# Patient Record
Sex: FEMALE | Race: WHITE | NOT HISPANIC OR LATINO | Employment: FULL TIME | URBAN - METROPOLITAN AREA
[De-identification: names, ages, dates, MRNs, and addresses within clinical notes are randomized per-mention and may not be internally consistent; named-entity substitution may affect disease eponyms.]

---

## 2023-06-30 LAB — URINE CULTURE: NO GROWTH

## 2023-08-25 PROBLEM — C44.320 SQUAMOUS CELL CARCINOMA OF SKIN OF UNSPECIFIED PARTS OF FACE: Status: ACTIVE | Noted: 2020-10-14

## 2023-08-25 PROBLEM — C68.9 UROTHELIAL CARCINOMA (MULTI): Status: ACTIVE | Noted: 2023-08-25

## 2023-08-25 PROBLEM — E78.2 MIXED HYPERLIPIDEMIA: Status: ACTIVE | Noted: 2023-08-25

## 2023-08-25 PROBLEM — E11.9 TYPE 2 DIABETES MELLITUS WITHOUT COMPLICATION, WITHOUT LONG-TERM CURRENT USE OF INSULIN (MULTI): Status: ACTIVE | Noted: 2023-08-25

## 2023-08-25 PROBLEM — I10 ESSENTIAL (PRIMARY) HYPERTENSION: Status: ACTIVE | Noted: 2023-08-25

## 2023-08-25 PROBLEM — R09.89 BRUIT OF LEFT CAROTID ARTERY: Status: ACTIVE | Noted: 2023-08-25

## 2023-08-25 PROBLEM — R01.1 HEART MURMUR: Status: ACTIVE | Noted: 2023-08-25

## 2023-08-25 PROBLEM — N95.0 POSTMENOPAUSAL BLEEDING: Status: ACTIVE | Noted: 2023-08-25

## 2023-08-25 PROBLEM — R63.1 POLYDIPSIA: Status: ACTIVE | Noted: 2023-08-25

## 2023-08-25 PROBLEM — E66.9 OBESITY WITH BODY MASS INDEX 30 OR GREATER: Status: ACTIVE | Noted: 2023-08-25

## 2023-08-25 PROBLEM — B35.1 ONYCHOMYCOSIS: Status: ACTIVE | Noted: 2023-08-25

## 2023-08-25 PROBLEM — E55.9 VITAMIN D DEFICIENCY: Status: ACTIVE | Noted: 2023-08-25

## 2023-08-25 PROBLEM — J44.9 CHRONIC OBSTRUCTIVE LUNG DISEASE (MULTI): Status: ACTIVE | Noted: 2023-08-25

## 2023-08-25 PROBLEM — F17.200 NICOTINE DEPENDENCE: Status: ACTIVE | Noted: 2023-08-25

## 2023-08-25 PROBLEM — N95.2 ATROPHY OF VAGINA: Status: ACTIVE | Noted: 2023-08-25

## 2023-08-25 PROBLEM — C67.9 MALIGNANT NEOPLASM OF URINARY BLADDER (MULTI): Status: ACTIVE | Noted: 2023-08-25

## 2023-08-25 RX ORDER — LEVALBUTEROL TARTRATE 45 UG/1
AEROSOL, METERED ORAL
COMMUNITY
Start: 2021-12-04 | End: 2024-05-02 | Stop reason: ALTCHOICE

## 2023-08-25 RX ORDER — IPRATROPIUM BROMIDE AND ALBUTEROL SULFATE 2.5; .5 MG/3ML; MG/3ML
3 SOLUTION RESPIRATORY (INHALATION)
COMMUNITY
Start: 2016-01-30 | End: 2024-05-02 | Stop reason: ALTCHOICE

## 2023-08-25 RX ORDER — DULAGLUTIDE 1.5 MG/.5ML
1.5 INJECTION, SOLUTION SUBCUTANEOUS
COMMUNITY
Start: 2023-05-17 | End: 2023-10-25 | Stop reason: DRUGHIGH

## 2023-08-25 RX ORDER — LISINOPRIL 5 MG/1
1 TABLET ORAL DAILY
COMMUNITY
End: 2024-05-02 | Stop reason: SDUPTHER

## 2023-08-25 RX ORDER — METFORMIN HYDROCHLORIDE 500 MG/1
1 TABLET, EXTENDED RELEASE ORAL 2 TIMES DAILY
COMMUNITY
End: 2023-10-25 | Stop reason: SDUPTHER

## 2023-08-25 RX ORDER — ALBUTEROL SULFATE 0.83 MG/ML
2.5 SOLUTION RESPIRATORY (INHALATION) 2 TIMES DAILY
COMMUNITY
End: 2024-01-30 | Stop reason: WASHOUT

## 2023-08-25 RX ORDER — FLUTICASONE PROPIONATE 50 MCG
1 SPRAY, SUSPENSION (ML) NASAL DAILY
COMMUNITY
Start: 2017-05-20 | End: 2023-10-25

## 2023-08-25 RX ORDER — ALBUTEROL SULFATE 90 UG/1
1 AEROSOL, METERED RESPIRATORY (INHALATION) EVERY 4 HOURS PRN
COMMUNITY
End: 2024-01-30 | Stop reason: WASHOUT

## 2023-08-25 RX ORDER — FLUTICASONE PROPIONATE AND SALMETEROL 500; 50 UG/1; UG/1
1 POWDER RESPIRATORY (INHALATION)
COMMUNITY
End: 2023-10-25

## 2023-08-25 RX ORDER — ROSUVASTATIN CALCIUM 5 MG/1
5 TABLET, COATED ORAL DAILY
COMMUNITY
End: 2024-05-02 | Stop reason: SDUPTHER

## 2023-08-29 PROBLEM — R31.0 GROSS HEMATURIA: Status: ACTIVE | Noted: 2023-08-29

## 2023-08-29 PROBLEM — N95.8 GENITOURINARY SYNDROME OF MENOPAUSE: Status: ACTIVE | Noted: 2023-08-29

## 2023-08-29 PROBLEM — M67.921 TENDINOPATHY OF RIGHT BICEPS TENDON: Status: ACTIVE | Noted: 2023-08-29

## 2023-08-29 PROBLEM — S46.011A TRAUMATIC COMPLETE TEAR OF RIGHT ROTATOR CUFF: Status: ACTIVE | Noted: 2023-08-29

## 2023-08-29 RX ORDER — SEMAGLUTIDE 1.34 MG/ML
INJECTION, SOLUTION SUBCUTANEOUS
COMMUNITY
End: 2023-10-25 | Stop reason: ALTCHOICE

## 2023-08-29 RX ORDER — CEPHALEXIN 250 MG/1
1 CAPSULE ORAL NIGHTLY
COMMUNITY
End: 2024-05-02 | Stop reason: ALTCHOICE

## 2023-08-29 RX ORDER — CLINDAMYCIN PHOSPHATE 10 UG/ML
LOTION TOPICAL
COMMUNITY

## 2023-08-29 RX ORDER — GUAIFENESIN 100 MG/5ML
5 SOLUTION ORAL 4 TIMES DAILY PRN
COMMUNITY
End: 2023-10-25

## 2023-08-29 RX ORDER — ESTRADIOL 0.1 MG/G
CREAM VAGINAL NIGHTLY
COMMUNITY
End: 2023-10-25 | Stop reason: ALTCHOICE

## 2023-09-22 LAB
ANION GAP IN SER/PLAS: 16 MMOL/L (ref 10–20)
CALCIUM (MG/DL) IN SER/PLAS: 10.1 MG/DL (ref 8.6–10.6)
CARBON DIOXIDE, TOTAL (MMOL/L) IN SER/PLAS: 27 MMOL/L (ref 21–32)
CHLORIDE (MMOL/L) IN SER/PLAS: 103 MMOL/L (ref 98–107)
CREATININE (MG/DL) IN SER/PLAS: 0.63 MG/DL (ref 0.5–1.05)
GFR FEMALE: >90 ML/MIN/1.73M2
GLUCOSE (MG/DL) IN SER/PLAS: 133 MG/DL (ref 74–99)
POTASSIUM (MMOL/L) IN SER/PLAS: 4.5 MMOL/L (ref 3.5–5.3)
SODIUM (MMOL/L) IN SER/PLAS: 141 MMOL/L (ref 136–145)
UREA NITROGEN (MG/DL) IN SER/PLAS: 10 MG/DL (ref 6–23)

## 2023-10-02 ENCOUNTER — DOCUMENTATION (OUTPATIENT)
Dept: UROLOGY | Facility: CLINIC | Age: 63
End: 2023-10-02
Payer: COMMERCIAL

## 2023-10-05 ENCOUNTER — PROCEDURE VISIT (OUTPATIENT)
Dept: UROLOGY | Facility: CLINIC | Age: 63
End: 2023-10-05
Payer: COMMERCIAL

## 2023-10-05 VITALS — SYSTOLIC BLOOD PRESSURE: 119 MMHG | HEART RATE: 94 BPM | TEMPERATURE: 97.8 F | DIASTOLIC BLOOD PRESSURE: 74 MMHG

## 2023-10-05 DIAGNOSIS — C67.9 MALIGNANT NEOPLASM OF URINARY BLADDER, UNSPECIFIED SITE (MULTI): Primary | ICD-10-CM

## 2023-10-05 LAB
POC APPEARANCE, URINE: CLEAR
POC BILIRUBIN, URINE: NEGATIVE
POC BLOOD, URINE: NEGATIVE
POC COLOR, URINE: YELLOW
POC GLUCOSE, URINE: NEGATIVE MG/DL
POC KETONES, URINE: NEGATIVE MG/DL
POC LEUKOCYTES, URINE: NEGATIVE
POC NITRITE,URINE: NEGATIVE
POC PH, URINE: 5 PH
POC PROTEIN, URINE: NEGATIVE MG/DL
POC SPECIFIC GRAVITY, URINE: 1.02
POC UROBILINOGEN, URINE: 0.2 EU/DL

## 2023-10-05 PROCEDURE — 99213 OFFICE O/P EST LOW 20 MIN: CPT | Performed by: STUDENT IN AN ORGANIZED HEALTH CARE EDUCATION/TRAINING PROGRAM

## 2023-10-05 PROCEDURE — 52000 CYSTOURETHROSCOPY: CPT | Performed by: STUDENT IN AN ORGANIZED HEALTH CARE EDUCATION/TRAINING PROGRAM

## 2023-10-05 PROCEDURE — 87086 URINE CULTURE/COLONY COUNT: CPT | Performed by: STUDENT IN AN ORGANIZED HEALTH CARE EDUCATION/TRAINING PROGRAM

## 2023-10-05 PROCEDURE — 81002 URINALYSIS NONAUTO W/O SCOPE: CPT | Performed by: STUDENT IN AN ORGANIZED HEALTH CARE EDUCATION/TRAINING PROGRAM

## 2023-10-05 NOTE — PROGRESS NOTES
HPI:  Proc (3/8/22): TURBT large, R URS, stent placement, retrograde pyelogram  Path: Papillary urothelial carcinoma, low grade with focal high grade cytologic features, focal area suspicious for lamina propria invasion (TURBT), non-invasive papillary urothelial carcinoma, low grade (left bladder wall biopsy), partially-denuded urothelial mucosa with inflammation and focal atypia that does not meet the threshold for carcinoma in situ (deep bladder biopsy)     Proc (4/5/22): TURBT medium, stent exchange  Path: Partially denuded urothelial mucosa with chronic inflammation and proliferative changes, favor reactive, changes consistent with previous procedure, muscularis propria negative for carcinoma (TURBT), smooth muscle and adipose tissue negative for carcinoma (deep margin tumor biopsy)     Proc (9/6/22): TURBT small  Path: focal high-grade and predominantly low-grade papillary urothelial carcinoma, no definitive lamina propria invasion, muscularis propria uninvolved by carcinoma     62 year old female with hx of DM on metformin referred by Dr. Mcdonald with gross hematuria since September 2021. Went to OB/GYN who performed an US and endometrial biopsy which was XANDER. Had a UTI treated. Continued to have intermittent hematuria. Had a cystoscopy with Dr. Mcdonald (2/23/22) which showed a large tumor on the R lateral wall, and a small tumor on the L trigone. CT Urogram (2/28/22) with two synchronous urinary bladder masses, larger mass involves/surrounds right UVJ without obstructing it, smaller mass near/involving portion of left UVJ without obstructing it, a few nonspecific <6 mm solid indeterminate bibasilar lung nodules. S/p TURBT large (3/8/22) with pathology showing papillary urothelial carcinoma, low grade with focal high grade cytologic features, focal area suspicious for lamina propria invasion. Also s/p TURBT medium (4/5/22) with pathology showing partially denuded urothelial mucosa with chronic inflammation and  proliferative changes consistent with previous procedure, muscularis propria, smooth muscle and adipose tissue negative for carcinoma. Completed BCG. S/p TURBT small (9/6/22) with pathology showing focal high-grade and predominantly low-grade papillary urothelial carcinoma, no definitive lamina propria invasion, muscularis propria uninvolved by carcinoma. CT Urogram (9/29/23) showed no enhancing mass or filling defect in the bilateral ureters or bladder to suggest residual/recurrent disease and no evidence of distant disease. CT chest (9/29/23) showed few indeterminate less than 6 mm pulmonary nodules, otherwise, no suspicious pulmonary nodule or thoracic adenopathy. Presents for cystoscopy. Doing well.      Smoker x 43 years. Has quit smoking tobacco in February 22'. Continues to smoke marijuana   SHx: Mohs, rotator cuff, 2 C-sections.   FHx: Grandmother with bladder cancer (age 90). Did not die of disease.     Cre: 0.83 (2/23/22)     CT Urogram (2/28/22): Two synchronous urinary bladder masses. The larger mass involves/surrounds the right UVJ without obstructing it. The smaller mass is near/involving a portion of the left UVJ without obstructing it. A few nonspecific <6 mm solid indeterminate bibasilar lung nodules.     CT Urogram (9/29/23): no enhancing mass or filling defect in the bilateral ureters or bladder to suggest residual/recurrent disease and no evidence of distant disease.    CT chest (9/29/23): few indeterminate less than 6 mm pulmonary nodules, otherwise, no suspicious pulmonary nodule or thoracic adenopathy.    Review of Systems:  All systems reviewed. Anything negative noted in the HPI.    Physical Exam:  Vitals signs reviewed.  Constitutional:      Appearance: Well-developed.  HENT:     Head: Normocephalic and atraumatic.  Neck:     Musculoskeletal: Normal range of motion.  Pulmonary:     Effort: Pulmonary effort is normal.  Musculoskeletal: Normal range of motion.  Skin:     General: Skin is warm  and dry.  Neurological:     Mental Status: Alert and oriented to person, place, and time.  Psychiatric:        Behavior: Behavior normal.        Thought Content: Thought content normal.        Judgment: Judgment normal.    Cystoscopy    Date/Time: 10/5/2023 8:15 AM    Performed by: Fazal Mendoza MD  Authorized by: Fazal Mendoza MD      Post-procedure:     Patient tolerance: Patient tolerated the procedure well with no immediate complications      Comments:      Cystoscopy showed erythema adjacent to old scar lesion, no mucosal lesions         Assessment/Plan   62 year old female with hx of DM on metformin referred by Dr. Mcdonald with gross hematuria since September 2021. Went to OB/GYN who performed an US and endometrial biopsy which was XANDER. Had a UTI treated. Continued to have intermittent hematuria. Had a cystoscopy with Dr. Mcdonald (2/23/22) which showed a large tumor on the R lateral wall, and a small tumor on the L trigone. CT Urogram (2/28/22) with two synchronous urinary bladder masses, larger mass involves/surrounds right UVJ without obstructing it, smaller mass near/involving portion of left UVJ without obstructing it, a few nonspecific <6 mm solid indeterminate bibasilar lung nodules. S/p TURBT large (3/8/22) with pathology showing papillary urothelial carcinoma, low grade with focal high grade cytologic features, focal area suspicious for lamina propria invasion. Also s/p TURBT medium (4/5/22) with pathology showing partially denuded urothelial mucosa with chronic inflammation and proliferative changes consistent with previous procedure, muscularis propria, smooth muscle and adipose tissue negative for carcinoma. Completed BCG. S/p TURBT small (9/6/22) with pathology showing focal high-grade and predominantly low-grade papillary urothelial carcinoma, no definitive lamina propria invasion, muscularis propria uninvolved by carcinoma. CT Urogram (9/29/23) showed no enhancing mass or filling defect in  the bilateral ureters or bladder to suggest residual/recurrent disease and no evidence of distant disease. CT chest (9/29/23) showed few indeterminate less than 6 mm pulmonary nodules, otherwise, no suspicious pulmonary nodule or thoracic adenopathy. Doing well. Cystoscopy showed erythema adjacent to old scar lesion, no mucosal lesions. Management options including risks, benefits and alternatives discussed at length and all questions answered. Patient prefers to proceed with follow-up in 6mos for cystoscopy.             By signing my name below, I, Chandler Díaz, attest that this documentation has been prepared under the direction and in the presence of Dr. Fazal Mendoza.  All medical record entries made by the Chandler were at my direction and personally dictated by me. I have reviewed the chart and agree that the record accurately reflects my personal performance of the history, physical exam, discussion and plan.

## 2023-10-07 LAB — BACTERIA UR CULT: NORMAL

## 2023-10-20 ENCOUNTER — LAB (OUTPATIENT)
Dept: LAB | Facility: LAB | Age: 63
End: 2023-10-20
Payer: COMMERCIAL

## 2023-10-20 DIAGNOSIS — E11.9 TYPE 2 DIABETES MELLITUS WITHOUT COMPLICATIONS (MULTI): ICD-10-CM

## 2023-10-20 DIAGNOSIS — E78.2 MIXED HYPERLIPIDEMIA: ICD-10-CM

## 2023-10-20 DIAGNOSIS — Z00.00 ENCOUNTER FOR GENERAL ADULT MEDICAL EXAMINATION WITHOUT ABNORMAL FINDINGS: Primary | ICD-10-CM

## 2023-10-20 LAB
ALBUMIN SERPL-MCNC: 4.7 G/DL (ref 3.5–5)
ALP BLD-CCNC: 80 U/L (ref 35–125)
ALT SERPL-CCNC: 43 U/L (ref 5–40)
ANION GAP SERPL CALC-SCNC: 15 MMOL/L
AST SERPL-CCNC: 52 U/L (ref 5–40)
BILIRUB SERPL-MCNC: 1 MG/DL (ref 0.1–1.2)
BUN SERPL-MCNC: 14 MG/DL (ref 8–25)
CALCIUM SERPL-MCNC: 10.1 MG/DL (ref 8.5–10.4)
CHLORIDE SERPL-SCNC: 100 MMOL/L (ref 97–107)
CHOLEST SERPL-MCNC: 141 MG/DL (ref 133–200)
CHOLEST/HDLC SERPL: 4.4 {RATIO}
CO2 SERPL-SCNC: 25 MMOL/L (ref 24–31)
CREAT SERPL-MCNC: 0.7 MG/DL (ref 0.4–1.6)
CREAT UR-MCNC: 196 MG/DL
ERYTHROCYTE [DISTWIDTH] IN BLOOD BY AUTOMATED COUNT: 12.4 % (ref 11.5–14.5)
GFR SERPL CREATININE-BSD FRML MDRD: >90 ML/MIN/1.73M*2
GLUCOSE SERPL-MCNC: 138 MG/DL (ref 65–99)
HCT VFR BLD AUTO: 47.6 % (ref 36–46)
HDLC SERPL-MCNC: 32 MG/DL
HGB BLD-MCNC: 15.6 G/DL (ref 12–16)
LDLC SERPL CALC-MCNC: 66 MG/DL (ref 65–130)
MCH RBC QN AUTO: 32.2 PG (ref 26–34)
MCHC RBC AUTO-ENTMCNC: 32.8 G/DL (ref 32–36)
MCV RBC AUTO: 98 FL (ref 80–100)
MICROALBUMIN UR-MCNC: 41 MG/L (ref 0–23)
MICROALBUMIN/CREAT UR: 20.9 UG/MG CREAT
NRBC BLD-RTO: 0 /100 WBCS (ref 0–0)
PLATELET # BLD AUTO: 320 X10*3/UL (ref 150–450)
PMV BLD AUTO: 9.8 FL (ref 7.5–11.5)
POTASSIUM SERPL-SCNC: 4.9 MMOL/L (ref 3.4–5.1)
PROT SERPL-MCNC: 7.6 G/DL (ref 5.9–7.9)
RBC # BLD AUTO: 4.85 X10*6/UL (ref 4–5.2)
SODIUM SERPL-SCNC: 140 MMOL/L (ref 133–145)
TRIGL SERPL-MCNC: 213 MG/DL (ref 40–150)
TSH SERPL DL<=0.05 MIU/L-ACNC: 1.81 MIU/L (ref 0.27–4.2)
WBC # BLD AUTO: 9.7 X10*3/UL (ref 4.4–11.3)

## 2023-10-20 PROCEDURE — 82570 ASSAY OF URINE CREATININE: CPT

## 2023-10-20 PROCEDURE — 36415 COLL VENOUS BLD VENIPUNCTURE: CPT

## 2023-10-20 PROCEDURE — 84443 ASSAY THYROID STIM HORMONE: CPT

## 2023-10-20 PROCEDURE — 82043 UR ALBUMIN QUANTITATIVE: CPT

## 2023-10-20 PROCEDURE — 85027 COMPLETE CBC AUTOMATED: CPT

## 2023-10-20 PROCEDURE — 80061 LIPID PANEL: CPT

## 2023-10-20 PROCEDURE — 80053 COMPREHEN METABOLIC PANEL: CPT

## 2023-10-24 ASSESSMENT — ENCOUNTER SYMPTOMS
SWEATS: 0
DIZZINESS: 0
TREMORS: 0
HEADACHES: 1
HUNGER: 0
WEIGHT LOSS: 0
NERVOUS/ANXIOUS: 1
POLYDIPSIA: 0
CONFUSION: 0
WEAKNESS: 0
FATIGUE: 0
BLACKOUTS: 0
BLURRED VISION: 0
VISUAL CHANGE: 0
SEIZURES: 0
POLYPHAGIA: 0
SPEECH DIFFICULTY: 0

## 2023-10-25 ENCOUNTER — OFFICE VISIT (OUTPATIENT)
Dept: PRIMARY CARE | Facility: CLINIC | Age: 63
End: 2023-10-25
Payer: COMMERCIAL

## 2023-10-25 VITALS
SYSTOLIC BLOOD PRESSURE: 114 MMHG | DIASTOLIC BLOOD PRESSURE: 72 MMHG | RESPIRATION RATE: 16 BRPM | OXYGEN SATURATION: 96 % | HEIGHT: 63 IN | HEART RATE: 74 BPM | WEIGHT: 179.8 LBS | BODY MASS INDEX: 31.86 KG/M2

## 2023-10-25 DIAGNOSIS — Z76.89 ESTABLISHING CARE WITH NEW DOCTOR, ENCOUNTER FOR: ICD-10-CM

## 2023-10-25 DIAGNOSIS — C67.9 MALIGNANT NEOPLASM OF URINARY BLADDER, UNSPECIFIED SITE (MULTI): ICD-10-CM

## 2023-10-25 DIAGNOSIS — E78.2 MIXED HYPERLIPIDEMIA: ICD-10-CM

## 2023-10-25 DIAGNOSIS — E55.9 VITAMIN D DEFICIENCY: ICD-10-CM

## 2023-10-25 DIAGNOSIS — I10 ESSENTIAL (PRIMARY) HYPERTENSION: ICD-10-CM

## 2023-10-25 DIAGNOSIS — Z23 ENCOUNTER FOR IMMUNIZATION: ICD-10-CM

## 2023-10-25 DIAGNOSIS — F17.218 CIGARETTE NICOTINE DEPENDENCE WITH OTHER NICOTINE-INDUCED DISORDER: ICD-10-CM

## 2023-10-25 DIAGNOSIS — E11.9 TYPE 2 DIABETES MELLITUS WITHOUT COMPLICATION, WITHOUT LONG-TERM CURRENT USE OF INSULIN (MULTI): Primary | ICD-10-CM

## 2023-10-25 LAB — POC HEMOGLOBIN A1C: 7 % (ref 4.2–6.5)

## 2023-10-25 PROCEDURE — 3048F LDL-C <100 MG/DL: CPT

## 2023-10-25 PROCEDURE — 1036F TOBACCO NON-USER: CPT

## 2023-10-25 PROCEDURE — 90471 IMMUNIZATION ADMIN: CPT

## 2023-10-25 PROCEDURE — 3060F POS MICROALBUMINURIA REV: CPT

## 2023-10-25 PROCEDURE — 3078F DIAST BP <80 MM HG: CPT

## 2023-10-25 PROCEDURE — 90686 IIV4 VACC NO PRSV 0.5 ML IM: CPT

## 2023-10-25 PROCEDURE — 4010F ACE/ARB THERAPY RXD/TAKEN: CPT

## 2023-10-25 PROCEDURE — 83036 HEMOGLOBIN GLYCOSYLATED A1C: CPT

## 2023-10-25 PROCEDURE — 99214 OFFICE O/P EST MOD 30 MIN: CPT

## 2023-10-25 PROCEDURE — 3074F SYST BP LT 130 MM HG: CPT

## 2023-10-25 RX ORDER — METFORMIN HYDROCHLORIDE 500 MG/1
500 TABLET, EXTENDED RELEASE ORAL 2 TIMES DAILY
Qty: 90 TABLET | Refills: 1 | Status: SHIPPED | OUTPATIENT
Start: 2023-10-25 | End: 2024-05-02 | Stop reason: SDUPTHER

## 2023-10-25 ASSESSMENT — ENCOUNTER SYMPTOMS
VISUAL CHANGE: 0
FATIGUE: 0
BLACKOUTS: 0
NERVOUS/ANXIOUS: 1
HUNGER: 0
TREMORS: 0
BLURRED VISION: 0
LOSS OF SENSATION IN FEET: 0
OCCASIONAL FEELINGS OF UNSTEADINESS: 0
SWEATS: 0
SPEECH DIFFICULTY: 0
SEIZURES: 0
POLYPHAGIA: 0
DEPRESSION: 0
WEIGHT LOSS: 0
CONFUSION: 0
POLYDIPSIA: 0
DIZZINESS: 0
HEADACHES: 1
WEAKNESS: 0

## 2023-10-25 ASSESSMENT — PATIENT HEALTH QUESTIONNAIRE - PHQ9
1. LITTLE INTEREST OR PLEASURE IN DOING THINGS: NOT AT ALL
SUM OF ALL RESPONSES TO PHQ9 QUESTIONS 1 AND 2: 0
2. FEELING DOWN, DEPRESSED OR HOPELESS: NOT AT ALL

## 2023-10-25 ASSESSMENT — PAIN SCALES - GENERAL: PAINLEVEL: 0-NO PAIN

## 2023-10-25 NOTE — PATIENT INSTRUCTIONS
Decrease intake of saturated fats, fast food, sweets.  Increase intake of fresh fruit fresh vegetables and lean meats.  Increase healthy fats seeds, nuts, olive oil instead of butter.  walk 150 minutes/week for heart health.  Decrease intake of processed foods, fast foods, lunch meat, canned soups, canned veggies.  Increase intake of fresh fruits, veggies, and lean meats. Monitor blood pressure at home, keep a log and bring this with you to your next appointment. Call the office if your blood pressure runs 150/90 alanna higher consistently.  Continue to monitor blood sugars.  Try to decrease snacking in the evening.  Follow-up in 3 months call if you should have any concerns or needs between now and then

## 2023-10-25 NOTE — PROGRESS NOTES
Subjective   Patient ID: Clara Rashid is a 62 y.o. female who presents for Follow-up.  Former [facility] patient.  She denies any issue with chest pain, chest pressure, shortness of breath, constipation, diarrhea, blood in stool, urinary urgency, frequency, blood in urine, muscle weakness in arms or legs, numbness or tingling in fingers or toes.  She denies any urgent care, ER, hospitalization, new diagnoses, surgery since she was here last.  She did fall about a month ago while on vacation in [place] scraping both knees and elbows she denies hitting her head or losing consciousness she just tripped on a hike.  She notes that her fasting morning blood sugars are always elevated due to eating late at night when she finally sits down after 9:30 PM    Diabetes  She has type 2 diabetes mellitus. No MedicAlert identification noted. The initial diagnosis of diabetes was made 5 years ago. Hypoglycemia symptoms include headaches, nervousness/anxiousness and sleepiness. Pertinent negatives for hypoglycemia include no confusion, dizziness, hunger, mood changes, pallor, seizures, speech difficulty, sweats or tremors. Pertinent negatives for diabetes include no blurred vision, no chest pain, no fatigue, no foot paresthesias, no foot ulcerations, no polydipsia, no polyphagia, no polyuria, no visual change, no weakness and no weight loss. Pertinent negatives for hypoglycemia complications include no blackouts, no hospitalization, no nocturnal hypoglycemia, no required assistance and no required glucagon injection. Symptoms are stable. Pertinent negatives for diabetic complications include no CVA, heart disease, impotence, nephropathy, peripheral neuropathy, PVD or retinopathy. Risk factors for coronary artery disease include dyslipidemia, family history, hypertension, obesity and stress. Current diabetic treatment includes oral agent (dual therapy). She is compliant with treatment most of the time. Rotation sites for injection include  the abdominal wall. Her weight is fluctuating minimally. She is following a low salt diet. When asked about meal planning, she reported none. She has not had a previous visit with a dietitian. She participates in exercise intermittently. She monitors blood glucose at home 1-2 x per week. She monitors urine at home <1 x per month. Blood glucose monitoring compliance is fair. Her home blood glucose trend is fluctuating minimally. Her breakfast blood glucose is taken after 10 am. Her breakfast blood glucose range is generally 130-140 mg/dl. Her overall blood glucose range is 130-140 mg/dl. She does not see a podiatrist.Eye exam is current.        Review of Systems   Constitutional:  Negative for fatigue and weight loss.   Eyes:  Negative for blurred vision.   Cardiovascular:  Negative for chest pain.   Endocrine: Negative for polydipsia, polyphagia and polyuria.   Genitourinary:  Negative for impotence.   Skin:  Negative for pallor.   Neurological:  Positive for headaches. Negative for dizziness, tremors, seizures, speech difficulty and weakness.   Psychiatric/Behavioral:  Negative for confusion. The patient is nervous/anxious.      Review of Systems negative except as noted in HPI and Chief complaint.     Current Outpatient Medications:     albuterol (Proventil HFA) 90 mcg/actuation inhaler, Inhale 1 puff every 4 hours if needed., Disp: , Rfl:     albuterol 2.5 mg /3 mL (0.083 %) nebulizer solution, Take 3 mL (2.5 mg) by nebulization 2 times a day., Disp: , Rfl:     cephalexin (Keflex) 250 mg capsule, Take 1 capsule (250 mg) by mouth once daily at bedtime., Disp: , Rfl:     clindamycin (Cleocin T) 1 % lotion, Apply topically. Apply to affected areas 1 to 2 times daily as needed, Disp: , Rfl:     dulaglutide (Trulicity) 1.5 mg/0.5 mL pen injector injection, Inject 1.5 mg under the skin 1 (one) time per week., Disp: , Rfl:     estradiol (Estrace) 0.01 % (0.1 mg/gram) vaginal cream, Insert into the vagina once daily at  bedtime. Apply a pea sized amount into vagina 3 times per week, Disp: , Rfl:     fluticasone (Flonase) 50 mcg/actuation nasal spray, Administer 1 spray into each nostril once daily., Disp: , Rfl:     fluticasone propion-salmeteroL (Advair Diskus) 500-50 mcg/dose diskus inhaler, Inhale 1 puff 2 times a day., Disp: , Rfl:     guaiFENesin (Robitussin) 100 mg/5 mL syrup, Take 5 mL by mouth 4 times a day as needed for cough., Disp: , Rfl:     ipratropium-albuteroL (Duo-Neb) 0.5-2.5 mg/3 mL nebulizer solution, Take 3 mL by nebulization 4 times a day., Disp: , Rfl:     levalbuterol (Xopenex) 45 mcg/actuation inhaler, Inhale., Disp: , Rfl:     lisinopril 5 mg tablet, Take 1 tablet (5 mg) by mouth once daily., Disp: , Rfl:     metFORMIN XR (Glucophage-XR) 500 mg 24 hr tablet, Take 1 tablet (500 mg) by mouth 2 times a day., Disp: , Rfl:     rosuvastatin (Crestor) 5 mg tablet, Take 1 tablet (5 mg) by mouth once daily., Disp: , Rfl:     semaglutide (Ozempic) 0.25 mg or 0.5 mg(2 mg/1.5 mL) pen injector, Inject under the skin., Disp: , Rfl:      Objective   There were no vitals taken for this visit.    Physical Exam  Constitutional:       Appearance: Normal appearance.   Cardiovascular:      Rate and Rhythm: Normal rate and regular rhythm.      Heart sounds: Normal heart sounds.   Pulmonary:      Effort: Pulmonary effort is normal.      Breath sounds: Normal breath sounds.   Musculoskeletal:      Cervical back: Normal range of motion.   Neurological:      General: No focal deficit present.      Mental Status: She is alert and oriented to person, place, and time. Mental status is at baseline.   Psychiatric:         Mood and Affect: Mood normal.         Behavior: Behavior normal.         Thought Content: Thought content normal.         Judgment: Judgment normal.         Assessment/Plan   Diagnoses and all orders for this visit:  Type 2 diabetes mellitus without complication, without long-term current use of insulin (CMS/[facility])  -      POCT glycosylated hemoglobin (Hb A1C) manually resulted  -     metFORMIN  mg 24 hr tablet; Take 1 tablet (500 mg) by mouth 2 times a day.  Mixed hyperlipidemia  Essential (primary) hypertension  Vitamin D deficiency  Malignant neoplasm of urinary bladder, unspecified site (CMS/HCC)  Cigarette nicotine dependence with other nicotine-induced disorder  Establishing care with new doctor, encounter for  Encounter for immunization  -     Flu vaccine (IIV4) age 6 months and greater, preservative free    Gladys Fierro, CNP

## 2023-12-04 ENCOUNTER — HOSPITAL ENCOUNTER (OUTPATIENT)
Dept: RADIOLOGY | Facility: HOSPITAL | Age: 63
Discharge: HOME | End: 2023-12-04
Payer: COMMERCIAL

## 2023-12-04 VITALS — WEIGHT: 178 LBS | BODY MASS INDEX: 31.54 KG/M2 | HEIGHT: 63 IN

## 2023-12-04 DIAGNOSIS — Z12.31 ENCOUNTER FOR SCREENING MAMMOGRAM FOR MALIGNANT NEOPLASM OF BREAST: ICD-10-CM

## 2023-12-04 PROCEDURE — 77067 SCR MAMMO BI INCL CAD: CPT

## 2023-12-04 NOTE — LETTER
December 6, 2023     Clara Rashid  [address] [facility] [address]      Dear Ms. Rashid:    Below are the results from your recent mammogram:    Resulted Orders   BI mammo bilateral screening tomosynthesis    Narrative    Interpreted By:  Serena Moore,   STUDY:  BI MAMMO BILATERAL SCREENING TOMOSYNTHESIS;  12/4/2023 8:05 am      ACCESSION NUMBER(S):  [id number]      ORDERING CLINICIAN:  HALEIGH FIERRO      INDICATION:  Screening.      Based on the Tyrer-Cuzick model for breast cancer risk assessment,  the patient's lifetime risk of breast cancer is 9.6%.      COMPARISON:  12/02/2022 10/26/2021, 11/26/2016      TECHNIQUE:  Digital routine screening MLO and CC projections were obtained  bilaterally. Tomosynthesis was also utilized. The images were  processed utilizing computer aided detection system.      FINDINGS:  Density:  The breast tissue is almost entirely fatty.      No suspicious masses or tumoral calcifications are identified. There  are benign calcifications seen bilaterally. No secondary signs of  malignancy are observed.      ACR breast density category A        Impression    No mammographic evidence of malignancy.      BI-RADS CATEGORY:      BI-RADS Category:  2 Benign.  Recommendation:  Routine Screening Mammogram in 1 Year.  Recommended Date:  1 Year.  Laterality:  Bilateral.      For any future breast imaging appointments, please call [phone]).          MACRO:  None      Signed by: Serena Moore 12/4/2023 8:26 AM  Dictation workstation:   [id number]     The test results show that your mammogram is normal - no radiographic signs of malignancy. Repeat in 1 year.    If you have any questions or concerns, please don't hesitate to call.         Sincerely,        Haleigh Fierro, CNP

## 2023-12-07 ENCOUNTER — APPOINTMENT (OUTPATIENT)
Dept: UROLOGY | Facility: CLINIC | Age: 63
End: 2023-12-07
Payer: COMMERCIAL

## 2024-01-28 ASSESSMENT — ENCOUNTER SYMPTOMS
POLYDIPSIA: 0
BLURRED VISION: 0
FATIGUE: 1
BLACKOUTS: 0
VISUAL CHANGE: 0
CONFUSION: 0
NERVOUS/ANXIOUS: 0
WEIGHT LOSS: 0
DIZZINESS: 0
WEAKNESS: 0
POLYPHAGIA: 0
HUNGER: 0
SPEECH DIFFICULTY: 0
TREMORS: 0
SWEATS: 0
HEADACHES: 0
SEIZURES: 0

## 2024-01-30 ENCOUNTER — OFFICE VISIT (OUTPATIENT)
Dept: PRIMARY CARE | Facility: CLINIC | Age: 64
End: 2024-01-30
Payer: COMMERCIAL

## 2024-01-30 VITALS
BODY MASS INDEX: 30.55 KG/M2 | DIASTOLIC BLOOD PRESSURE: 67 MMHG | RESPIRATION RATE: 16 BRPM | SYSTOLIC BLOOD PRESSURE: 110 MMHG | WEIGHT: 172.4 LBS | HEIGHT: 63 IN | OXYGEN SATURATION: 97 % | HEART RATE: 93 BPM

## 2024-01-30 DIAGNOSIS — E11.9 TYPE 2 DIABETES MELLITUS WITHOUT COMPLICATION, WITHOUT LONG-TERM CURRENT USE OF INSULIN (MULTI): ICD-10-CM

## 2024-01-30 DIAGNOSIS — R06.2 WHEEZING: ICD-10-CM

## 2024-01-30 DIAGNOSIS — R05.1 ACUTE COUGH: ICD-10-CM

## 2024-01-30 DIAGNOSIS — I10 ESSENTIAL (PRIMARY) HYPERTENSION: ICD-10-CM

## 2024-01-30 DIAGNOSIS — C67.9 MALIGNANT NEOPLASM OF URINARY BLADDER, UNSPECIFIED SITE (MULTI): ICD-10-CM

## 2024-01-30 DIAGNOSIS — E78.2 MIXED HYPERLIPIDEMIA: Primary | ICD-10-CM

## 2024-01-30 LAB — POC HEMOGLOBIN A1C: 6 % (ref 4.2–6.5)

## 2024-01-30 PROCEDURE — 1036F TOBACCO NON-USER: CPT

## 2024-01-30 PROCEDURE — 99214 OFFICE O/P EST MOD 30 MIN: CPT

## 2024-01-30 PROCEDURE — 4010F ACE/ARB THERAPY RXD/TAKEN: CPT

## 2024-01-30 PROCEDURE — 3074F SYST BP LT 130 MM HG: CPT

## 2024-01-30 PROCEDURE — 3078F DIAST BP <80 MM HG: CPT

## 2024-01-30 RX ORDER — DOXYCYCLINE 100 MG/1
100 CAPSULE ORAL 2 TIMES DAILY
Qty: 10 CAPSULE | Refills: 0 | Status: SHIPPED | OUTPATIENT
Start: 2024-01-30 | End: 2024-02-04

## 2024-01-30 ASSESSMENT — ENCOUNTER SYMPTOMS
SEIZURES: 0
DIZZINESS: 0
VISUAL CHANGE: 0
HEADACHES: 0
SPEECH DIFFICULTY: 0
LOSS OF SENSATION IN FEET: 0
CONFUSION: 0
BLACKOUTS: 0
WEIGHT LOSS: 0
NERVOUS/ANXIOUS: 0
WEAKNESS: 0
POLYPHAGIA: 0
BLURRED VISION: 0
TREMORS: 0
FATIGUE: 1
DEPRESSION: 0
POLYDIPSIA: 0
HUNGER: 0
OCCASIONAL FEELINGS OF UNSTEADINESS: 0
SWEATS: 0

## 2024-01-30 ASSESSMENT — COLUMBIA-SUICIDE SEVERITY RATING SCALE - C-SSRS
6. HAVE YOU EVER DONE ANYTHING, STARTED TO DO ANYTHING, OR PREPARED TO DO ANYTHING TO END YOUR LIFE?: NO
1. IN THE PAST MONTH, HAVE YOU WISHED YOU WERE DEAD OR WISHED YOU COULD GO TO SLEEP AND NOT WAKE UP?: NO
2. HAVE YOU ACTUALLY HAD ANY THOUGHTS OF KILLING YOURSELF?: NO

## 2024-01-30 ASSESSMENT — PAIN SCALES - GENERAL: PAINLEVEL: 0-NO PAIN

## 2024-01-30 ASSESSMENT — PATIENT HEALTH QUESTIONNAIRE - PHQ9
1. LITTLE INTEREST OR PLEASURE IN DOING THINGS: NOT AT ALL
2. FEELING DOWN, DEPRESSED OR HOPELESS: NOT AT ALL
SUM OF ALL RESPONSES TO PHQ9 QUESTIONS 1 AND 2: 0

## 2024-01-30 NOTE — PATIENT INSTRUCTIONS
Continue current medication.  We discussed decreasing metformin daily intake at your next visit if we have continuous improvement of your A1c as discussed in the office.  Continue work on diet - recommend lots of fruits and vegetables, lean protein like chicken, turkey, fish, beans and Greek yogurt. Try to choose healthier carbohydrate options like oatmeal, wheat bread and pasta, sweet potatoes. Limit sugary treats.  Check a fasting sugar first thing in the AM twice daily and keep a log of the results to bring to your next office visit.  Please contact office if your sugars are consistently >140.  Reevaluate in 3 months.   Decrease intake of saturated fats, fast food, sweets.  Increase intake of fresh fruit fresh vegetables and lean meats.  Increase healthy fats seeds, nuts, olive oil instead of butter.  walk 150 minutes/week for heart health.  Decrease intake of processed foods, fast foods, lunch meat, canned soups, canned veggies.  Increase intake of fresh fruits, veggies, and lean meats. Monitor blood pressure at home, keep a log and bring this with you to your next appointment. Call the office if your blood pressure runs 150/90 or higher consistently.

## 2024-01-30 NOTE — PROGRESS NOTES
Subjective   Patient ID: Clara Rashid is a 63 y.o. female who presents for Follow-up.  Denies any falls, urgent care, ER, hospitalization, new diagnoses or surgeries since she was here last.  Denies any issues with chest pain, chest pressure, constipation, diarrhea, blood in stool, urinary urgency, frequency, blood in urine, muscle weakness in arms and legs numbness and tingling in fingers or toes.  She does admit to having a sinus infection around the end of November since then she has intermittent runny/nasal congestion.  She has tried Mucinex intermittently but does not like to take medication so has not been consistent with it.  She does continue to follow-up with urology every 6 months at this time.  Diabetes  She has type 2 diabetes mellitus. No MedicAlert identification noted. Pertinent negatives for hypoglycemia include no confusion, dizziness, headaches, hunger, mood changes, nervousness/anxiousness, pallor, seizures, sleepiness, speech difficulty, sweats or tremors. Associated symptoms include fatigue. Pertinent negatives for diabetes include no blurred vision, no chest pain, no foot paresthesias, no foot ulcerations, no polydipsia, no polyphagia, no polyuria, no visual change, no weakness and no weight loss. Pertinent negatives for hypoglycemia complications include no blackouts, no hospitalization, no nocturnal hypoglycemia, no required assistance and no required glucagon injection. Symptoms are improving. Pertinent negatives for diabetic complications include no CVA, heart disease, impotence, nephropathy, peripheral neuropathy, PVD or retinopathy. Risk factors for coronary artery disease include dyslipidemia, family history, hypertension, obesity and stress. Current diabetic treatment includes oral agent (dual therapy). She is compliant with treatment all of the time. Her weight is decreasing steadily. She is following a generally healthy diet. Meal planning includes carbohydrate counting. She has  "not had a previous visit with a dietitian. She participates in exercise intermittently. She monitors blood glucose at home 1-2 x per week. Blood glucose monitoring compliance is adequate. Her home blood glucose trend is decreasing steadily. Her breakfast blood glucose is taken between 5-6 am. Her breakfast blood glucose range is generally 110-130 mg/dl. She does not see a podiatrist.Eye exam is current.        Review of Systems   Constitutional:  Positive for fatigue. Negative for weight loss.   Eyes:  Negative for blurred vision.   Cardiovascular:  Negative for chest pain.   Endocrine: Negative for polydipsia, polyphagia and polyuria.   Genitourinary:  Negative for impotence.   Skin:  Negative for pallor.   Neurological:  Negative for dizziness, tremors, seizures, speech difficulty, weakness and headaches.   Psychiatric/Behavioral:  Negative for confusion. The patient is not nervous/anxious.      Review of Systems negative except as noted in HPI and Chief complaint.    Current Outpatient Medications:     cephalexin (Keflex) 250 mg capsule, Take 1 capsule (250 mg) by mouth once daily at bedtime., Disp: , Rfl:     clindamycin (Cleocin T) 1 % lotion, Apply topically. Apply to affected areas 1 to 2 times daily as needed, Disp: , Rfl:     ipratropium-albuteroL (Duo-Neb) 0.5-2.5 mg/3 mL nebulizer solution, Take 3 mL by nebulization 4 times a day., Disp: , Rfl:     levalbuterol (Xopenex) 45 mcg/actuation inhaler, Inhale., Disp: , Rfl:     lisinopril 5 mg tablet, Take 1 tablet (5 mg) by mouth once daily., Disp: , Rfl:     metFORMIN  mg 24 hr tablet, Take 1 tablet (500 mg) by mouth 2 times a day., Disp: 90 tablet, Rfl: 1    rosuvastatin (Crestor) 5 mg tablet, Take 1 tablet (5 mg) by mouth once daily., Disp: , Rfl:     Objective   /67 (BP Location: Left arm, Patient Position: Sitting, BP Cuff Size: Adult)   Pulse 93   Resp 16   Ht 1.6 m (5' 3\")   Wt 78.2 kg (172 lb 6.4 oz)   SpO2 97%   BMI 30.54 kg/m² "     Physical Exam  Vitals reviewed.   Constitutional:       Appearance: Normal appearance.   Cardiovascular:      Rate and Rhythm: Normal rate and regular rhythm.   Pulmonary:      Effort: Pulmonary effort is normal.   Musculoskeletal:         General: Normal range of motion.   Skin:     General: Skin is warm.   Neurological:      General: No focal deficit present.      Mental Status: She is alert and oriented to person, place, and time. Mental status is at baseline.   Psychiatric:         Mood and Affect: Mood normal.         Behavior: Behavior normal.         Thought Content: Thought content normal.         Judgment: Judgment normal.     Assessment/Plan   Diagnoses and all orders for this visit:  Mixed hyperlipidemia  Type 2 diabetes mellitus without complication, without long-term current use of insulin (CMS/[facility])  -     POCT glycosylated hemoglobin (Hb A1C) manually resulted  Essential (primary) hypertension  Malignant neoplasm of urinary bladder, unspecified site (CMS/[facility])  Wheezing  -     doxycycline (Vibramycin) 100 mg capsule; Take 1 capsule (100 mg) by mouth 2 times a day for 5 days. Take with at least 8 ounces (large glass) of water, do not lie down for 30 minutes after  Acute cough    Continue current medication.  We discussed decreasing metformin daily intake at your next visit if we have continuous improvement of your A1c as discussed in the office.  Continue work on diet - recommend lots of fruits and vegetables, lean protein like chicken, turkey, fish, beans and Greek yogurt. Try to choose healthier carbohydrate options like oatmeal, wheat bread and pasta, sweet potatoes. Limit sugary treats.  Check a fasting sugar first thing in the AM twice daily and keep a log of the results to bring to your next office visit.  Please contact office if your sugars are consistently >140.  Reevaluate in 3 months.   Decrease intake of saturated fats, fast food, sweets.  Increase intake of fresh fruit fresh vegetables  and lean meats.  Increase healthy fats seeds, nuts, olive oil instead of butter.  walk 150 minutes/week for heart health.  Decrease intake of processed foods, fast foods, lunch meat, canned soups, canned veggies.  Increase intake of fresh fruits, veggies, and lean meats. Monitor blood pressure at home, keep a log and bring this with you to your next appointment. Call the office if your blood pressure runs 150/90 or higher consistently.  *This note was dictated using DRAGON speech recognition software and was corrected for spelling or grammatical errors, but despite proofreading several typographical errors might be present that might affect the meaning of the content.*    Gladys Fierro, CNP

## 2024-02-20 ENCOUNTER — OFFICE VISIT (OUTPATIENT)
Dept: DERMATOLOGY | Facility: CLINIC | Age: 64
End: 2024-02-20
Payer: COMMERCIAL

## 2024-02-20 VITALS — HEART RATE: 84 BPM | SYSTOLIC BLOOD PRESSURE: 130 MMHG | DIASTOLIC BLOOD PRESSURE: 74 MMHG

## 2024-02-20 DIAGNOSIS — C44.722 SQUAMOUS CELL CARCINOMA OF SKIN OF RIGHT LOWER LIMB, INCLUDING HIP: ICD-10-CM

## 2024-02-20 PROCEDURE — 3075F SYST BP GE 130 - 139MM HG: CPT | Performed by: DERMATOLOGY

## 2024-02-20 PROCEDURE — 3078F DIAST BP <80 MM HG: CPT | Performed by: DERMATOLOGY

## 2024-02-20 PROCEDURE — 4010F ACE/ARB THERAPY RXD/TAKEN: CPT | Performed by: DERMATOLOGY

## 2024-02-20 PROCEDURE — 99204 OFFICE O/P NEW MOD 45 MIN: CPT | Performed by: DERMATOLOGY

## 2024-02-20 PROCEDURE — 17313 MOHS 1 STAGE T/A/L: CPT | Performed by: DERMATOLOGY

## 2024-02-20 PROCEDURE — 1036F TOBACCO NON-USER: CPT | Performed by: DERMATOLOGY

## 2024-02-20 NOTE — PROGRESS NOTES
Office Visit Note  Date: 2/20/2024  Surgeon:  Abbi Wiggins MD  Office Location:  [address] [facility]  [address] [facility] [address]  [facility] [address]  Dept: [phone]  Dept Fax: [phone]  Referring Provider: KINDRA Trevino   Clara Rashid is a 63 y.o. female who presents for the following: MOHS Surgery (RIGHT DISTAL PRETIBIAL REGION- SCC)    According to the patient, the lesion has been present for approximately 6 months at the time of diagnosis.  The lesion was not healing after her fall.  The lesion has not been treated previously.    The patient does not have a pacemaker / defibrillator.  The patient does not have a heart valve / joint replacement.    The patient is not on blood thinners.  The patient does not have a history of hepatitis B or C.  The patient does not have a history of HIV.  The patient does not have a history of immunosuppression (e.g. organ transplantation, malignancy, medications)    Review of Systems:  No other skin or systemic complaints other than what is documented elsewhere in the note.    MEDICAL HISTORY: clinically relevant history including significant past medical history, medications and allergies was reviewed and documented in Epic.    Objective   Well appearing patient in no apparent distress; mood and affect are within normal limits.  Vital signs: See record.  Noted on the RIGHT DISTAL PRETIBIAL REGION  Is a 1.3 x 1.6 cm scar      The patient confirmed the identified site.    Discussion:  The nature of the diagnosis was explained. The lesion is a skin cancer.  It has a risk of local growth and distant spread. The condition is associated with sun exposure.  Warning signs of non-melanoma skin cancer discussed. Patient was instructed to perform monthly self skin examination.  We recommended that the patient have regular full skin exams given an increased risk of subsequent skin cancers. The patient was instructed to use sun  protective behaviors including use of broad spectrum sunscreens and sun protective clothing to reduce risk of skin cancers.      Risks, benefits, side effects of Mohs surgery were discussed with patient and the patient voiced understanding.  It was explained that even though the cure rate of Mohs is very high it is not 100%. Risks of surgery including but not limited to bleeding, infection, numbness, nerve damage, and scar were reviewed.  Discussion included wound care requirements, activity restrictions, likely scar outcome and time to heal.     After Mohs surgery, the defect may need to be repaired surgically and the scar may be longer than the original lesion.  Reconstruction options, risks, and benefits were reviewed including second intention healing, linear repair (4-1 ratio was explained), local flaps, skin grafts, cartilage grafts and interpolation flaps (the need for multiple surgeries was explained). Possible outcomes were reviewed including likely scar appearance, failure of flap survival, infection, bleeding and the need for revision surgery.     The pathology was reviewed, the photograph was reviewed, and the referring physician's note was reviewed.    Patient elected for Mohs surgery.

## 2024-02-20 NOTE — PROGRESS NOTES
Mohs Surgery Operative Note    Date of Surgery:  2/20/2024  Surgeon:  Abbi Wiggins MD  Office Location:  [address] [facility]  [address] [facility] [address]  [facility] [address]  Dept: [phone]  Dept Fax: [phone]  Referring Provider: KINDRA Trevino    Assessment/Plan   Pre-procedure:   Obtained informed consent: written from patient  The surgical site was identified and confirmed with the patient.     Intra-operative:   Audible time out called at 8:41 AM 02/20/24  by: Gabriella Oneal RN   Verified patient name, birthdate, site, specimen bottle label & requisition.    The planned procedure(s) was again reviewed with the patient. The risks of bleeding, infection, nerve damage and scarring were reviewed. Written authorization was obtained. The patient identity, surgical site, and planned procedure(s) were verified. The provider acted as both surgeon and pathologist.     Squamous cell carcinoma of skin of right lower limb, including hip  RIGHT DISTAL PRETIBIAL REGION  Mohs surgery  Consent obtained: written    Universal Protocol:  Procedure explained and questions answered to patient or proxy's satisfaction: Yes    Test results available and properly labeled: Yes    Pathology report reviewed: Yes    External notes reviewed: Yes    Photo or diagram used for site identification: Yes    Site/side marked: Yes    Slide independently reviewed by Mohs surgeon: Yes    Immediately prior to procedure a time out was called: Yes    Patient identity confirmed: verbally with patient  Preparation: Patient was prepped and draped in usual sterile fashion      Anticoagulation:  Is the patient taking prescription anticoagulant and/or aspirin prescribed/recommended by a physician? No    Was the anticoagulation regimen changed prior to Mohs? No      Anesthesia:  Anesthesia method: local infiltration  Local anesthetic: lidocaine 0.5% WITH epi    Procedure Details:  Biopsy accession number:  [id number]  Date of biopsy: 1/11/2024  Pre-Op diagnosis: squamous cell carcinoma  SCC subtype: KA type  Surgery side: right  Surgical site (from skin exam): RIGHT DISTAL PRETIBIAL REGION  Pre-operative length (cm): 1.3  Pre-operative width (cm): 1.6  Indications for Mohs surgery: anatomic location where tissue conservation is critical  Previously treated? No      Micrographic Surgery Details:  Post-operative length (cm): 1.4  Post-operative width (cm): 1.7  Number of Mohs stages: 1    Stage 1  Comments: The patient was brought into the operating room and placed in the procedure chair in the appropriate position.  The area positive by previous biopsy was identified and confirmed with the patient. The area of clinically obvious tumor was debulked using a curette and/or scalpel as needed. An incision was made following the Mohs approach through the skin. The specimen was taken to the lab, divided into 1 piece(s) and appropriately chromacoded and processed.  Tumor features identified on Mohs section: no tumor identified  Depth of defect: subcutaneous fat    Patient tolerance of procedure: tolerated well, no immediate complications    Reconstruction:  Was the defect reconstructed?: No    Hemostasis achieved with: Gelfoam and electrodesiccation  Outcome: patient tolerated procedure well with no complications    Post-procedure details: sterile dressing applied and wound care instructions given    Dressing type: pressure dressing, Gelfoam, petrolatum, Hypafix and Telfa pad      Repair: After a discussion with the patient regarding the options for wound closure, a decision was made to proceed with second intention healing.  Dressing F/U: Surgifoam was placed in the wound. A pressure dressing was placed to help stabilize the wound and to minimize the risk of postoperative bleeding. Wound care was discussed, and the patient was given written post-operative wound care instructions.     Wound care was discussed, and the  patient was given written post-operative wound care instructions.            The patient will follow up with Abbi Wiggins MD as needed for any post operative problems or concerns, and will follow up with their primary dermatologist as scheduled.

## 2024-02-20 NOTE — LETTER
March 15, 2024     Carlita Zaman CNP  [address]  [address] [place] [id number]    Patient: Clara Rashid   YOB: 1960   Date of Visit: 2/20/2024       Dear Dr. Carlita Zaman CNP:    Thank you for referring Clara Rashid to me for evaluation. Below are my notes for this consultation.  If you have questions, please do not hesitate to call me. I look forward to following your patient along with you.       Sincerely,     Abbi Wiggins MD      CC: No Recipients  ______________________________________________________________________________________    Mohs Surgery Operative Note    Date of Surgery:  2/20/2024  Surgeon:  Abbi Wiggins MD  Office Location: [address]  [facility] [address]  Dept: [phone]  Dept Fax: [phone]  Referring Provider: KINDRA Trevino    Assessment/Plan  Pre-procedure:   Obtained informed consent: written from patient  The surgical site was identified and confirmed with the patient.     Intra-operative:   Audible time out called at 8:41 AM 02/20/24  by: Gabriella Oneal RN   Verified patient name, birthdate, site, specimen bottle label & requisition.    The planned procedure(s) was again reviewed with the patient. The risks of bleeding, infection, nerve damage and scarring were reviewed. Written authorization was obtained. The patient identity, surgical site, and planned procedure(s) were verified. The provider acted as both surgeon and pathologist.     Squamous cell carcinoma of skin of right lower limb, including hip  RIGHT DISTAL PRETIBIAL REGION  Mohs surgery  Consent obtained: written    Universal Protocol:  Procedure explained and questions answered to patient or proxy's satisfaction: Yes    Test results available and properly labeled: Yes    Pathology report reviewed: Yes    External notes reviewed: Yes    Photo or diagram used for site identification: Yes    Site/side marked: Yes    Slide  independently reviewed by Mohs surgeon: Yes    Immediately prior to procedure a time out was called: Yes    Patient identity confirmed: verbally with patient  Preparation: Patient was prepped and draped in usual sterile fashion      Anticoagulation:  Is the patient taking prescription anticoagulant and/or aspirin prescribed/recommended by a physician? No    Was the anticoagulation regimen changed prior to Mohs? No      Anesthesia:  Anesthesia method: local infiltration  Local anesthetic: lidocaine 0.5% WITH epi    Procedure Details:  Biopsy accession number: [id number]  Date of biopsy: 1/11/2024  Pre-Op diagnosis: squamous cell carcinoma  SCC subtype: KA type  Surgery side: right  Surgical site (from skin exam): RIGHT DISTAL PRETIBIAL REGION  Pre-operative length (cm): 1.3  Pre-operative width (cm): 1.6  Indications for Mohs surgery: anatomic location where tissue conservation is critical  Previously treated? No      Micrographic Surgery Details:  Post-operative length (cm): 1.4  Post-operative width (cm): 1.7  Number of Mohs stages: 1    Stage 1  Comments: The patient was brought into the operating room and placed in the procedure chair in the appropriate position.  The area positive by previous biopsy was identified and confirmed with the patient. The area of clinically obvious tumor was debulked using a curette and/or scalpel as needed. An incision was made following the Mohs approach through the skin. The specimen was taken to the lab, divided into 1 piece(s) and appropriately chromacoded and processed.  Tumor features identified on Mohs section: no tumor identified  Depth of defect: subcutaneous fat    Patient tolerance of procedure: tolerated well, no immediate complications    Reconstruction:  Was the defect reconstructed?: No    Hemostasis achieved with: Gelfoam and electrodesiccation  Outcome: patient tolerated procedure well with no complications    Post-procedure details: sterile dressing applied and  wound care instructions given    Dressing type: pressure dressing, Gelfoam, petrolatum, Hypafix and Telfa pad      Repair: After a discussion with the patient regarding the options for wound closure, a decision was made to proceed with second intention healing.  Dressing F/U: Surgifoam was placed in the wound. A pressure dressing was placed to help stabilize the wound and to minimize the risk of postoperative bleeding. Wound care was discussed, and the patient was given written post-operative wound care instructions.     Wound care was discussed, and the patient was given written post-operative wound care instructions.            The patient will follow up with Abbi Wiggins MD as needed for any post operative problems or concerns, and will follow up with their primary dermatologist as scheduled.        Office Visit Note  Date: 2/20/2024  Surgeon:  Abbi Wiggins MD  Office Location: [address]  [facility] [address]  Dept: [phone]  Dept Fax: [phone]  Referring Provider: KINDRA Trevino  Clara Rashid is a 63 y.o. female who presents for the following: MOHS Surgery (RIGHT DISTAL PRETIBIAL REGION- SCC)    According to the patient, the lesion has been present for approximately 6 months at the time of diagnosis.  The lesion was not healing after her fall.  The lesion has not been treated previously.    The patient does not have a pacemaker / defibrillator.  The patient does not have a heart valve / joint replacement.    The patient is not on blood thinners.  The patient does not have a history of hepatitis B or C.  The patient does not have a history of HIV.  The patient does not have a history of immunosuppression (e.g. organ transplantation, malignancy, medications)    Review of Systems:  No other skin or systemic complaints other than what is documented elsewhere in the note.    MEDICAL HISTORY: clinically relevant  history including significant past medical history, medications and allergies was reviewed and documented in Epic.    Objective  Well appearing patient in no apparent distress; mood and affect are within normal limits.  Vital signs: See record.  Noted on the RIGHT DISTAL PRETIBIAL REGION  Is a 1.3 x 1.6 cm scar      The patient confirmed the identified site.    Discussion:  The nature of the diagnosis was explained. The lesion is a skin cancer.  It has a risk of local growth and distant spread. The condition is associated with sun exposure.  Warning signs of non-melanoma skin cancer discussed. Patient was instructed to perform monthly self skin examination.  We recommended that the patient have regular full skin exams given an increased risk of subsequent skin cancers. The patient was instructed to use sun protective behaviors including use of broad spectrum sunscreens and sun protective clothing to reduce risk of skin cancers.      Risks, benefits, side effects of Mohs surgery were discussed with patient and the patient voiced understanding.  It was explained that even though the cure rate of Mohs is very high it is not 100%. Risks of surgery including but not limited to bleeding, infection, numbness, nerve damage, and scar were reviewed.  Discussion included wound care requirements, activity restrictions, likely scar outcome and time to heal.     After Mohs surgery, the defect may need to be repaired surgically and the scar may be longer than the original lesion.  Reconstruction options, risks, and benefits were reviewed including second intention healing, linear repair (4-1 ratio was explained), local flaps, skin grafts, cartilage grafts and interpolation flaps (the need for multiple surgeries was explained). Possible outcomes were reviewed including likely scar appearance, failure of flap survival, infection, bleeding and the need for revision surgery.     The pathology was reviewed, the photograph was reviewed,  and the referring physician's note was reviewed.    Patient elected for Mohs surgery.

## 2024-04-17 NOTE — PROGRESS NOTES
HPI:  Proc (3/8/22): TURBT large, R URS, stent placement, retrograde pyelogram  Path: Papillary urothelial carcinoma, low grade with focal high grade cytologic features, focal area suspicious for lamina propria invasion (TURBT), non-invasive papillary urothelial carcinoma, low grade (left bladder wall biopsy), partially-denuded urothelial mucosa with inflammation and focal atypia that does not meet the threshold for carcinoma in situ (deep bladder biopsy)     Proc (4/5/22): TURBT medium, stent exchange  Path: Partially denuded urothelial mucosa with chronic inflammation and proliferative changes, favor reactive, changes consistent with previous procedure, muscularis propria negative for carcinoma (TURBT), smooth muscle and adipose tissue negative for carcinoma (deep margin tumor biopsy)     Proc (9/6/22): TURBT small  Path: focal high-grade and predominantly low-grade papillary urothelial carcinoma, no definitive lamina propria invasion, muscularis propria uninvolved by carcinoma     63 year old female with hx of DM on metformin referred by Dr. Mcdonald with gross hematuria since September 2021. Went to OB/GYN who performed an US and endometrial biopsy which was XANDER. Had a UTI treated. Continued to have intermittent hematuria. Had a cystoscopy with Dr. Mcdonald (2/23/22) which showed a large tumor on the R lateral wall, and a small tumor on the L trigone. CT Urogram (2/28/22) with two synchronous urinary bladder masses, larger mass involves/surrounds right UVJ without obstructing it, smaller mass near/involving portion of left UVJ without obstructing it, a few nonspecific <6 mm solid indeterminate bibasilar lung nodules. S/p TURBT large (3/8/22) with pathology showing papillary urothelial carcinoma, low grade with focal high grade cytologic features, focal area suspicious for lamina propria invasion. Also s/p TURBT medium (4/5/22) with pathology showing partially denuded urothelial mucosa with chronic inflammation and  proliferative changes consistent with previous procedure, muscularis propria, smooth muscle and adipose tissue negative for carcinoma. Completed BCG. S/p TURBT small (9/6/22) with pathology showing focal high-grade and predominantly low-grade papillary urothelial carcinoma, no definitive lamina propria invasion, muscularis propria uninvolved by carcinoma. CT Urogram (9/29/23) showed no enhancing mass or filling defect in the bilateral ureters or bladder to suggest residual/recurrent disease and no evidence of distant disease. CT chest (9/29/23) showed few indeterminate less than 6 mm pulmonary nodules, otherwise, no suspicious pulmonary nodule or thoracic adenopathy. Presents for cystoscopy. Doing well.      Smoker x 43 years. Has quit smoking tobacco in February 22'. Continues to smoke marijuana   SHx: Mohs, rotator cuff, 2 C-sections.   FHx: Grandmother with bladder cancer (age 90). Did not die of disease.     Cre: 0.83 (2/23/22)     CT Urogram (2/28/22): Two synchronous urinary bladder masses. The larger mass involves/surrounds the right UVJ without obstructing it. The smaller mass is near/involving a portion of the left UVJ without obstructing it. A few nonspecific <6 mm solid indeterminate bibasilar lung nodules.     CT Urogram (9/29/23): no enhancing mass or filling defect in the bilateral ureters or bladder to suggest residual/recurrent disease and no evidence of distant disease.    CT chest (9/29/23): few indeterminate less than 6 mm pulmonary nodules, otherwise, no suspicious pulmonary nodule or thoracic adenopathy.    Review of Systems:  All systems reviewed. Anything negative noted in the HPI.    Physical Exam:  Vitals signs reviewed.  Constitutional:      Appearance: Well-developed.  HENT:     Head: Normocephalic and atraumatic.  Neck:     Musculoskeletal: Normal range of motion.  Pulmonary:     Effort: Pulmonary effort is normal.  Musculoskeletal: Normal range of motion.  Skin:     General: Skin is warm  and dry.  Neurological:     Mental Status: Alert and oriented to person, place, and time.  Psychiatric:        Behavior: Behavior normal.        Thought Content: Thought content normal.        Judgment: Judgment normal.    Cystoscopy    Date/Time: 4/18/2024 9:22 AM    Performed by: Fazal Mendoza MD  Authorized by: Fazal Mendoza MD    Procedure - Bladder Cystoscopy:     Procedure details: fulguration    Post-procedure:     Patient tolerance: Patient tolerated the procedure well with no immediate complications      Comments:      papillary lesion on right posterior wall adjacent to old scar approximately 1 cm, biopsied and fulgurated.         Assessment/Plan   63 year old female with hx of DM on metformin referred by Dr. Mcdonald with gross hematuria since September 2021. Went to OB/GYN who performed an US and endometrial biopsy which was XANDER. Had a UTI treated. Continued to have intermittent hematuria. Had a cystoscopy with Dr. Mcdonald (2/23/22) which showed a large tumor on the R lateral wall, and a small tumor on the L trigone. CT Urogram (2/28/22) with two synchronous urinary bladder masses, larger mass involves/surrounds right UVJ without obstructing it, smaller mass near/involving portion of left UVJ without obstructing it, a few nonspecific <6 mm solid indeterminate bibasilar lung nodules. S/p TURBT large (3/8/22) with pathology showing papillary urothelial carcinoma, low grade with focal high grade cytologic features, focal area suspicious for lamina propria invasion. Also s/p TURBT medium (4/5/22) with pathology showing partially denuded urothelial mucosa with chronic inflammation and proliferative changes consistent with previous procedure, muscularis propria, smooth muscle and adipose tissue negative for carcinoma. Completed BCG. S/p TURBT small (9/6/22) with pathology showing focal high-grade and predominantly low-grade papillary urothelial carcinoma, no definitive lamina propria invasion, muscularis  propria uninvolved by carcinoma. CT Urogram (9/29/23) showed no enhancing mass or filling defect in the bilateral ureters or bladder to suggest residual/recurrent disease and no evidence of distant disease. CT chest (9/29/23) showed few indeterminate less than 6 mm pulmonary nodules, otherwise, no suspicious pulmonary nodule or thoracic adenopathy. Doing well. Management options including risks, benefits and alternatives discussed at length and all questions answered. Patient prefers to proceed with follow-up in 3mos for cystoscopy.       Rx Keflex 2 days        Scribe Attestation  By signing my name below, IMercedes, Scrparveen   attest that this documentation has been prepared under the direction and in the presence of Fazal Mendoza MD.

## 2024-04-18 ENCOUNTER — PROCEDURE VISIT (OUTPATIENT)
Dept: UROLOGY | Facility: CLINIC | Age: 64
End: 2024-04-18
Payer: COMMERCIAL

## 2024-04-18 ENCOUNTER — APPOINTMENT (OUTPATIENT)
Dept: UROLOGY | Facility: CLINIC | Age: 64
End: 2024-04-18
Payer: COMMERCIAL

## 2024-04-18 VITALS — DIASTOLIC BLOOD PRESSURE: 77 MMHG | HEART RATE: 92 BPM | SYSTOLIC BLOOD PRESSURE: 127 MMHG | TEMPERATURE: 97.1 F

## 2024-04-18 DIAGNOSIS — Z79.2 NEED FOR PROPHYLACTIC ANTIBIOTIC: Primary | ICD-10-CM

## 2024-04-18 DIAGNOSIS — C67.9 MALIGNANT NEOPLASM OF URINARY BLADDER, UNSPECIFIED SITE (MULTI): ICD-10-CM

## 2024-04-18 LAB
POC APPEARANCE, URINE: CLEAR
POC BILIRUBIN, URINE: ABNORMAL
POC BLOOD, URINE: NEGATIVE
POC COLOR, URINE: YELLOW
POC GLUCOSE, URINE: NEGATIVE MG/DL
POC KETONES, URINE: ABNORMAL MG/DL
POC LEUKOCYTES, URINE: NEGATIVE
POC NITRITE,URINE: NEGATIVE
POC PH, URINE: 5 PH
POC PROTEIN, URINE: NEGATIVE MG/DL
POC SPECIFIC GRAVITY, URINE: >=1.03
POC UROBILINOGEN, URINE: 0.2 EU/DL

## 2024-04-18 PROCEDURE — 52214 CYSTOSCOPY AND TREATMENT: CPT | Performed by: STUDENT IN AN ORGANIZED HEALTH CARE EDUCATION/TRAINING PROGRAM

## 2024-04-18 PROCEDURE — 81002 URINALYSIS NONAUTO W/O SCOPE: CPT | Performed by: STUDENT IN AN ORGANIZED HEALTH CARE EDUCATION/TRAINING PROGRAM

## 2024-04-18 PROCEDURE — 88305 TISSUE EXAM BY PATHOLOGIST: CPT

## 2024-04-18 PROCEDURE — 88112 CYTOPATH CELL ENHANCE TECH: CPT

## 2024-04-18 PROCEDURE — 88112 CYTOPATH CELL ENHANCE TECH: CPT | Performed by: PATHOLOGY

## 2024-04-18 PROCEDURE — 88305 TISSUE EXAM BY PATHOLOGIST: CPT | Performed by: PATHOLOGY

## 2024-04-18 RX ORDER — CEPHALEXIN 500 MG/1
500 CAPSULE ORAL 2 TIMES DAILY
Qty: 4 CAPSULE | Refills: 0 | Status: SHIPPED | OUTPATIENT
Start: 2024-04-18 | End: 2024-04-20

## 2024-04-18 ASSESSMENT — PAIN SCALES - GENERAL: PAINLEVEL: 0-NO PAIN

## 2024-04-24 LAB
LABORATORY COMMENT REPORT: NORMAL
LABORATORY COMMENT REPORT: NORMAL
PATH REPORT.FINAL DX SPEC: NORMAL
PATH REPORT.GROSS SPEC: NORMAL
PATH REPORT.RELEVANT HX SPEC: NORMAL
PATH REPORT.TOTAL CANCER: NORMAL

## 2024-04-26 LAB
LABORATORY COMMENT REPORT: NORMAL
PATH REPORT.FINAL DX SPEC: NORMAL
PATH REPORT.GROSS SPEC: NORMAL
PATH REPORT.RELEVANT HX SPEC: NORMAL
PATH REPORT.TOTAL CANCER: NORMAL

## 2024-04-30 ASSESSMENT — ENCOUNTER SYMPTOMS
HEADACHES: 0
POLYPHAGIA: 0
DIZZINESS: 0
WEIGHT LOSS: 0
BLURRED VISION: 0
SEIZURES: 0
SWEATS: 0
WEAKNESS: 0
FATIGUE: 0
NERVOUS/ANXIOUS: 0
TREMORS: 0
SPEECH DIFFICULTY: 0
CONFUSION: 0
POLYDIPSIA: 0
BLACKOUTS: 0
HUNGER: 0
VISUAL CHANGE: 0

## 2024-05-02 ENCOUNTER — OFFICE VISIT (OUTPATIENT)
Dept: PRIMARY CARE | Facility: CLINIC | Age: 64
End: 2024-05-02
Payer: COMMERCIAL

## 2024-05-02 VITALS
RESPIRATION RATE: 16 BRPM | OXYGEN SATURATION: 98 % | WEIGHT: 172.6 LBS | BODY MASS INDEX: 30.58 KG/M2 | SYSTOLIC BLOOD PRESSURE: 121 MMHG | HEART RATE: 90 BPM | HEIGHT: 63 IN | DIASTOLIC BLOOD PRESSURE: 74 MMHG

## 2024-05-02 DIAGNOSIS — Z72.0 TOBACCO USE: ICD-10-CM

## 2024-05-02 DIAGNOSIS — J41.0 SIMPLE CHRONIC BRONCHITIS (MULTI): ICD-10-CM

## 2024-05-02 DIAGNOSIS — E78.2 MIXED HYPERLIPIDEMIA: ICD-10-CM

## 2024-05-02 DIAGNOSIS — I10 ESSENTIAL (PRIMARY) HYPERTENSION: ICD-10-CM

## 2024-05-02 DIAGNOSIS — E11.9 TYPE 2 DIABETES MELLITUS WITHOUT COMPLICATION, WITHOUT LONG-TERM CURRENT USE OF INSULIN (MULTI): Primary | ICD-10-CM

## 2024-05-02 LAB — POC HEMOGLOBIN A1C: 6.2 % (ref 4.2–6.5)

## 2024-05-02 PROCEDURE — 4010F ACE/ARB THERAPY RXD/TAKEN: CPT

## 2024-05-02 PROCEDURE — 83036 HEMOGLOBIN GLYCOSYLATED A1C: CPT

## 2024-05-02 PROCEDURE — 3074F SYST BP LT 130 MM HG: CPT

## 2024-05-02 PROCEDURE — 3078F DIAST BP <80 MM HG: CPT

## 2024-05-02 PROCEDURE — 99214 OFFICE O/P EST MOD 30 MIN: CPT

## 2024-05-02 PROCEDURE — 1036F TOBACCO NON-USER: CPT

## 2024-05-02 RX ORDER — LISINOPRIL 5 MG/1
5 TABLET ORAL DAILY
Qty: 90 TABLET | Refills: 1 | Status: SHIPPED | OUTPATIENT
Start: 2024-05-02 | End: 2024-10-29

## 2024-05-02 RX ORDER — METFORMIN HYDROCHLORIDE 500 MG/1
500 TABLET, EXTENDED RELEASE ORAL 2 TIMES DAILY
Qty: 180 TABLET | Refills: 1 | Status: SHIPPED | OUTPATIENT
Start: 2024-05-02 | End: 2024-10-29

## 2024-05-02 RX ORDER — ROSUVASTATIN CALCIUM 5 MG/1
5 TABLET, COATED ORAL DAILY
Qty: 90 TABLET | Refills: 1 | Status: SHIPPED | OUTPATIENT
Start: 2024-05-02 | End: 2024-10-29

## 2024-05-02 ASSESSMENT — ENCOUNTER SYMPTOMS
DEPRESSION: 0
SEIZURES: 0
OCCASIONAL FEELINGS OF UNSTEADINESS: 0
HUNGER: 0
WEAKNESS: 0
BLACKOUTS: 0
POLYPHAGIA: 0
HEADACHES: 0
NERVOUS/ANXIOUS: 0
LOSS OF SENSATION IN FEET: 0
TREMORS: 0
POLYDIPSIA: 0
FATIGUE: 0
SPEECH DIFFICULTY: 0
VISUAL CHANGE: 0
CONFUSION: 0
BLURRED VISION: 0
DIZZINESS: 0
SWEATS: 0
WEIGHT LOSS: 0

## 2024-05-02 ASSESSMENT — PATIENT HEALTH QUESTIONNAIRE - PHQ9
2. FEELING DOWN, DEPRESSED OR HOPELESS: NOT AT ALL
SUM OF ALL RESPONSES TO PHQ9 QUESTIONS 1 AND 2: 0
1. LITTLE INTEREST OR PLEASURE IN DOING THINGS: NOT AT ALL

## 2024-05-02 ASSESSMENT — PAIN SCALES - GENERAL: PAINLEVEL: 0-NO PAIN

## 2024-05-02 ASSESSMENT — COLUMBIA-SUICIDE SEVERITY RATING SCALE - C-SSRS
1. IN THE PAST MONTH, HAVE YOU WISHED YOU WERE DEAD OR WISHED YOU COULD GO TO SLEEP AND NOT WAKE UP?: NO
6. HAVE YOU EVER DONE ANYTHING, STARTED TO DO ANYTHING, OR PREPARED TO DO ANYTHING TO END YOUR LIFE?: NO
2. HAVE YOU ACTUALLY HAD ANY THOUGHTS OF KILLING YOURSELF?: NO

## 2024-05-02 NOTE — PATIENT INSTRUCTIONS
Continue current medication.  Continue work on diet - recommend lots of fruits and vegetables, lean protein like chicken, turkey, fish, beans and Greek yogurt. Try to choose healthier carbohydrate options like oatmeal, wheat bread and pasta, sweet potatoes. Limit sugary treats.  Check a fasting sugar first thing in the AM twice daily and keep a log of the results to bring to your next office visit.  Please contact office if your sugars are consistently >140.  Reevaluate in 3 months.     HYPERLIPIDEMIA:  Decrease intake of saturated fats, fast food, sweets.  Increase intake of fresh fruit fresh vegetables and lean meats.  Increase healthy fats seeds, nuts, olive oil instead of butter.  walk 150 minutes/week for heart health.  HYPERTENSION:   Decrease intake of processed foods, fast foods, lunch meat, canned soups, canned veggies.  Increase intake of fresh fruits, veggies, and lean meats. Monitor blood pressure at home, keep a log and bring this with you to your next appointment. Call the office if your blood pressure runs 150/90 or higher consistently.

## 2024-05-02 NOTE — PROGRESS NOTES
Subjective   Patient ID: Clara Rashid is a 63 y.o. female who presents for 3 month follow up.    Diabetes  She has type 2 diabetes mellitus. No MedicAlert identification noted. Pertinent negatives for hypoglycemia include no confusion, dizziness, headaches, hunger, mood changes, nervousness/anxiousness, pallor, seizures, sleepiness, speech difficulty, sweats or tremors. Pertinent negatives for diabetes include no blurred vision, no chest pain, no fatigue, no foot paresthesias, no foot ulcerations, no polydipsia, no polyphagia, no polyuria, no visual change, no weakness and no weight loss. Pertinent negatives for hypoglycemia complications include no blackouts, no hospitalization, no nocturnal hypoglycemia and no required glucagon injection. Symptoms are stable. Pertinent negatives for diabetic complications include no CVA, heart disease, impotence, nephropathy, peripheral neuropathy, PVD or retinopathy. Risk factors for coronary artery disease include dyslipidemia, family history, hypertension, obesity and stress. Current diabetic treatment includes oral agent (dual therapy). She is compliant with treatment most of the time. Her weight is decreasing steadily. She is following a generally healthy diet. Meal planning includes avoidance of concentrated sweets and carbohydrate counting. She has not had a previous visit with a dietitian. She participates in exercise three times a week. Blood glucose monitoring compliance is good. Her home blood glucose trend is fluctuating minimally. Her breakfast blood glucose is taken between 7-8 am. Her breakfast blood glucose range is generally 110-130 mg/dl. She does not see a podiatrist.Eye exam is current.      Patient notes that she did have a low-grade bladder tumor noted.  She is waiting on the biopsy results to discern BCG treatments  Review of Systems   Constitutional:  Negative for fatigue and weight loss.   Eyes:  Negative for blurred vision.   Cardiovascular:  Negative  "for chest pain.   Endocrine: Negative for polydipsia, polyphagia and polyuria.   Genitourinary:  Negative for impotence.   Skin:  Negative for pallor.   Neurological:  Negative for dizziness, tremors, seizures, speech difficulty, weakness and headaches.   Psychiatric/Behavioral:  Negative for confusion. The patient is not nervous/anxious.        Current Outpatient Medications:     clindamycin (Cleocin T) 1 % lotion, Apply topically. Apply to affected areas 1 to 2 times daily as needed, Disp: , Rfl:     [START ON 5/5/2024] dulaglutide 3 mg/0.5 mL pen injector, Inject 3 mg under the skin 1 (one) time per week., Disp: 3 mL, Rfl: 1    lisinopril 5 mg tablet, Take 1 tablet (5 mg) by mouth once daily., Disp: 90 tablet, Rfl: 1    metFORMIN  mg 24 hr tablet, Take 1 tablet (500 mg) by mouth 2 times a day., Disp: 180 tablet, Rfl: 1    rosuvastatin (Crestor) 5 mg tablet, Take 1 tablet (5 mg) by mouth once daily., Disp: 90 tablet, Rfl: 1    Objective   /74 (BP Location: Left arm, Patient Position: Sitting, BP Cuff Size: Adult)   Pulse 90   Resp 16   Ht 1.6 m (5' 3\")   Wt 78.3 kg (172 lb 9.6 oz)   SpO2 98%   BMI 30.57 kg/m²     Physical Exam  Vitals reviewed.   Cardiovascular:      Rate and Rhythm: Normal rate.   Pulmonary:      Effort: Pulmonary effort is normal.   Musculoskeletal:         General: Normal range of motion.      Cervical back: Normal range of motion.   Neurological:      General: No focal deficit present.      Mental Status: She is alert.   Psychiatric:         Mood and Affect: Mood normal.         Assessment/Plan   Diagnoses and all orders for this visit:  Type 2 diabetes mellitus without complication, without long-term current use of insulin (Multi)  -     POCT glycosylated hemoglobin (Hb A1C) manually resulted  -     metFORMIN  mg 24 hr tablet; Take 1 tablet (500 mg) by mouth 2 times a day.  -     dulaglutide 3 mg/0.5 mL pen injector; Inject 3 mg under the skin 1 (one) time per " week.  Mixed hyperlipidemia  -     rosuvastatin (Crestor) 5 mg tablet; Take 1 tablet (5 mg) by mouth once daily.  Essential (primary) hypertension  -     lisinopril 5 mg tablet; Take 1 tablet (5 mg) by mouth once daily.  Simple chronic bronchitis (Multi)  Tobacco use      Time Spent  Prep time on day of patient encounter: 5 minutes  Time spent directly with patient, family or caregiver: 15 minutes  Documentation Time: 5 minutes    Continue current medication.  Continue work on diet - recommend lots of fruits and vegetables, lean protein like chicken, turkey, fish, beans and Greek yogurt. Try to choose healthier carbohydrate options like oatmeal, wheat bread and pasta, sweet potatoes. Limit sugary treats.  Check a fasting sugar first thing in the AM twice daily and keep a log of the results to bring to your next office visit.  Please contact office if your sugars are consistently >140.  Reevaluate in 3 months.     HYPERLIPIDEMIA:  Decrease intake of saturated fats, fast food, sweets.  Increase intake of fresh fruit fresh vegetables and lean meats.  Increase healthy fats seeds, nuts, olive oil instead of butter.  walk 150 minutes/week for heart health.  HYPERTENSION:   Decrease intake of processed foods, fast foods, lunch meat, canned soups, canned veggies.  Increase intake of fresh fruits, veggies, and lean meats. Monitor blood pressure at home, keep a log and bring this with you to your next appointment. Call the office if your blood pressure runs 150/90 or higher consistently.  *This note was dictated using DRAGON speech recognition software and was corrected for spelling or grammatical errors, but despite proofreading several typographical errors might be present that might affect the meaning of the content.*  Gladys Fierro, CNP

## 2024-06-26 ENCOUNTER — PATIENT MESSAGE (OUTPATIENT)
Dept: PRIMARY CARE | Facility: CLINIC | Age: 64
End: 2024-06-26
Payer: COMMERCIAL

## 2024-06-26 DIAGNOSIS — E11.9 TYPE 2 DIABETES MELLITUS WITHOUT COMPLICATION, WITHOUT LONG-TERM CURRENT USE OF INSULIN (MULTI): ICD-10-CM

## 2024-07-18 ENCOUNTER — APPOINTMENT (OUTPATIENT)
Dept: UROLOGY | Facility: CLINIC | Age: 64
End: 2024-07-18
Payer: COMMERCIAL

## 2024-07-18 VITALS
HEART RATE: 99 BPM | HEIGHT: 63 IN | TEMPERATURE: 97.6 F | WEIGHT: 170 LBS | SYSTOLIC BLOOD PRESSURE: 142 MMHG | BODY MASS INDEX: 30.12 KG/M2 | DIASTOLIC BLOOD PRESSURE: 78 MMHG

## 2024-07-18 DIAGNOSIS — C67.9 MALIGNANT NEOPLASM OF URINARY BLADDER, UNSPECIFIED SITE (MULTI): Primary | ICD-10-CM

## 2024-07-18 LAB
POC APPEARANCE, URINE: CLEAR
POC BILIRUBIN, URINE: NEGATIVE
POC BLOOD, URINE: NEGATIVE
POC COLOR, URINE: YELLOW
POC GLUCOSE, URINE: NEGATIVE MG/DL
POC KETONES, URINE: NEGATIVE MG/DL
POC LEUKOCYTES, URINE: NEGATIVE
POC NITRITE,URINE: NEGATIVE
POC PH, URINE: 5.5 PH
POC PROTEIN, URINE: NEGATIVE MG/DL
POC SPECIFIC GRAVITY, URINE: 1.01
POC UROBILINOGEN, URINE: 0.2 EU/DL

## 2024-07-18 PROCEDURE — 99213 OFFICE O/P EST LOW 20 MIN: CPT | Performed by: STUDENT IN AN ORGANIZED HEALTH CARE EDUCATION/TRAINING PROGRAM

## 2024-07-18 PROCEDURE — 81003 URINALYSIS AUTO W/O SCOPE: CPT | Performed by: STUDENT IN AN ORGANIZED HEALTH CARE EDUCATION/TRAINING PROGRAM

## 2024-07-18 PROCEDURE — 52000 CYSTOURETHROSCOPY: CPT | Performed by: STUDENT IN AN ORGANIZED HEALTH CARE EDUCATION/TRAINING PROGRAM

## 2024-07-18 RX ORDER — CEPHALEXIN 250 MG/1
250 CAPSULE ORAL ONCE
COMMUNITY

## 2024-07-18 ASSESSMENT — PAIN SCALES - GENERAL: PAINLEVEL: 0-NO PAIN

## 2024-07-18 NOTE — PROGRESS NOTES
HPI:  Proc (3/8/22): TURBT large, R URS, stent placement, retrograde pyelogram  Path: Papillary urothelial carcinoma, low grade with focal high grade cytologic features, focal area suspicious for lamina propria invasion (TURBT), non-invasive papillary urothelial carcinoma, low grade (left bladder wall biopsy), partially-denuded urothelial mucosa with inflammation and focal atypia that does not meet the threshold for carcinoma in situ (deep bladder biopsy)     Proc (4/5/22): TURBT medium, stent exchange  Path: Partially denuded urothelial mucosa with chronic inflammation and proliferative changes, favor reactive, changes consistent with previous procedure, muscularis propria negative for carcinoma (TURBT), smooth muscle and adipose tissue negative for carcinoma (deep margin tumor biopsy)     Proc (9/6/22): TURBT small  Path: focal high-grade and predominantly low-grade papillary urothelial carcinoma, no definitive lamina propria invasion, muscularis propria uninvolved by carcinoma    BCG induction 2x     63 year old female referred by Dr. Mcdonald with gross hematuria. Patient had a cystoscopy with Dr. Mcdonald (2/23/22) which showed a large tumor on the R lateral wall, and a small tumor on the L trigone. CT Urogram (2/28/22) with two synchronous urinary bladder masses, larger mass involves/surrounds right UVJ without obstructing it, smaller mass near/involving portion of left UVJ without obstructing it, a few nonspecific <6 mm solid indeterminate bibasilar lung nodules. S/p TURBT large (3/8/22) with pathology showing papillary urothelial carcinoma, low grade with focal high grade cytologic features, focal area suspicious for lamina propria invasion. S/p TURBT medium (4/5/22) with pathology showing partially denuded urothelial mucosa with chronic inflammation and proliferative changes consistent with previous procedure, muscularis propria, smooth muscle and adipose tissue negative for carcinoma. Completed BCG. S/p TURBT  small (9/6/22) with pathology showing focal high-grade and predominantly low-grade papillary urothelial carcinoma, no definitive lamina propria invasion, muscularis propria uninvolved by carcinoma. CT Urogram (9/29/23) showed no enhancing mass or filling defect in the bilateral ureters or bladder to suggest residual/recurrent disease and no evidence of distant disease. CT chest (9/29/23) showed few indeterminate less than 6 mm pulmonary nodules, otherwise, no suspicious pulmonary nodule or thoracic adenopathy. Presents for cystoscopy. Reports UTI symptoms started last week, hematuria, urinary retention, and dysuria. She started abx which resolved symptoms. Doing well.      Smoker x 43 years. Has quit smoking tobacco in February 22'. Continues to smoke marijuana   SHx: Mohs, rotator cuff, 2 C-sections.   FHx: Grandmother with bladder cancer (age 90). Did not die of disease.     Cre: 0.83 (2/23/22)     CT Urogram (2/28/22): Two synchronous urinary bladder masses. The larger mass involves/surrounds the right UVJ without obstructing it. The smaller mass is near/involving a portion of the left UVJ without obstructing it. A few nonspecific <6 mm solid indeterminate bibasilar lung nodules.     CT Urogram (9/29/23): no enhancing mass or filling defect in the bilateral ureters or bladder to suggest residual/recurrent disease and no evidence of distant disease.    CT chest (9/29/23): few indeterminate less than 6 mm pulmonary nodules, otherwise, no suspicious pulmonary nodule or thoracic adenopathy.    Review of Systems:  All systems reviewed. Anything negative noted in the HPI.    Physical Exam:  Vitals signs reviewed.  Constitutional:      Appearance: Well-developed.  HENT:     Head: Normocephalic and atraumatic.  Neck:     Musculoskeletal: Normal range of motion.  Pulmonary:     Effort: Pulmonary effort is normal.  Musculoskeletal: Normal range of motion.  Skin:     General: Skin is warm and dry.  Neurological:     Mental  Status: Alert and oriented to person, place, and time.  Psychiatric:        Behavior: Behavior normal.        Thought Content: Thought content normal.        Judgment: Judgment normal.    Cystoscopy    Date/Time: 7/18/2024 3:16 PM    Performed by: Fazal Mendoza MD  Authorized by: Fazal Mendoza MD      Post-procedure:     Patient tolerance: Patient tolerated the procedure well with no immediate complications      Comments:      Papillary lesion along posterior wall, multiple erythematous areas, likely inflammation vs potential malignancy        Assessment/Plan   63 year old female referred by Dr. Mcdonald with gross hematuria. Patient had a cystoscopy with Dr. Mcdonald (2/23/22) which showed a large tumor on the R lateral wall, and a small tumor on the L trigone. S/p TURBT small (9/6/22) with pathology showing focal high-grade and predominantly low-grade papillary urothelial carcinoma, no definitive lamina propria invasion, muscularis propria uninvolved by carcinoma. CT Urogram (9/29/23) showed no enhancing mass or filling defect in the bilateral ureters or bladder to suggest residual/recurrent disease and no evidence of distant disease. CT chest (9/29/23) showed few indeterminate less than 6 mm pulmonary nodules, otherwise, no suspicious pulmonary nodule or thoracic adenopathy. Reports UTI symptoms started last week, hematuria, urinary retention, and dysuria. She started abx which resolved symptoms. Doing well. Management options including risks, benefits and alternatives discussed at length and all questions answered. Patient prefers to proceed with TURBT small with bluelight cystoscopy at [facility]. Will obtain CT urogram and CT chest at [facility].     Will plan on intravesical chemotherapy pending pathology results.              Scribe Attestation  By signing my name below, Mercedes FINE, [place]   attest that this documentation has been prepared under the direction and in the presence of Fazal Mendoza MD.

## 2024-07-22 DIAGNOSIS — N32.89 BLADDER MASS: ICD-10-CM

## 2024-07-24 PROBLEM — N32.89 BLADDER MASS: Status: ACTIVE | Noted: 2024-07-22

## 2024-07-24 RX ORDER — SODIUM CHLORIDE, SODIUM LACTATE, POTASSIUM CHLORIDE, CALCIUM CHLORIDE 600; 310; 30; 20 MG/100ML; MG/100ML; MG/100ML; MG/100ML
100 INJECTION, SOLUTION INTRAVENOUS CONTINUOUS
OUTPATIENT
Start: 2024-07-24

## 2024-07-31 ASSESSMENT — PROMIS GLOBAL HEALTH SCALE
CARRYOUT_SOCIAL_ACTIVITIES: GOOD
RATE_AVERAGE_PAIN: 0
RATE_PHYSICAL_HEALTH: GOOD
RATE_MENTAL_HEALTH: GOOD
RATE_SOCIAL_SATISFACTION: GOOD
EMOTIONAL_PROBLEMS: SOMETIMES
RATE_QUALITY_OF_LIFE: VERY GOOD
RATE_AVERAGE_FATIGUE: MODERATE
CARRYOUT_PHYSICAL_ACTIVITIES: COMPLETELY
RATE_GENERAL_HEALTH: GOOD

## 2024-08-02 ENCOUNTER — OFFICE VISIT (OUTPATIENT)
Dept: PRIMARY CARE | Facility: CLINIC | Age: 64
End: 2024-08-02
Payer: COMMERCIAL

## 2024-08-02 VITALS
SYSTOLIC BLOOD PRESSURE: 122 MMHG | OXYGEN SATURATION: 96 % | WEIGHT: 170.8 LBS | DIASTOLIC BLOOD PRESSURE: 76 MMHG | RESPIRATION RATE: 16 BRPM | BODY MASS INDEX: 30.26 KG/M2 | HEART RATE: 73 BPM | HEIGHT: 63 IN

## 2024-08-02 DIAGNOSIS — Z00.00 ENCOUNTER FOR ANNUAL PHYSICAL EXAM: Primary | ICD-10-CM

## 2024-08-02 DIAGNOSIS — J41.0 SIMPLE CHRONIC BRONCHITIS (MULTI): ICD-10-CM

## 2024-08-02 DIAGNOSIS — Z13.29 SCREENING FOR THYROID DISORDER: ICD-10-CM

## 2024-08-02 DIAGNOSIS — E11.9 TYPE 2 DIABETES MELLITUS WITHOUT COMPLICATION, WITHOUT LONG-TERM CURRENT USE OF INSULIN (MULTI): ICD-10-CM

## 2024-08-02 DIAGNOSIS — E78.2 MIXED HYPERLIPIDEMIA: ICD-10-CM

## 2024-08-02 DIAGNOSIS — E55.9 VITAMIN D DEFICIENCY: ICD-10-CM

## 2024-08-02 DIAGNOSIS — I83.90 VARICOSE VEINS OF ANKLE: ICD-10-CM

## 2024-08-02 DIAGNOSIS — E66.9 OBESITY WITH BODY MASS INDEX 30 OR GREATER: ICD-10-CM

## 2024-08-02 DIAGNOSIS — I10 ESSENTIAL (PRIMARY) HYPERTENSION: ICD-10-CM

## 2024-08-02 PROCEDURE — 1036F TOBACCO NON-USER: CPT

## 2024-08-02 PROCEDURE — 3008F BODY MASS INDEX DOCD: CPT

## 2024-08-02 PROCEDURE — 99396 PREV VISIT EST AGE 40-64: CPT

## 2024-08-02 PROCEDURE — 3078F DIAST BP <80 MM HG: CPT

## 2024-08-02 PROCEDURE — 3074F SYST BP LT 130 MM HG: CPT

## 2024-08-02 PROCEDURE — 4010F ACE/ARB THERAPY RXD/TAKEN: CPT

## 2024-08-02 RX ORDER — ALBUTEROL SULFATE 90 UG/1
2 AEROSOL, METERED RESPIRATORY (INHALATION) EVERY 6 HOURS PRN
Qty: 18 G | Refills: 1 | Status: SHIPPED | OUTPATIENT
Start: 2024-08-02 | End: 2024-08-02

## 2024-08-02 RX ORDER — ALBUTEROL SULFATE 90 UG/1
2 AEROSOL, METERED RESPIRATORY (INHALATION) EVERY 6 HOURS PRN
COMMUNITY
End: 2024-08-02 | Stop reason: SDUPTHER

## 2024-08-02 RX ORDER — ALBUTEROL SULFATE 90 UG/1
2 AEROSOL, METERED RESPIRATORY (INHALATION) EVERY 4 HOURS PRN
Qty: 6.7 G | Refills: 5 | Status: SHIPPED | OUTPATIENT
Start: 2024-08-02 | End: 2025-08-02

## 2024-08-02 ASSESSMENT — COLUMBIA-SUICIDE SEVERITY RATING SCALE - C-SSRS
2. HAVE YOU ACTUALLY HAD ANY THOUGHTS OF KILLING YOURSELF?: NO
6. HAVE YOU EVER DONE ANYTHING, STARTED TO DO ANYTHING, OR PREPARED TO DO ANYTHING TO END YOUR LIFE?: NO
1. IN THE PAST MONTH, HAVE YOU WISHED YOU WERE DEAD OR WISHED YOU COULD GO TO SLEEP AND NOT WAKE UP?: NO

## 2024-08-02 ASSESSMENT — PATIENT HEALTH QUESTIONNAIRE - PHQ9
SUM OF ALL RESPONSES TO PHQ9 QUESTIONS 1 AND 2: 0
1. LITTLE INTEREST OR PLEASURE IN DOING THINGS: NOT AT ALL
2. FEELING DOWN, DEPRESSED OR HOPELESS: NOT AT ALL

## 2024-08-02 ASSESSMENT — PAIN SCALES - GENERAL: PAINLEVEL: 0-NO PAIN

## 2024-08-02 ASSESSMENT — ENCOUNTER SYMPTOMS
OCCASIONAL FEELINGS OF UNSTEADINESS: 0
DEPRESSION: 0
LOSS OF SENSATION IN FEET: 0

## 2024-08-02 NOTE — PROGRESS NOTES
"Subjective   Patient ID: Clara Rashid is a 63 y.o. female who presents for Annual Exam.    HPI   Patient denies any falls, urgent care, ER, hospitalization, new diagnoses, surgeries in the past year.  Denies any issues with chest pain, chest pressure, shortness of breath, constipation, diarrhea, blood in stool, urinary urgency, frequency, blood in urine, muscle weakness in arms and legs, numbness or tingling in fingers or toes.  Patient will be having a [facility]URP August 20  urology department, from there they will determine the course of chemo treatments.  Fasting blood sugars generally 10 5-1 33.  Denies feeling any lows admits to sweating at times but she is not monitoring her blood sugars at that time she does not know if it is related to blood pressure, blood sugar or postmenopausal  She does have a area of concern on her left ankle/shin that she would like to have looked at  Review of Systems  Review of Systems negative except as noted in HPI and Chief complaint.    Current Outpatient Medications:     cephalexin (Keflex) 250 mg capsule, Take 1 capsule (250 mg) by mouth 1 time., Disp: , Rfl:     lisinopril 5 mg tablet, Take 1 tablet (5 mg) by mouth once daily., Disp: 90 tablet, Rfl: 1    metFORMIN  mg 24 hr tablet, Take 1 tablet (500 mg) by mouth 2 times a day., Disp: 180 tablet, Rfl: 1    rosuvastatin (Crestor) 5 mg tablet, Take 1 tablet (5 mg) by mouth once daily., Disp: 90 tablet, Rfl: 1    albuterol 90 mcg/actuation inhaler, Inhale 2 puffs every 6 hours if needed for wheezing., Disp: 18 g, Rfl: 1    [START ON 8/4/2024] dulaglutide 3 mg/0.5 mL pen injector, Inject 3 mg under the skin 1 (one) time per week., Disp: 2 mL, Rfl: 5    Objective   /76 (BP Location: Left arm, Patient Position: Sitting, BP Cuff Size: Adult)   Pulse 73   Resp 16   Ht 1.6 m (5' 3\")   Wt 77.5 kg (170 lb 12.8 oz)   SpO2 96%   BMI 30.26 kg/m²     Physical Exam  Vitals reviewed.   Constitutional:       General: She is not " in acute distress.     Appearance: Normal appearance.   HENT:      Head: Normocephalic.      Right Ear: Tympanic membrane normal.      Left Ear: Tympanic membrane normal.      Nose: Nose normal.      Mouth/Throat:      Mouth: Mucous membranes are moist.      Pharynx: Oropharynx is clear.   Eyes:      Extraocular Movements: Extraocular movements intact.      Conjunctiva/sclera: Conjunctivae normal.      Pupils: Pupils are equal, round, and reactive to light.   Cardiovascular:      Rate and Rhythm: Normal rate.      Pulses: Normal pulses.      Heart sounds: Normal heart sounds.   Pulmonary:      Effort: Pulmonary effort is normal.      Breath sounds: Normal breath sounds.   Abdominal:      General: Abdomen is flat. Bowel sounds are normal.      Palpations: Abdomen is soft.   Musculoskeletal:         General: Normal range of motion.   Skin:     General: Skin is warm and dry.      Capillary Refill: Capillary refill takes 2 to 3 seconds.   Neurological:      General: No focal deficit present.      Mental Status: She is alert and oriented to person, place, and time. Mental status is at baseline.   Psychiatric:         Mood and Affect: Mood normal.         Behavior: Behavior is cooperative.         Assessment/Plan   Diagnoses and all orders for this visit:  Encounter for annual physical exam  -     Comprehensive Metabolic Panel; Future  -     CBC and Auto Differential; Future  Mixed hyperlipidemia  -     Lipid Panel; Future  Essential (primary) hypertension  Type 2 diabetes mellitus without complication, without long-term current use of insulin (Multi)  -     Hemoglobin A1C; Future  -     dulaglutide 3 mg/0.5 mL pen injector; Inject 3 mg under the skin 1 (one) time per week.  Vitamin D deficiency  -     Vitamin D 25-Hydroxy,Total (for eval of Vitamin D levels); Future  Obesity with body mass index 30 or greater  Screening for thyroid disorder  -     TSH with reflex to Free T4 if abnormal; Future  Simple chronic bronchitis  (Multi)  -     albuterol 90 mcg/actuation inhaler; Inhale 2 puffs every 6 hours if needed for wheezing.  Varicose veins of ankle  -     Referral to Vascular Surgery; Future  LAB Order/ BLOOD TESTS   I have ordered lab work for you to get done. This should be fasting. Nothing to eat or drink after midnight besides black tea,  black coffee, or water. If you do not hear from this office within two days of having your labs done, please call for your results.     HYPERLIPIDEMIA:  Your cholesterol level is elevated. Decrease intake of saturated fats, fast food, sweets.  Increase intake of fresh fruit fresh vegetables and lean meats.  Increase healthy fats seeds, nuts, olive oil instead of butter.  walk 150 minutes/week for heart health.    HYPERTENSION:   Decrease intake of processed foods, fast foods, lunch meat, canned soups, canned veggies.  Increase intake of fresh fruits, veggies, and lean meats. Monitor blood pressure at home, keep a log and bring this with you to your next appointment. Call the office if your blood pressure runs 150/90 or higher consistently.    Blood Pressure Technique:  Sit quietly in a chair for 5 minutes with back supported and feet on the floor  Then place left arm on a table or armrest so bicep area is at the same level of heart or left breast  Check three times in a row, disregard the highest reading and average the other two    Diabetes Type 2  Continue current medication.  Continue work on diet - recommend lots of fruits and vegetables, lean protein like chicken, turkey, fish, beans and Greek yogurt. Try to choose healthier carbohydrate options like oatmeal, wheat bread and pasta, sweet potatoes. Limit sugary treats.  Check a fasting sugar first thing in the AM once daily and keep a log of the results to bring to your next office visit.  Please contact office if your sugars are consistently >140.  Reevaluate in 3 months.     *This note was dictated using DRAGON speech recognition software  and was corrected for spelling or grammatical errors, but despite proofreading several typographical errors might be present that might affect the meaning of the content.*  Gladys Fierro, CNP

## 2024-08-02 NOTE — PATIENT INSTRUCTIONS
LAB Order/ BLOOD TESTS   I have ordered lab work for you to get done. This should be fasting. Nothing to eat or drink after midnight besides black tea,  black coffee, or water. If you do not hear from this office within two days of having your labs done, please call for your results.     HYPERLIPIDEMIA:  Your cholesterol level is elevated. Decrease intake of saturated fats, fast food, sweets.  Increase intake of fresh fruit fresh vegetables and lean meats.  Increase healthy fats seeds, nuts, olive oil instead of butter.  walk 150 minutes/week for heart health.    HYPERTENSION:   Decrease intake of processed foods, fast foods, lunch meat, canned soups, canned veggies.  Increase intake of fresh fruits, veggies, and lean meats. Monitor blood pressure at home, keep a log and bring this with you to your next appointment. Call the office if your blood pressure runs 150/90 or higher consistently.    Blood Pressure Technique:  Sit quietly in a chair for 5 minutes with back supported and feet on the floor  Then place left arm on a table or armrest so bicep area is at the same level of heart or left breast  Check three times in a row, disregard the highest reading and average the other two    Diabetes Type 2  Continue current medication.  Continue work on diet - recommend lots of fruits and vegetables, lean protein like chicken, turkey, fish, beans and Greek yogurt. Try to choose healthier carbohydrate options like oatmeal, wheat bread and pasta, sweet potatoes. Limit sugary treats.  Check a fasting sugar first thing in the AM once daily and keep a log of the results to bring to your next office visit.  Please contact office if your sugars are consistently >140.  Reevaluate in 3 months.

## 2024-08-06 ENCOUNTER — APPOINTMENT (OUTPATIENT)
Dept: PREADMISSION TESTING | Facility: HOSPITAL | Age: 64
End: 2024-08-06
Payer: COMMERCIAL

## 2024-08-08 DIAGNOSIS — E11.9 TYPE 2 DIABETES MELLITUS WITHOUT COMPLICATION, WITHOUT LONG-TERM CURRENT USE OF INSULIN (MULTI): ICD-10-CM

## 2024-08-12 ENCOUNTER — PRE-ADMISSION TESTING (OUTPATIENT)
Dept: PREADMISSION TESTING | Facility: HOSPITAL | Age: 64
End: 2024-08-12
Payer: COMMERCIAL

## 2024-08-12 ENCOUNTER — LAB (OUTPATIENT)
Dept: LAB | Facility: LAB | Age: 64
End: 2024-08-12
Payer: COMMERCIAL

## 2024-08-12 ENCOUNTER — TELEPHONE (OUTPATIENT)
Dept: PRIMARY CARE | Facility: CLINIC | Age: 64
End: 2024-08-12

## 2024-08-12 VITALS
WEIGHT: 169.4 LBS | SYSTOLIC BLOOD PRESSURE: 121 MMHG | BODY MASS INDEX: 30.02 KG/M2 | HEIGHT: 63 IN | HEART RATE: 86 BPM | OXYGEN SATURATION: 97 % | DIASTOLIC BLOOD PRESSURE: 76 MMHG | TEMPERATURE: 98.6 F

## 2024-08-12 DIAGNOSIS — N32.89 BLADDER MASS: ICD-10-CM

## 2024-08-12 DIAGNOSIS — Z13.29 SCREENING FOR THYROID DISORDER: ICD-10-CM

## 2024-08-12 DIAGNOSIS — Z00.00 ENCOUNTER FOR ANNUAL PHYSICAL EXAM: ICD-10-CM

## 2024-08-12 DIAGNOSIS — E11.9 TYPE 2 DIABETES MELLITUS WITHOUT COMPLICATION, WITHOUT LONG-TERM CURRENT USE OF INSULIN (MULTI): ICD-10-CM

## 2024-08-12 DIAGNOSIS — E55.9 VITAMIN D DEFICIENCY: ICD-10-CM

## 2024-08-12 DIAGNOSIS — E78.2 MIXED HYPERLIPIDEMIA: ICD-10-CM

## 2024-08-12 DIAGNOSIS — E55.9 VITAMIN D DEFICIENCY: Primary | ICD-10-CM

## 2024-08-12 LAB
25(OH)D3 SERPL-MCNC: 14 NG/ML (ref 31–100)
ALBUMIN SERPL-MCNC: 4.5 G/DL (ref 3.5–5)
ALP BLD-CCNC: 73 U/L (ref 35–125)
ALT SERPL-CCNC: 28 U/L (ref 5–40)
ANION GAP SERPL CALC-SCNC: 13 MMOL/L
APPEARANCE UR: CLEAR
AST SERPL-CCNC: 31 U/L (ref 5–40)
BASOPHILS # BLD AUTO: 0.09 X10*3/UL (ref 0–0.1)
BASOPHILS NFR BLD AUTO: 0.8 %
BILIRUB SERPL-MCNC: 0.6 MG/DL (ref 0.1–1.2)
BILIRUB UR STRIP.AUTO-MCNC: NEGATIVE MG/DL
BUN SERPL-MCNC: 10 MG/DL (ref 8–25)
CALCIUM SERPL-MCNC: 9.8 MG/DL (ref 8.5–10.4)
CHLORIDE SERPL-SCNC: 103 MMOL/L (ref 97–107)
CHOLEST SERPL-MCNC: 146 MG/DL (ref 133–200)
CHOLEST/HDLC SERPL: 3.7 {RATIO}
CO2 SERPL-SCNC: 24 MMOL/L (ref 24–31)
COLOR UR: YELLOW
CREAT SERPL-MCNC: 0.7 MG/DL (ref 0.4–1.6)
EGFRCR SERPLBLD CKD-EPI 2021: >90 ML/MIN/1.73M*2
EOSINOPHIL # BLD AUTO: 0.29 X10*3/UL (ref 0–0.7)
EOSINOPHIL NFR BLD AUTO: 2.7 %
ERYTHROCYTE [DISTWIDTH] IN BLOOD BY AUTOMATED COUNT: 12.1 % (ref 11.5–14.5)
EST. AVERAGE GLUCOSE BLD GHB EST-MCNC: 123 MG/DL
GLUCOSE SERPL-MCNC: 102 MG/DL (ref 65–99)
GLUCOSE UR STRIP.AUTO-MCNC: NORMAL MG/DL
HBA1C MFR BLD: 5.9 %
HCT VFR BLD AUTO: 47.8 % (ref 36–46)
HDLC SERPL-MCNC: 40 MG/DL
HGB BLD-MCNC: 15.4 G/DL (ref 12–16)
HOLD SPECIMEN: NORMAL
IMM GRANULOCYTES # BLD AUTO: 0.1 X10*3/UL (ref 0–0.7)
IMM GRANULOCYTES NFR BLD AUTO: 0.9 % (ref 0–0.9)
KETONES UR STRIP.AUTO-MCNC: NEGATIVE MG/DL
LDLC SERPL CALC-MCNC: 78 MG/DL (ref 65–130)
LEUKOCYTE ESTERASE UR QL STRIP.AUTO: NEGATIVE
LYMPHOCYTES # BLD AUTO: 2.93 X10*3/UL (ref 1.2–4.8)
LYMPHOCYTES NFR BLD AUTO: 26.8 %
MCH RBC QN AUTO: 32 PG (ref 26–34)
MCHC RBC AUTO-ENTMCNC: 32.2 G/DL (ref 32–36)
MCV RBC AUTO: 99 FL (ref 80–100)
MONOCYTES # BLD AUTO: 0.79 X10*3/UL (ref 0.1–1)
MONOCYTES NFR BLD AUTO: 7.2 %
NEUTROPHILS # BLD AUTO: 6.73 X10*3/UL (ref 1.2–7.7)
NEUTROPHILS NFR BLD AUTO: 61.6 %
NITRITE UR QL STRIP.AUTO: NEGATIVE
NRBC BLD-RTO: 0 /100 WBCS (ref 0–0)
PH UR STRIP.AUTO: 5 [PH]
PLATELET # BLD AUTO: 302 X10*3/UL (ref 150–450)
POTASSIUM SERPL-SCNC: 4.5 MMOL/L (ref 3.4–5.1)
PROT SERPL-MCNC: 7.5 G/DL (ref 5.9–7.9)
PROT UR STRIP.AUTO-MCNC: NEGATIVE MG/DL
RBC # BLD AUTO: 4.81 X10*6/UL (ref 4–5.2)
RBC # UR STRIP.AUTO: NEGATIVE /UL
SODIUM SERPL-SCNC: 140 MMOL/L (ref 133–145)
SP GR UR STRIP.AUTO: 1.02
TRIGL SERPL-MCNC: 142 MG/DL (ref 40–150)
TSH SERPL DL<=0.05 MIU/L-ACNC: 1.37 MIU/L (ref 0.27–4.2)
UROBILINOGEN UR STRIP.AUTO-MCNC: NORMAL MG/DL
WBC # BLD AUTO: 10.9 X10*3/UL (ref 4.4–11.3)

## 2024-08-12 PROCEDURE — 84443 ASSAY THYROID STIM HORMONE: CPT

## 2024-08-12 PROCEDURE — 82306 VITAMIN D 25 HYDROXY: CPT

## 2024-08-12 PROCEDURE — 99214 OFFICE O/P EST MOD 30 MIN: CPT | Performed by: NURSE PRACTITIONER

## 2024-08-12 PROCEDURE — 93005 ELECTROCARDIOGRAM TRACING: CPT

## 2024-08-12 PROCEDURE — 80053 COMPREHEN METABOLIC PANEL: CPT

## 2024-08-12 PROCEDURE — 85025 COMPLETE CBC W/AUTO DIFF WBC: CPT

## 2024-08-12 PROCEDURE — 81003 URINALYSIS AUTO W/O SCOPE: CPT

## 2024-08-12 PROCEDURE — 93010 ELECTROCARDIOGRAM REPORT: CPT | Performed by: INTERNAL MEDICINE

## 2024-08-12 PROCEDURE — 36415 COLL VENOUS BLD VENIPUNCTURE: CPT

## 2024-08-12 PROCEDURE — 80061 LIPID PANEL: CPT

## 2024-08-12 PROCEDURE — 83036 HEMOGLOBIN GLYCOSYLATED A1C: CPT

## 2024-08-12 RX ORDER — ACETAMINOPHEN 500 MG
1000 TABLET ORAL EVERY 6 HOURS PRN
COMMUNITY

## 2024-08-12 RX ORDER — FLUTICASONE PROPIONATE AND SALMETEROL 500; 50 UG/1; UG/1
1 POWDER RESPIRATORY (INHALATION)
COMMUNITY
Start: 2024-04-28

## 2024-08-12 RX ORDER — ERGOCALCIFEROL 1.25 MG/1
50000 CAPSULE ORAL
Qty: 12 CAPSULE | Refills: 0 | Status: SHIPPED | OUTPATIENT
Start: 2024-08-18 | End: 2024-11-10

## 2024-08-12 ASSESSMENT — ENCOUNTER SYMPTOMS
NEUROLOGICAL NEGATIVE: 1
EYES NEGATIVE: 1
MUSCULOSKELETAL NEGATIVE: 1
NECK NEGATIVE: 1
CARDIOVASCULAR NEGATIVE: 1
ENDOCRINE NEGATIVE: 1
RESPIRATORY NEGATIVE: 1
CONSTITUTIONAL NEGATIVE: 1
GASTROINTESTINAL NEGATIVE: 1

## 2024-08-12 ASSESSMENT — DUKE ACTIVITY SCORE INDEX (DASI)
CAN YOU WALK A BLOCK OR TWO ON LEVEL GROUND: YES
CAN YOU DO MODERATE WORK AROUND THE HOUSE LIKE VACUUMING, SWEEPING FLOORS OR CARRYING GROCERIES: YES
CAN YOU WALK INDOORS, SUCH AS AROUND YOUR HOUSE: YES
DASI METS SCORE: 9.9
CAN YOU PARTICIPATE IN STRENOUS SPORTS LIKE SWIMMING, SINGLES TENNIS, FOOTBALL, BASKETBALL, OR SKIING: YES
CAN YOU DO LIGHT WORK AROUND THE HOUSE LIKE DUSTING OR WASHING DISHES: YES
CAN YOU CLIMB A FLIGHT OF STAIRS OR WALK UP A HILL: YES
CAN YOU PARTICIPATE IN MODERATE RECREATIONAL ACTIVITIES LIKE GOLF, BOWLING, DANCING, DOUBLES TENNIS OR THROWING A BASEBALL OR FOOTBALL: YES
TOTAL_SCORE: 58.2
CAN YOU TAKE CARE OF YOURSELF (EAT, DRESS, BATHE, OR USE TOILET): YES
CAN YOU RUN A SHORT DISTANCE: YES
CAN YOU HAVE SEXUAL RELATIONS: YES
CAN YOU DO YARD WORK LIKE RAKING LEAVES, WEEDING OR PUSHING A MOWER: YES
CAN YOU DO HEAVY WORK AROUND THE HOUSE LIKE SCRUBBING FLOORS OR LIFTING AND MOVING HEAVY FURNITURE: YES

## 2024-08-12 ASSESSMENT — PAIN SCALES - GENERAL: PAINLEVEL_OUTOF10: 0 - NO PAIN

## 2024-08-12 ASSESSMENT — PAIN - FUNCTIONAL ASSESSMENT: PAIN_FUNCTIONAL_ASSESSMENT: 0-10

## 2024-08-12 NOTE — LETTER
August 12, 2024     Clara Rashid  [address] [facility] [address]      Dear Ms. Rashid:    Below are the results from your recent visit:    Resulted Orders   TSH with reflex to Free T4 if abnormal   Result Value Ref Range    Thyroid Stimulating Hormone 1.37 0.27 - 4.20 mIU/L    Narrative    TSH testing is performed using different testing methodology at [facility] than at other [facility] hospitals. Direct result comparisons should only be made within the same method.     Comprehensive Metabolic Panel   Result Value Ref Range    Glucose 102 (H) 65 - 99 mg/dL    Sodium 140 133 - 145 mmol/L    Potassium 4.5 3.4 - 5.1 mmol/L    Chloride 103 97 - 107 mmol/L    Bicarbonate 24 24 - 31 mmol/L    Urea Nitrogen 10 8 - 25 mg/dL    Creatinine 0.70 0.40 - 1.60 mg/dL    eGFR >90 >60 mL/min/1.73m*2      Comment:      Calculations of estimated GFR are performed using the 2021 CKD-EPI Study Refit equation without the race variable for the IDMS-Traceable creatinine methods.  https://jasn.asnjournals.org/content/early/2021/09/22/ASN.6367487545    Calcium 9.8 8.5 - 10.4 mg/dL    Albumin 4.5 3.5 - 5.0 g/dL    Alkaline Phosphatase 73 35 - 125 U/L    Total Protein 7.5 5.9 - 7.9 g/dL    AST 31 5 - 40 U/L    Bilirubin, Total 0.6 0.1 - 1.2 mg/dL    ALT 28 5 - 40 U/L    Anion Gap 13 <=19 mmol/L   CBC and Auto Differential   Result Value Ref Range    WBC 10.9 4.4 - 11.3 x10*3/uL    nRBC 0.0 0.0 - 0.0 /100 WBCs    RBC 4.81 4.00 - 5.20 x10*6/uL    Hemoglobin 15.4 12.0 - 16.0 g/dL    Hematocrit 47.8 (H) 36.0 - 46.0 %    MCV 99 80 - 100 fL    MCH 32.0 26.0 - 34.0 pg    MCHC 32.2 32.0 - 36.0 g/dL    RDW 12.1 11.5 - 14.5 %    Platelets 302 150 - 450 x10*3/uL    Neutrophils % 61.6 40.0 - 80.0 %    Immature Granulocytes %, Automated 0.9 0.0 - 0.9 %      Comment:      Immature Granulocyte Count (IG) includes promyelocytes, myelocytes and metamyelocytes but does not include bands. Percent differential counts (%) should be  "interpreted in the context of the absolute cell counts (cells/UL).    Lymphocytes % 26.8 13.0 - 44.0 %    Monocytes % 7.2 2.0 - 10.0 %    Eosinophils % 2.7 0.0 - 6.0 %    Basophils % 0.8 0.0 - 2.0 %    Neutrophils Absolute 6.73 1.20 - 7.70 x10*3/uL      Comment:      Percent differential counts (%) should be interpreted in the context of the absolute cell counts (cells/uL).    Immature Granulocytes Absolute, Automated 0.10 0.00 - 0.70 x10*3/uL    Lymphocytes Absolute 2.93 1.20 - 4.80 x10*3/uL    Monocytes Absolute 0.79 0.10 - 1.00 x10*3/uL    Eosinophils Absolute 0.29 0.00 - 0.70 x10*3/uL    Basophils Absolute 0.09 0.00 - 0.10 x10*3/uL   Hemoglobin A1C   Result Value Ref Range    Hemoglobin A1C 5.9 (H) See below %    Estimated Average Glucose 123 Not Established mg/dL    Narrative    Diagnosis of Diabetes-Adults  Non-Diabetic: < or = 5.6%  Increased risk for developing diabetes: 5.7-6.4%  Diagnostic of diabetes: > or = 6.5%       Lipid Panel   Result Value Ref Range    Cholesterol 146 133 - 200 mg/dL    HDL-Cholesterol 40.0 (L) >50.0 mg/dL      Comment:      National Cholesterol Education Program (NCFP) guidelines:  <40 mg/dL: Low HDL-cholesterol (major risk factor for CHD)  >60 mg/dL: High HDL-cholesterol (\"negative\"risk factor for CHD)  HDL-cholesterol is affected by a number of factors (e.g., smoking, exercise, hormones, sex and age).      Cholesterol/HDL Ratio 3.7 SEE COMMENT      Comment:      According to the American Heart Association, the goal is to maintain the total Cholesterol/HDL ratio at 5-to-1, or lower, with an optimum ratio of 3.5-to-1.    LDL Calculated 78 65 - 130 mg/dL    Triglycerides 142 40 - 150 mg/dL     The test results show that A1C is down to 5.9. All other labs look good.    If you have any questions or concerns, please don't hesitate to call.         Sincerely,        Gladys Fierro, CNP  "

## 2024-08-12 NOTE — PREPROCEDURE INSTRUCTIONS
Medication List            Accurate as of August 12, 2024  7:31 AM. Always use your most recent med list.                acetaminophen 500 mg tablet  Commonly known as: Tylenol  Medication Adjustments for Surgery: Other (Comment)  Notes to patient: CAN TAKE THE MORNING OF SURGERY IF NEEDED     albuterol 90 mcg/actuation inhaler  Commonly known as: Proventil HFA  Inhale 2 puffs every 4 hours if needed for wheezing or shortness of breath.  Medication Adjustments for Surgery: Other (Comment)  Notes to patient: CAN USE THE MORNING OF SURGERY IF NEEDED - BRING WITH YOU DAY OF SURGERY     cephalexin 250 mg capsule  Commonly known as: Keflex  Medication Adjustments for Surgery: Other (Comment)  Notes to patient: CAN TAKE IF NEEDED - CALL OFFICE WITH SYMPTOMS     dulaglutide 3 mg/0.5 mL pen injector  Inject 3 mg under the skin 1 (one) time per week.  Medication Adjustments for Surgery: Stop 7 days before surgery  Notes to patient: LAST DOSE 8/7/24 - UNTIL AFTER SURGERY     lisinopril 5 mg tablet  Take 1 tablet (5 mg) by mouth once daily.  Medication Adjustments for Surgery: Other (Comment)  Notes to patient: HOLD THE MORNING OF SURGERY     METAMUCIL ORAL  Medication Adjustments for Surgery: Other (Comment)  Notes to patient: HOLD THE MORNING OF SURGERY     metFORMIN  mg 24 hr tablet  Commonly known as: Glucophage-XR  Take 1 tablet (500 mg) by mouth 2 times a day.  Notes to patient:  HOLD THE MORNING OF SURGERY     rosuvastatin 5 mg tablet  Commonly known as: Crestor  Take 1 tablet (5 mg) by mouth once daily.  Medication Adjustments for Surgery: Take morning of surgery with sip of water, no other fluids     Wixela Inhub 500-50 mcg/dose diskus inhaler  Generic drug: fluticasone propion-salmeteroL  Medication Adjustments for Surgery: Take morning of surgery with sip of water, no other fluids                 Preoperative Fasting Guidelines    Why must I stop eating and drinking near surgery time?  With sedation, food  or liquid in your stomach can enter your lungs causing serious complications  Increases nausea and vomiting    When do I need to stop eating and drinking before my surgery?  Do not eat any food or drink any liquids after midnight the night before your surgery/procedure.  You may have sips of water to take medications.    PAT DISCHARGE INSTRUCTIONS    Please call the Same Day Surgery (SDS) Department of the hospital where your procedure will be performed after 2:00 PM the day before your surgery. If you are scheduled on a Monday, or a Tuesday following a Monday holiday, you will need to call on the last business day prior to your surgery.    [facility]  [address], [address] [phone]  [facility]  [address] [place] [address] [phone]  [facility]  [address]  [place] [id number] [phone]    Please let your surgeon know if:      You develop any open sores, shingles, burning or painful urination as these may increase your risk of an infection.   You no longer wish to have the surgery.   Any other personal circumstances change that may lead to the need to cancel or defer this surgery-such as being sick or getting admitted to any hospital within one week of your planned procedure.    Your contact details change, such as a change of address or phone number.    Starting now:     Please DO NOT drink alcohol or smoke for 24 hours before surgery. It is well known that quitting smoking can make a huge difference to your health and recovery from surgery. The longer you abstain from smoking, the better your chances of a healthy recovery. If you need help with quitting, call 7-800-QUIT-NOW to be connected to a trained counselor who will discuss the best methods to help you quit.     Before your surgery:    Please stop all supplements 7 days prior  to surgery. Or as directed by your surgeon.   Please stop taking NSAID pain medicine such as Advil and Motrin 7 days before surgery.    If you develop any fever, cough, cold, rashes, cuts, scratches, scrapes, urinary symptoms or infection anywhere on your body (including teeth and gums) prior to surgery, please call your surgeon’s office as soon as possible. This may require treatment to reduce the chance of cancellation on the day of surgery.    The day before your surgery:   DIET- Please follow the diet instructions at the top of your packet.   Get a good night’s rest.  Use the special soap for bathing if you have been instructed to use one.    Scheduled surgery times may change and you will be notified if this occurs - please check your personal voicemail for any updates.     On the morning of surgery:   Wear comfortable, loose fitting clothes which open in the front. Please do not wear moisturizers, creams, lotions, makeup or perfume.    Please bring with you to surgery:   Photo ID and insurance card   Current list of medicines and allergies   Pacemaker/ Defibrillator/Heart stent cards   CPAP machine and mask    Slings/ splints/ crutches   A copy of your complete advanced directive/DHPOA.    Please do NOT bring with you to surgery:   All jewelry and valuables should be left at home.   Prosthetic devices such as contact lenses, hearing aids, dentures, eyelash extensions, hairpins and body piercings must be removed prior to going in to the surgical suite.    After outpatient surgery:   A responsible adult MUST accompany you at the time of discharge and stay with you for 24 hours after your surgery. You may NOT drive yourself home after surgery.    Do not drive, operate machinery, make critical decisions or do activities that require co-ordination or balance until after a night’s sleep.   Do not drink alcoholic beverages for 24 hours.   Instructions for resuming your medications will be provided by your  surgeon.    CALL YOUR DOCTOR AFTER SURGERY IF YOU HAVE:     Chills and/or a fever of 101° F or higher.    Redness, swelling, pus or drainage from your surgical wound or a bad smell from the wound.    Lightheadedness, fainting or confusion.    Persistent vomiting (throwing up) and are not able to eat or drink for 12 hours.    Three or more loose, watery bowel movements in 24 hours (diarrhea).   Difficulty or pain while urinating( after non-urological surgery)    Pain and swelling in your legs, especially if it is only on one side.    Difficulty breathing or are breathing faster than normal.    Any new concerning symptoms.

## 2024-08-12 NOTE — CPM/PAT H&P
CPM/PAT Evaluation       Name: Clara Rashid (Clara Rashid)  /Age: 1960/63 y.o.        Chief Complaint: Pre-admission testing    HPI  This is a 64y/o female with PMHX significant for DM2, HTN, HLD, gross hematuria, and bladder cancer. Patient stated that she was diagnosed with bladder cancer in .  Patient had a cystoscopy with Dr. Mcdonald (22) which showed a large tumor on the R lateral wall, and a small tumor on the L trigone. CT Urogram (22) with two synchronous urinary bladder masses, larger mass involves/surrounds right UVJ without obstructing it, smaller mass near/involving portion of left UVJ without obstructing it, a few nonspecific <6 mm solid indeterminate bibasilar lung nodules. S/p TURBT large (3/8/22) with pathology showing papillary urothelial carcinoma, low grade with focal high grade cytologic features, focal area suspicious for lamina propria invasion. S/p TURBT medium (22) with pathology showing partially denuded urothelial mucosa with chronic inflammation and proliferative changes consistent with previous procedure, muscularis propria, smooth muscle and adipose tissue negative for carcinoma. Completed BCG. S/p TURBT small (22) with pathology showing focal high-grade and predominantly low-grade papillary urothelial carcinoma, no definitive lamina propria invasion, muscularis propria uninvolved by carcinoma. CT Urogram (23) showed no enhancing mass or filling defect in the bilateral ureters or bladder to suggest residual/recurrent disease and no evidence of distant disease. CT chest (23) showed few indeterminate less than 6 mm pulmonary nodules, otherwise, no suspicious pulmonary nodule or thoracic adenopathy. Presents for cystoscopy. Patient reported hematuria recently and had follow up cystoscopy 24 which revealed papillary lesion along posterior wall, multiple erythematous areas, likely inflammation vs potential malignancy and is scheduled for  cystoscopy with excision of tissue with Dr. Mendoza on 24. Patient denied fever, chills, chest pain, shortness of breath, abdominal pain, nausea, vomiting, diarrhea or dysuria at the time of exam.  She denied history of DVT, PE, CVA, or MI.  She does have +left carotid bruit on exam.  Last carotid ultrasound 3/4/2022 revealed laque formation right carotid bifurcation with less than 50% ICA stenosis. Plaque formation left carotid bifurcation with less than 50% ICA stenosis.    Patient is scheduled for cystoscopy with excision of tissue on 24 with Dr. Mendoza    Past Medical History:   Diagnosis Date    Bladder cancer (Multi)     Hx antineoplastic chemo     Personal history of other diseases of the respiratory system 2013    History of acute bronchitis    Personal history of other diseases of the respiratory system 2013    Personal history of asthma       No past surgical history on file.    Patient  has no history on file for sexual activity.    Social History     Tobacco Use    Smoking status: Former     Current packs/day: 0.00     Average packs/day: 1.5 packs/day for 42.5 years (63.8 ttl pk-yrs)     Types: Cigarettes     Start date: 1979     Quit date: 2022     Years since quittin.4     Passive exposure: Past    Smokeless tobacco: Never   Vaping Use    Vaping status: Never Used   Substance Use Topics    Alcohol use: Yes     Alcohol/week: 2.0 standard drinks of alcohol     Types: 2 Glasses of wine per week     Comment: OCCASIONAL    Drug use: Yes     Types: Marijuana     Comment: SMOKES DAILY        Family History   Problem Relation Name Age of Onset    Parkinsonism Mother      Hypertension Father      Stroke Father      Diabetes Father      Heart disease Father         Allergies   Allergen Reactions    Bactrim [Sulfamethoxazole-Trimethoprim] Hives and Itching       Prior to Admission medications    Medication Sig Start Date End Date Taking? Authorizing Provider   acetaminophen  (Tylenol) 500 mg tablet Take 2 tablets (1,000 mg) by mouth every 6 hours if needed for mild pain (1 - 3).   Yes Historical Provider, MD   albuterol (Proventil HFA) 90 mcg/actuation inhaler Inhale 2 puffs every 4 hours if needed for wheezing or shortness of breath. 8/2/24 8/2/25 Yes RAFFAELE Cho   lisinopril 5 mg tablet Take 1 tablet (5 mg) by mouth once daily. 5/2/24 10/29/24 Yes RAFFAELE Cho   metFORMIN  mg 24 hr tablet Take 1 tablet (500 mg) by mouth 2 times a day. 5/2/24 10/29/24 Yes RAFFAELE Cho   psyllium husk (METAMUCIL ORAL) Take by mouth.   Yes Historical Provider, MD   rosuvastatin (Crestor) 5 mg tablet Take 1 tablet (5 mg) by mouth once daily. 5/2/24 10/29/24 Yes RAFFAELE Cho   Wixela Inhub 500-50 mcg/dose diskus inhaler Inhale 1 puff 2 times a day. 4/28/24  Yes Historical Provider, MD   cephalexin (Keflex) 250 mg capsule Take 1 capsule (250 mg) by mouth 1 time.    Historical Provider, MD   dulaglutide 3 mg/0.5 mL pen injector Inject 3 mg under the skin 1 (one) time per week. 8/4/24 11/2/24  RAFFAELE Cho          PAT ROS:   Constitutional:   neg    Neuro/Psych:   neg    Eyes:   neg    Ears:   neg    Nose:   neg    Mouth:   neg    Throat:   neg    Neck:    +left carotid bruit  neg    Cardio:   neg    Respiratory:   neg    Endocrine:   neg    GI:   neg    :   neg    Musculoskeletal:   neg    Hematologic:   neg    Skin:  neg        Physical Exam  Constitutional:       Appearance: Normal appearance.   HENT:      Head: Normocephalic and atraumatic.      Nose: Nose normal.      Mouth/Throat:      Mouth: Mucous membranes are moist.      Pharynx: Oropharynx is clear.   Eyes:      Extraocular Movements: Extraocular movements intact.      Conjunctiva/sclera: Conjunctivae normal.      Pupils: Pupils are equal, round, and reactive to light.   Neck:      Vascular: Carotid bruit present.      Comments: +left carotid bruit  Cardiovascular:  "     Rate and Rhythm: Normal rate and regular rhythm.   Pulmonary:      Effort: Pulmonary effort is normal.      Breath sounds: Normal breath sounds.   Abdominal:      General: Abdomen is flat. Bowel sounds are normal.      Palpations: Abdomen is soft.   Musculoskeletal:         General: Normal range of motion.      Cervical back: Normal range of motion.   Skin:     General: Skin is warm and dry.      Capillary Refill: Capillary refill takes less than 2 seconds.   Neurological:      General: No focal deficit present.      Mental Status: She is alert and oriented to person, place, and time.   Psychiatric:         Mood and Affect: Mood normal.         Behavior: Behavior normal.        /76   Pulse 86   Temp 37 °C (98.6 °F) (Temporal)   Ht 1.6 m (5' 3\")   Wt 76.8 kg (169 lb 6.4 oz)   SpO2 97%   BMI 30.01 kg/m²     PAT AIRWAY:   Airway:     Mallampati::  III    TM distance::  >3 FB    Neck ROM::  Full    ASA: II  DASI RISK SCORE: 58.2  METS SCORE: 9.9  CHADS2 SCORE: 4.0%  REVISED CARDIAC RISK INDEX: 0.4%  STOP BANG SCORE: 2  ARISCAT: 1.6%      Assessment and Plan:   Bladder tumors with history of bladder cancer  Hematuria  -Cystoscopy 7/18/24 which revealed papillary lesion along posterior wall, multiple erythematous areas, likely inflammation vs potential malignancy and is scheduled for cystoscopy with excision of tissue with Dr. Mnedoza on 8/20/24  -on Keflex 250mg prn for UTI sx  -U/A ordered    HTN  -stable on Lisinopril - holding prior to procedure 8/20/24    DM2  -HgA1c 6.2  -instructed to hold trulicity  (last dose 8/7/24)and metformin prior to procedure    HLD  -resume PTA Crestor    COPD  Pulmonary nodules  -ex-smoker - quit 2/2022  -resume PTA albuterol prn    Left carotid bruit  -Last carotid ultrasound 3/4/2022 revealed laque formation right carotid bifurcation with less than 50% ICA stenosis. Plaque formation left carotid bifurcation with less than 50% ICA stenosis.    EKG on review NSR with no " acute findingss    CBC, CMP, EKG and U/A ordered

## 2024-08-15 ENCOUNTER — APPOINTMENT (OUTPATIENT)
Dept: PREADMISSION TESTING | Facility: HOSPITAL | Age: 64
End: 2024-08-15
Payer: COMMERCIAL

## 2024-08-15 LAB
ATRIAL RATE: 79 BPM
P AXIS: 73 DEGREES
P OFFSET: 193 MS
P ONSET: 140 MS
PR INTERVAL: 160 MS
Q ONSET: 220 MS
QRS COUNT: 13 BEATS
QRS DURATION: 84 MS
QT INTERVAL: 378 MS
QTC CALCULATION(BAZETT): 433 MS
QTC FREDERICIA: 414 MS
R AXIS: 32 DEGREES
T AXIS: 52 DEGREES
T OFFSET: 409 MS
VENTRICULAR RATE: 79 BPM

## 2024-08-20 ENCOUNTER — HOSPITAL ENCOUNTER (OUTPATIENT)
Facility: HOSPITAL | Age: 64
Setting detail: OUTPATIENT SURGERY
Discharge: HOME | End: 2024-08-20
Attending: STUDENT IN AN ORGANIZED HEALTH CARE EDUCATION/TRAINING PROGRAM | Admitting: STUDENT IN AN ORGANIZED HEALTH CARE EDUCATION/TRAINING PROGRAM
Payer: COMMERCIAL

## 2024-08-20 ENCOUNTER — APPOINTMENT (OUTPATIENT)
Dept: RADIOLOGY | Facility: HOSPITAL | Age: 64
End: 2024-08-20
Payer: COMMERCIAL

## 2024-08-20 ENCOUNTER — ANESTHESIA EVENT (OUTPATIENT)
Dept: OPERATING ROOM | Facility: HOSPITAL | Age: 64
End: 2024-08-20
Payer: COMMERCIAL

## 2024-08-20 ENCOUNTER — ANESTHESIA (OUTPATIENT)
Dept: OPERATING ROOM | Facility: HOSPITAL | Age: 64
End: 2024-08-20
Payer: COMMERCIAL

## 2024-08-20 VITALS
WEIGHT: 168.43 LBS | SYSTOLIC BLOOD PRESSURE: 128 MMHG | HEART RATE: 88 BPM | OXYGEN SATURATION: 95 % | TEMPERATURE: 98.2 F | BODY MASS INDEX: 29.84 KG/M2 | HEIGHT: 63 IN | DIASTOLIC BLOOD PRESSURE: 72 MMHG | RESPIRATION RATE: 18 BRPM

## 2024-08-20 DIAGNOSIS — N32.89 BLADDER MASS: ICD-10-CM

## 2024-08-20 LAB — GLUCOSE BLD MANUAL STRIP-MCNC: 112 MG/DL (ref 74–99)

## 2024-08-20 PROCEDURE — 74430 CONTRAST X-RAY BLADDER: CPT | Performed by: STUDENT IN AN ORGANIZED HEALTH CARE EDUCATION/TRAINING PROGRAM

## 2024-08-20 PROCEDURE — 7100000009 HC PHASE TWO TIME - INITIAL BASE CHARGE: Performed by: STUDENT IN AN ORGANIZED HEALTH CARE EDUCATION/TRAINING PROGRAM

## 2024-08-20 PROCEDURE — 2500000004 HC RX 250 GENERAL PHARMACY W/ HCPCS (ALT 636 FOR OP/ED): Performed by: NURSE ANESTHETIST, CERTIFIED REGISTERED

## 2024-08-20 PROCEDURE — 76377 3D RENDER W/INTRP POSTPROCES: CPT

## 2024-08-20 PROCEDURE — 2500000004 HC RX 250 GENERAL PHARMACY W/ HCPCS (ALT 636 FOR OP/ED): Performed by: STUDENT IN AN ORGANIZED HEALTH CARE EDUCATION/TRAINING PROGRAM

## 2024-08-20 PROCEDURE — 82947 ASSAY GLUCOSE BLOOD QUANT: CPT

## 2024-08-20 PROCEDURE — 71260 CT THORAX DX C+: CPT

## 2024-08-20 PROCEDURE — 3700000002 HC GENERAL ANESTHESIA TIME - EACH INCREMENTAL 1 MINUTE: Performed by: STUDENT IN AN ORGANIZED HEALTH CARE EDUCATION/TRAINING PROGRAM

## 2024-08-20 PROCEDURE — 52234 CYSTOSCOPY AND TREATMENT: CPT | Performed by: STUDENT IN AN ORGANIZED HEALTH CARE EDUCATION/TRAINING PROGRAM

## 2024-08-20 PROCEDURE — 7100000010 HC PHASE TWO TIME - EACH INCREMENTAL 1 MINUTE: Performed by: STUDENT IN AN ORGANIZED HEALTH CARE EDUCATION/TRAINING PROGRAM

## 2024-08-20 PROCEDURE — 3600000003 HC OR TIME - INITIAL BASE CHARGE - PROCEDURE LEVEL THREE: Performed by: STUDENT IN AN ORGANIZED HEALTH CARE EDUCATION/TRAINING PROGRAM

## 2024-08-20 PROCEDURE — 2720000007 HC OR 272 NO HCPCS: Performed by: STUDENT IN AN ORGANIZED HEALTH CARE EDUCATION/TRAINING PROGRAM

## 2024-08-20 PROCEDURE — 7100000001 HC RECOVERY ROOM TIME - INITIAL BASE CHARGE: Performed by: STUDENT IN AN ORGANIZED HEALTH CARE EDUCATION/TRAINING PROGRAM

## 2024-08-20 PROCEDURE — 7100000002 HC RECOVERY ROOM TIME - EACH INCREMENTAL 1 MINUTE: Performed by: STUDENT IN AN ORGANIZED HEALTH CARE EDUCATION/TRAINING PROGRAM

## 2024-08-20 PROCEDURE — 74420 UROGRAPHY RTRGR +-KUB: CPT

## 2024-08-20 PROCEDURE — 3700000001 HC GENERAL ANESTHESIA TIME - INITIAL BASE CHARGE: Performed by: STUDENT IN AN ORGANIZED HEALTH CARE EDUCATION/TRAINING PROGRAM

## 2024-08-20 PROCEDURE — 88305 TISSUE EXAM BY PATHOLOGIST: CPT | Mod: TC,SUR,BEALAB,WESLAB | Performed by: STUDENT IN AN ORGANIZED HEALTH CARE EDUCATION/TRAINING PROGRAM

## 2024-08-20 PROCEDURE — 3600000008 HC OR TIME - EACH INCREMENTAL 1 MINUTE - PROCEDURE LEVEL THREE: Performed by: STUDENT IN AN ORGANIZED HEALTH CARE EDUCATION/TRAINING PROGRAM

## 2024-08-20 PROCEDURE — 2550000001 HC RX 255 CONTRASTS: Performed by: STUDENT IN AN ORGANIZED HEALTH CARE EDUCATION/TRAINING PROGRAM

## 2024-08-20 RX ORDER — DIPHENHYDRAMINE HYDROCHLORIDE 50 MG/ML
12.5 INJECTION INTRAMUSCULAR; INTRAVENOUS ONCE AS NEEDED
Status: DISCONTINUED | OUTPATIENT
Start: 2024-08-20 | End: 2024-08-20 | Stop reason: HOSPADM

## 2024-08-20 RX ORDER — CEFAZOLIN SODIUM 2 G/100ML
2 INJECTION, SOLUTION INTRAVENOUS
Status: COMPLETED | OUTPATIENT
Start: 2024-08-20 | End: 2024-08-20

## 2024-08-20 RX ORDER — HYDRALAZINE HYDROCHLORIDE 20 MG/ML
5 INJECTION INTRAMUSCULAR; INTRAVENOUS EVERY 30 MIN PRN
Status: DISCONTINUED | OUTPATIENT
Start: 2024-08-20 | End: 2024-08-20 | Stop reason: HOSPADM

## 2024-08-20 RX ORDER — LABETALOL HYDROCHLORIDE 5 MG/ML
5 INJECTION, SOLUTION INTRAVENOUS ONCE AS NEEDED
Status: DISCONTINUED | OUTPATIENT
Start: 2024-08-20 | End: 2024-08-20 | Stop reason: HOSPADM

## 2024-08-20 RX ORDER — FENTANYL CITRATE 50 UG/ML
50 INJECTION, SOLUTION INTRAMUSCULAR; INTRAVENOUS EVERY 5 MIN PRN
Status: DISCONTINUED | OUTPATIENT
Start: 2024-08-20 | End: 2024-08-20 | Stop reason: HOSPADM

## 2024-08-20 RX ORDER — KETOROLAC TROMETHAMINE 30 MG/ML
INJECTION, SOLUTION INTRAMUSCULAR; INTRAVENOUS AS NEEDED
Status: DISCONTINUED | OUTPATIENT
Start: 2024-08-20 | End: 2024-08-20

## 2024-08-20 RX ORDER — SODIUM CHLORIDE, SODIUM LACTATE, POTASSIUM CHLORIDE, CALCIUM CHLORIDE 600; 310; 30; 20 MG/100ML; MG/100ML; MG/100ML; MG/100ML
100 INJECTION, SOLUTION INTRAVENOUS CONTINUOUS
Status: DISCONTINUED | OUTPATIENT
Start: 2024-08-20 | End: 2024-08-20 | Stop reason: HOSPADM

## 2024-08-20 RX ORDER — ONDANSETRON HYDROCHLORIDE 2 MG/ML
4 INJECTION, SOLUTION INTRAVENOUS ONCE AS NEEDED
Status: DISCONTINUED | OUTPATIENT
Start: 2024-08-20 | End: 2024-08-20 | Stop reason: HOSPADM

## 2024-08-20 RX ORDER — FENTANYL CITRATE 50 UG/ML
INJECTION, SOLUTION INTRAMUSCULAR; INTRAVENOUS AS NEEDED
Status: DISCONTINUED | OUTPATIENT
Start: 2024-08-20 | End: 2024-08-20

## 2024-08-20 RX ORDER — IPRATROPIUM BROMIDE 0.5 MG/2.5ML
500 SOLUTION RESPIRATORY (INHALATION) AS NEEDED
Status: DISCONTINUED | OUTPATIENT
Start: 2024-08-20 | End: 2024-08-20 | Stop reason: HOSPADM

## 2024-08-20 RX ORDER — MEPERIDINE HYDROCHLORIDE 25 MG/ML
12.5 INJECTION INTRAMUSCULAR; INTRAVENOUS; SUBCUTANEOUS EVERY 10 MIN PRN
Status: DISCONTINUED | OUTPATIENT
Start: 2024-08-20 | End: 2024-08-20 | Stop reason: HOSPADM

## 2024-08-20 RX ORDER — ALBUTEROL SULFATE 0.83 MG/ML
2.5 SOLUTION RESPIRATORY (INHALATION) ONCE AS NEEDED
Status: DISCONTINUED | OUTPATIENT
Start: 2024-08-20 | End: 2024-08-20 | Stop reason: HOSPADM

## 2024-08-20 RX ORDER — PROPOFOL 10 MG/ML
INJECTION, EMULSION INTRAVENOUS AS NEEDED
Status: DISCONTINUED | OUTPATIENT
Start: 2024-08-20 | End: 2024-08-20

## 2024-08-20 RX ORDER — PROCHLORPERAZINE EDISYLATE 5 MG/ML
5 INJECTION INTRAMUSCULAR; INTRAVENOUS ONCE AS NEEDED
Status: DISCONTINUED | OUTPATIENT
Start: 2024-08-20 | End: 2024-08-20 | Stop reason: HOSPADM

## 2024-08-20 RX ORDER — ONDANSETRON HYDROCHLORIDE 2 MG/ML
INJECTION, SOLUTION INTRAVENOUS AS NEEDED
Status: DISCONTINUED | OUTPATIENT
Start: 2024-08-20 | End: 2024-08-20

## 2024-08-20 RX ORDER — FENTANYL CITRATE 50 UG/ML
25 INJECTION, SOLUTION INTRAMUSCULAR; INTRAVENOUS EVERY 5 MIN PRN
Status: DISCONTINUED | OUTPATIENT
Start: 2024-08-20 | End: 2024-08-20 | Stop reason: HOSPADM

## 2024-08-20 RX ORDER — MIDAZOLAM HYDROCHLORIDE 1 MG/ML
INJECTION, SOLUTION INTRAMUSCULAR; INTRAVENOUS AS NEEDED
Status: DISCONTINUED | OUTPATIENT
Start: 2024-08-20 | End: 2024-08-20

## 2024-08-20 ASSESSMENT — PAIN SCALES - GENERAL
PAINLEVEL_OUTOF10: 0 - NO PAIN

## 2024-08-20 ASSESSMENT — PAIN - FUNCTIONAL ASSESSMENT
PAIN_FUNCTIONAL_ASSESSMENT: 0-10

## 2024-08-20 NOTE — DISCHARGE INSTRUCTIONS
Dr. Mendoza - [facility]  Phone: [phone]    Follow-Up Information  For patients receiving Tumor Resection, please follow up with Dr. Mendoza for pathology.   Catheter  You may need a catheter to drain your bladder, depending upon how much swelling you experience after the procedure. Catheters generally are removed during your office follow up. Do not disconnect the catheter from the drainage bag unless directed by a doctor. Wash around the catheter with soap and water and rinse well.    Medication  Please take the medications as prescribed by your doctor. Tylenol or non-steroids! anti-inflammatory medications (such as Aleve®) should relieve mild pain and discomfort. Resume the usual medications you took before surgery unless instructed otherwise.    Resuming Activities and Driving  *NO Driving on the day of surgery  *You may resume driving the day after surgery  *You may resume normal activities as tolerated  *You may shower     Diet  You may resume your normal diet once at home, with no additional considerations.    Expected Signs and Symptoms  You may have a small amount of bleeding with urination on occasion. This may be accompanied with small blood clots. This is normal and should be relieved by increasing your fluid intake.  You may experience some mild burning and discomfort during urination. This is normal and should subside in one to two weeks.      When to Call Your Doctor - Dr. Mendoza [phone]  Please call the office immediately if any of the following symptoms appear:    *Inability to eat, drink, or take medication  *Persistent Nausea or Vomiting  *Fever over 100.4 degrees F. (38 degrees C.)    Please call 9-1-1 if you experience any of the following:  *Chest Pain, Shortness of Breath or Difficulty Breathing  *Sudden one sided weakness/slurred speech/ any signs or symptoms of a stroke  *Severe headache or visual disturbance    Additional Home-Going Instructions for Adults Who Have Had  Anesthesia or Sedatives:    The anesthetics, sedatives and pain killers which were given to you will be acting in your body for the next 24 hours.  This may cause you to feel sleepy.  This feeling will slowly wear off.    For the next 24 hours you SHOULD NOT:  *Drive a car, or operate machinery or power tools  *Drink any form of alcohol (including beer or wine)  *Make any important Decisions

## 2024-08-20 NOTE — ANESTHESIA PREPROCEDURE EVALUATION
Patient: Clara Rashid    Procedure Information       Date/Time: 08/20/24 1130    Procedure: Cystoscopy with Excision Tissue (ct chest and ct urogram after procedure) (Bilateral) - need flouro    Location: [facility] / [facility]    Surgeons: Fazal Mendoza MD            Relevant Problems   Anesthesia (within normal limits)      Cardiac   (+) Essential (primary) hypertension   (+) Heart murmur   (+) Mixed hyperlipidemia   (-) History of coronary artery bypass graft   (-) Pacemaker      Pulmonary   (+) Chronic obstructive lung disease (Multi)   (-) Asthma (HHS-HCC)   (-) MELISSA (obstructive sleep apnea)      Neuro   (-) CVA (cerebral vascular accident) (Multi)   (-) Seizures (Multi)      Endocrine   (+) Type 2 diabetes mellitus without complication, without long-term current use of insulin (Multi)      Hematology   (-) Coagulopathy (Multi)      ID   (+) Onychomycosis      Skin   (+) Squamous cell carcinoma of skin of unspecified parts of face       Clinical information reviewed:   Tobacco  Allergies  Meds  Problems  Med Hx  Surg Hx  OB Status    Fam Hx  Soc Hx        NPO Detail:  NPO/Void Status  Date of Last Liquid: 08/19/24  Time of Last Liquid: 2000  Date of Last Solid: 08/19/24  Time of Last Solid: 2000  Time of Last Void: 1100         Physical Exam    Airway  Mallampati: I  TM distance: >3 FB  Neck ROM: full     Cardiovascular    Dental    Pulmonary    Abdominal            Anesthesia Plan    History of general anesthesia?: yes  History of complications of general anesthesia?: no    ASA 2     general     intravenous induction   Postoperative administration of opioids is intended.  Anesthetic plan and risks discussed with patient.

## 2024-08-20 NOTE — OP NOTE
Cystoscopy with Excision Tissue (ct chest and ct urogram after procedure) (B) Operative Note     Date: 2024  OR Location: [facility]    Name: Clara Rashid, : 1960, Age: 63 y.o., MRN: [id number], Sex: female    Diagnosis  Pre-op Diagnosis      * Bladder mass [N32.89] Post-op Diagnosis     * Bladder mass [N32.89]     Procedures  Cystoscopy with Excision Tissue (ct chest and ct urogram after procedure)  61098 - CO CYSTO W/REMOVAL OF LESIONS SMALL    CO CYSTO/PYELOSCOPY BX&/FULGURATION PELIVC LESION [49919]  Surgeons      * Fazal Mendoza - Primary    Resident/Fellow/Other Assistant:  Surgeons and Role:  * No surgeons found with a matching role *    Procedure Summary  Anesthesia: General  ASA: II  Anesthesia Staff: Anesthesiologist: Jean-Paul Olvera MD  CRNA: KINDRA Salas-CRNA  Estimated Blood Loss: 5mL  Intra-op Medications: Administrations occurring from 1130 to 1215 on 24:  * No intraprocedure medications in log *           Anesthesia Record               Intraprocedure I/O Totals       None           Specimen:   ID Type Source Tests Collected by Time   1 : BLADDER TUMOR Tissue BLADDER BIOPSY SURGICAL PATHOLOGY EXAM Fazal Mendoza MD 2024 1322        Staff:   Circulator: Gabriella Rosa Person: Gladys         Drains and/or Catheters: * None in log *    Tourniquet Times:         Implants:     Findings: small tumor along posterior wall resected, remainder of erythematous areas had resolved. Bladder appeared thin following resection. No evidence of perforation on cystogram.  Tumor ~1cm.    Indications: Clara Rashid is an 63 y.o. female who is having surgery for Bladder mass [N32.89].     The patient was seen in the preoperative area. The risks, benefits, complications, treatment options, non-operative alternatives, expected recovery and outcomes were discussed with the patient. The possibilities of reaction to medication, pulmonary aspiration, injury to surrounding structures,  bleeding, recurrent infection, the need for additional procedures, failure to diagnose a condition, and creating a complication requiring transfusion or operation were discussed with the patient. The patient concurred with the proposed plan, giving informed consent.  The site of surgery was properly noted/marked if necessary per policy. The patient has been actively warmed in preoperative area. Preoperative antibiotics  Venous thrombosis prophylaxis     Procedure Details: After administration of anesthesia the patient was prepped and draped in the usual sterile fashion. A resectoscope was inserted and the papillary tumor identified along the posterior wall. This was resected (on a broad base) with muscle clearly visible. I then inserted a cystoscope and confirmed that there were no other lesions using a 30 and 70 degree leds This area appeared thin and I therefore performed a cystogram to guide duration of catheterization which did not reveal any leak (150cc). And all contrast drained. An 18f ásnchez was placed with plans for removal in ~2 days.   Complications:    none  Disposition: PACU - hemodynamically stable.  Condition: stable         Additional Details: Will obtain CT scan for staging in PACU. Sánchez removal later this week. Given small isolated tumor my bias would be to continue surveillance rather than intravesical chemotherapy if LgTA.    Attending Attestation: I was present and scrubbed for the entire procedure.    Fazal Mendoza  Phone Number: [phone]

## 2024-08-20 NOTE — ANESTHESIA PROCEDURE NOTES
Airway  Date/Time: 8/20/2024 12:57 PM  Urgency: elective    Airway not difficult    Staffing  Performed: CRNA   Authorized by: Jean-Paul Olvera MD    Performed by: KINDRA Salas-CRNA  Patient location during procedure: OR    Indications and Patient Condition  Indications for airway management: anesthesia  Spontaneous ventilation: present  Sedation level: deep  Preoxygenated: yes  Patient position: sniffing  Mask difficulty assessment: 0 - not attempted    Final Airway Details  Final airway type: supraglottic airway      Successful airway: classic  Size 3     Number of attempts at approach: 1

## 2024-08-20 NOTE — ANESTHESIA POSTPROCEDURE EVALUATION
Patient: Clara Rashid    Procedure Summary       Date: 08/20/24 Room / Location: [facility] / [facility]    Anesthesia Start: 1251 Anesthesia Stop: 1347    Procedure: Cystoscopy with Excision Tissue (ct chest and ct urogram after procedure) (Bilateral) Diagnosis:       Bladder mass      (Bladder mass [N32.89])    Surgeons: Fazal Mendoza MD Responsible Provider: Jean-Paul Olvera MD    Anesthesia Type: general ASA Status: 2            Anesthesia Type: general    Vitals Value Taken Time   /76 08/20/24 1400   Temp 36.3 °C (97.3 °F) 08/20/24 1408   Pulse 87 08/20/24 1408   Resp 14 08/20/24 1408   SpO2 95 % 08/20/24 1408       Anesthesia Post Evaluation    Patient location during evaluation: PACU  Patient participation: complete - patient participated  Level of consciousness: awake  Pain management: adequate  Multimodal analgesia pain management approach  Airway patency: patent  Cardiovascular status: acceptable and hemodynamically stable  Respiratory status: acceptable and spontaneous ventilation  Hydration status: euvolemic  Postoperative Nausea and Vomiting: none  Comments: No nausea or vomiting        There were no known notable events for this encounter.

## 2024-08-20 NOTE — PERIOPERATIVE NURSING NOTE
Pt did well in recovery. Pt tolerates po well. No c/o pain or ponv. Pt going to ct scan and ready for phase2.

## 2024-08-20 NOTE — H&P
"History Of Present Illness  Clara Rashid is a 63 y.o. female presenting with bladder cancer.     Past Medical History  Past Medical History:   Diagnosis Date    Bladder cancer (Multi)     Hx antineoplastic chemo 2022    Personal history of other diseases of the respiratory system 07/08/2013    History of acute bronchitis    Personal history of other diseases of the respiratory system 07/08/2013    Personal history of asthma       Surgical History  History reviewed. No pertinent surgical history.     Social History  She reports that she quit smoking about 2 years ago. Her smoking use included cigarettes. She started smoking about 45 years ago. She has a 63.8 pack-year smoking history. She has been exposed to tobacco smoke. She has never used smokeless tobacco. She reports current alcohol use of about 2.0 standard drinks of alcohol per week. She reports current drug use. Drug: Marijuana.    Family History  Family History   Problem Relation Name Age of Onset    Parkinsonism Mother      Hypertension Father      Stroke Father      Diabetes Father      Heart disease Father          Allergies  Bactrim [sulfamethoxazole-trimethoprim]    Review of Systems     Physical Exam     Last Recorded Vitals  Blood pressure 123/78, pulse 88, temperature 36.9 °C (98.4 °F), temperature source Temporal, resp. rate 16, height 1.6 m (5' 2.99\"), weight 76.4 kg (168 lb 6.9 oz), SpO2 96%.    Relevant Results             Assessment/Plan   Assessment & Plan  Bladder mass      turbt       I spent  minutes in the professional and overall care of this patient.      Fazal Mendoza MD    "

## 2024-08-23 ENCOUNTER — OFFICE VISIT (OUTPATIENT)
Dept: UROLOGY | Facility: CLINIC | Age: 64
End: 2024-08-23
Payer: COMMERCIAL

## 2024-08-23 DIAGNOSIS — C67.9 MALIGNANT NEOPLASM OF URINARY BLADDER, UNSPECIFIED SITE (MULTI): Primary | ICD-10-CM

## 2024-08-23 PROCEDURE — 1036F TOBACCO NON-USER: CPT | Performed by: NURSE PRACTITIONER

## 2024-08-23 PROCEDURE — 99024 POSTOP FOLLOW-UP VISIT: CPT | Performed by: NURSE PRACTITIONER

## 2024-08-23 PROCEDURE — 51700 IRRIGATION OF BLADDER: CPT | Performed by: NURSE PRACTITIONER

## 2024-08-23 NOTE — PROGRESS NOTES
[facility]  Outpatient Clinic Note    Subjective   Clara Rashid is a 63 y.o. female    History of Present Illness   Patient presenting to clinic today for TOV s/p TURBT with Dr. Mendoza 8/20/2024  History of urothelial carcinoma. S/p TURBT (3/8/22), (4/5/22), (9/6/22), and BCG induction 2x     Past Medical History and Surgical History   Past Medical History:   Diagnosis Date    Bladder cancer (Multi)     Hx antineoplastic chemo 2022    Personal history of other diseases of the respiratory system 07/08/2013    History of acute bronchitis    Personal history of other diseases of the respiratory system 07/08/2013    Personal history of asthma     No past surgical history on file.    Medications  Current Outpatient Medications on File Prior to Visit   Medication Sig Dispense Refill    acetaminophen (Tylenol) 500 mg tablet Take 2 tablets (1,000 mg) by mouth every 6 hours if needed for mild pain (1 - 3).      albuterol (Proventil HFA) 90 mcg/actuation inhaler Inhale 2 puffs every 4 hours if needed for wheezing or shortness of breath. 6.7 g 5    cephalexin (Keflex) 250 mg capsule Take 1 capsule (250 mg) by mouth 1 time.      dulaglutide 3 mg/0.5 mL pen injector Inject 3 mg under the skin 1 (one) time per week. 6 mL 1    ergocalciferol (Vitamin D-2) 1.25 MG (14126 UT) capsule Take 1 capsule (50,000 Units) by mouth 1 (one) time per week. 12 capsule 0    lisinopril 5 mg tablet Take 1 tablet (5 mg) by mouth once daily. 90 tablet 1    metFORMIN  mg 24 hr tablet Take 1 tablet (500 mg) by mouth 2 times a day. 180 tablet 1    psyllium husk (METAMUCIL ORAL) Take by mouth.      rosuvastatin (Crestor) 5 mg tablet Take 1 tablet (5 mg) by mouth once daily. 90 tablet 1    Wixela Inhub 500-50 mcg/dose diskus inhaler Inhale 1 puff 2 times a day.       No current facility-administered medications on file prior to visit.       Objective   Physicial Exam  General: Well developed, well nourished, alert and cooperative,  appears in no acute distress  Eyes: Non-injected conjunctiva, sclera clear, no proptosis  Cardiac: Extremities are warm and well perfused. No edema, cyanosis or pallor.   Lungs: Breathing is easy, non-labored. Speaking in clear and complete sentences. Normal diaphragmatic movement.  MSK: Ambulatory with steady gait, unassisted  Neuro: alert and oriented to person, place and time  Psych: Demonstrates good judgement and reason, without hallucinations, abnormal affect or abnormal behaviors.  Skin: no obvious lesions, no rashes.    No visits with results within 1 Day(s) from this visit.   Latest known visit with results is:   Admission on 08/20/2024, Discharged on 08/20/2024   Component Date Value Ref Range Status    POCT Glucose 08/20/2024 112 (H)  74 - 99 mg/dL Final      Review of Systems  All other systems have been reviewed and are negative for complaint.      Assessment and Plan   We discussed postoperative urinary retention in great detail. We discussed that different medications, including anesthesia can influence urinary retention. We discussed that postoperative constipation can also contribute to urinary retention. We discussed management of urinary retention to include indwelling catheter or CIC. We discussed risks of unmanaged urinary retention including irreversible kidney injury and increased risk of UTI. We discussed TOV today.    [] TOV today passed. 120 ml sterile water instilled, 120 ml output clear yellow urine   [] Drink plenty of fluids to maintain hydration and clear urine output  [] You may have some irritative voiding symptoms over the next few days: burning, frequency, urgency  [] Activity restrictions reviewed    she will return to clinic as previously scheduled for post-operative visit with Dr. Mendoza, or sooner if needed.    All questions and concerns were addressed. Patient verbalizes understanding and has no other questions at this time.     Leonora Perez-- APRN, CNP  Office Phone:   [phone]

## 2024-08-26 DIAGNOSIS — R91.1 LUNG NODULE: Primary | ICD-10-CM

## 2024-08-29 ENCOUNTER — TELEMEDICINE (OUTPATIENT)
Dept: PRIMARY CARE | Facility: CLINIC | Age: 64
End: 2024-08-29
Payer: COMMERCIAL

## 2024-08-29 DIAGNOSIS — R91.1 LUNG NODULE: ICD-10-CM

## 2024-08-29 DIAGNOSIS — R59.1 LYMPHADENOPATHY: ICD-10-CM

## 2024-08-29 DIAGNOSIS — R91.1 PULMONARY NODULE 1 CM OR GREATER IN DIAMETER: Primary | ICD-10-CM

## 2024-08-29 DIAGNOSIS — R91.8 LUNG NODULES: ICD-10-CM

## 2024-08-29 DIAGNOSIS — D38.1 NEOPLASM OF UNCERTAIN BEHAVIOR OF LUNG: ICD-10-CM

## 2024-08-29 DIAGNOSIS — C67.9 MALIGNANT NEOPLASM OF URINARY BLADDER, UNSPECIFIED SITE (MULTI): ICD-10-CM

## 2024-08-29 SDOH — ECONOMIC STABILITY: FOOD INSECURITY: WITHIN THE PAST 12 MONTHS, YOU WORRIED THAT YOUR FOOD WOULD RUN OUT BEFORE YOU GOT MONEY TO BUY MORE.: NEVER TRUE

## 2024-08-29 SDOH — ECONOMIC STABILITY: FOOD INSECURITY: WITHIN THE PAST 12 MONTHS, THE FOOD YOU BOUGHT JUST DIDN'T LAST AND YOU DIDN'T HAVE MONEY TO GET MORE.: NEVER TRUE

## 2024-08-29 ASSESSMENT — ENCOUNTER SYMPTOMS
DEPRESSION: 0
OCCASIONAL FEELINGS OF UNSTEADINESS: 0
LOSS OF SENSATION IN FEET: 0

## 2024-08-29 ASSESSMENT — LIFESTYLE VARIABLES
HOW OFTEN DO YOU HAVE A DRINK CONTAINING ALCOHOL: 2-4 TIMES A MONTH
AUDIT-C TOTAL SCORE: 2
SKIP TO QUESTIONS 9-10: 1
HOW OFTEN DO YOU HAVE SIX OR MORE DRINKS ON ONE OCCASION: NEVER
HOW MANY STANDARD DRINKS CONTAINING ALCOHOL DO YOU HAVE ON A TYPICAL DAY: 1 OR 2

## 2024-08-29 ASSESSMENT — PAIN SCALES - GENERAL: PAINLEVEL: 0-NO PAIN

## 2024-08-29 NOTE — PROGRESS NOTES
Subjective   Patient ID: Clara Rashid is a 63 y.o. female who presents for New Patient Visit (Clara has a new visit regarding a lung nodule. Former smoker and she smoked for 45 years 1/2 pack per day.  She has bladder cancer.  Maternal grandmother had brain cancer. ).  HPI 63-year-old female presents today for lung nodule clinic.    Telephone visit between Clara Rashid and Tiffanie Martinez CNP at [facility] lung nodule clinic per patient request.    Former smoker and she smoked for 45 years 1/2 pack per day.  She has bladder cancer.  Maternal grandmother had brain cancer.    8/20/2024 CT Chest without IV contrast s/p turp  New 1.1 cm nodule in the superior segment left lower lobe abutting  left fissure (series 4, image 7). Additional scattered small nodular  opacities are stable compared to 09/29/2023.    CT chest without IV contrast dated 9/29/202  Mild biapical scarring. Small focus right lung apex (series 205, image  61), likely secondary to surrounding bronchial wall thickening. A few  indeterminate pulmonary nodules are noted. For example 5 mm left  lower lobe nodule (series 205, image 124) 5 mm and 3 mm right lower  lobe perifissural nodules (image 130), likely lymph nodes; 3 mm right  lower lobe nodule (image 129) and 2 mm right middle lobe nodule  (image 173). Pneumothorax no pleural effusion. Mild upper lobe  predominant emphysema.  Review of Systems  Review of systems: Present-feeling well. Not present-chills, fatigue and fever.  Respiratory: Not present-difficulty breathing, cough, bloody sputum.  Cardiovascular: Not present-chest pain, palpitations, dyspnea on exertion.  Objective   There were no vitals taken for this visit.   Assessment/Plan   Diagnoses and all orders for this visit:  Pulmonary nodule 1 cm or greater in diameter  -     NM PET CT lung CA initial diagnosis; Future  -     Referral to Thoracic Surgery; Future  Lung nodule  -     Referral to Lung Nodule Center  Lung  nodules  -     NM PET CT lung CA initial diagnosis; Future  Lymphadenopathy  -     NM PET CT lung CA initial diagnosis; Future  -     Referral to Thoracic Surgery; Future  Malignant neoplasm of urinary bladder, unspecified site (Multi)  -     NM PET CT lung CA initial diagnosis; Future  -     Referral to Thoracic Surgery; Future  Neoplasm of uncertain behavior of lung  -     NM PET CT lung CA initial diagnosis; Future  -     Referral to Thoracic Surgery; Future

## 2024-08-29 NOTE — PATIENT INSTRUCTIONS
New 1.1 cm lung nodule left lower lobe abutting the left fissure.  Additional scattered small nodular opacities are stable complared to 9/29/2023.    History of bladder cancer  Recommend  PET CT Lung Cancer initial and referral to thoracic surgery as needed.   Patient to follow up with Dr. Mendoza - Medical Oncology as well.        Lung Nodule Clinic    [facility], [address]  [place], [place] [address]  Phone [phone]  Fax [phone]  Nurse Coordinator [phone]                                          Welcome to the [facility] Lung Nodule Clinic    Today was the initial consult with the lung nodule clinic to determine proper recommendations for follow up. Your care is coordinated to ensure timely management.  As you know, early detection of cancer is very important.  Nodules that are large, look suspicious or have changed over time is why further evaluation such as the additional imaging test that we have ordered is needed. Our clinic will work closely with you in choosing the best next step.       What is my next step?  We will assist with scheduling scans, results reviews, and referrals for priority appointments.      Who do I call?  Your care coordinator for the lung nodule clinic can be contacted at [phone]  All scheduling needs can be assisted within the Cardiac Surgery/Thoracic Surgery/Lung Nodule Clinic offices at [phone].              Table  Moise H, Tahira DP, Amber DEMARCO, et al. Guidelines for Management of Incidental Pulmonary Nodules Detected on CT Images: From the Fleischner Society 2017. Radiology 2017;284:228-243.

## 2024-09-04 NOTE — PROGRESS NOTES
HPI:  Proc (3/8/22): TURBT large, R URS, stent placement, retrograde pyelogram  Path: Papillary urothelial carcinoma, low grade with focal high grade cytologic features, focal area suspicious for lamina propria invasion (TURBT), non-invasive papillary urothelial carcinoma, low grade (left bladder wall biopsy), partially-denuded urothelial mucosa with inflammation and focal atypia that does not meet the threshold for carcinoma in situ (deep bladder biopsy)     Proc (4/5/22): TURBT medium, stent exchange  Path: Partially denuded urothelial mucosa with chronic inflammation and proliferative changes, favor reactive, changes consistent with previous procedure, muscularis propria negative for carcinoma (TURBT), smooth muscle and adipose tissue negative for carcinoma (deep margin tumor biopsy)     Proc (9/6/22): TURBT small  Path: focal high-grade and predominantly low-grade papillary urothelial carcinoma, no definitive lamina propria invasion, muscularis propria uninvolved by carcinoma    Proc (8/20/24): TURBT small with bluelight cystoscopy  Path: non-invasive papillary urothelial carcinoma, low-grade     BCG induction 2x     63 year old female referred by Dr. Mcdonald with gross hematuria. Patient had a cystoscopy with Dr. Mcdonald (2/23/22) which showed a large tumor on the R lateral wall, and a small tumor on the L trigone. CT Urogram (2/28/22) with two synchronous urinary bladder masses, larger mass involves/surrounds right UVJ without obstructing it, smaller mass near/involving portion of left UVJ without obstructing it, a few nonspecific <6 mm solid indeterminate bibasilar lung nodules. S/p TURBT large (3/8/22) with pathology showing papillary urothelial carcinoma, low grade with focal high grade cytologic features, focal area suspicious for lamina propria invasion. S/p TURBT medium (4/5/22) with pathology showing partially denuded urothelial mucosa with chronic inflammation and proliferative changes consistent with  previous procedure, muscularis propria, smooth muscle and adipose tissue negative for carcinoma. Completed BCG. S/p TURBT small (9/6/22) with pathology showing focal high-grade and predominantly low-grade papillary urothelial carcinoma, no definitive lamina propria invasion, muscularis propria uninvolved by carcinoma. CT Urogram (9/29/23) showed no enhancing mass or filling defect in the bilateral ureters or bladder to suggest residual/recurrent disease and no evidence of distant disease. CT chest (9/29/23) showed few indeterminate less than 6 mm pulmonary nodules, otherwise, no suspicious pulmonary nodule or thoracic adenopathy. S/p TURBT small with bluelight cystoscopy (8/20/24) with pathology showing non-invasive papillary urothelial carcinoma, low-grade. Doing well.      Smoker x 43 years. Has quit smoking tobacco in February 22'. Continues to smoke marijuana   SHx: Mohs, rotator cuff, 2 C-sections.   FHx: Grandmother with bladder cancer (age 90). Did not die of disease.     Cre: 0.70 (8/12/24), 0.83 (2/23/22)     CT Urogram (2/28/22): Two synchronous urinary bladder masses. The larger mass involves/surrounds the right UVJ without obstructing it. The smaller mass is near/involving a portion of the left UVJ without obstructing it. A few nonspecific <6 mm solid indeterminate bibasilar lung nodules.     CT Urogram (9/29/23): no enhancing mass or filling defect in the bilateral ureters or bladder to suggest residual/recurrent disease and no evidence of distant disease.    CT chest (9/29/23): few indeterminate less than 6 mm pulmonary nodules, otherwise, no suspicious pulmonary nodule or thoracic adenopathy.    Review of Systems:  All systems reviewed. Anything negative noted in the HPI.    Physical Exam:  Virtual visit.     Procedures    Assessment/Plan   63 year old female referred by Dr. Mcdonald with gross hematuria. Patient had a cystoscopy with Dr. Mcdonald (2/23/22) which showed a large tumor on the R lateral  wall, and a small tumor on the L trigone. CT Urogram (2/28/22) with two synchronous urinary bladder masses, larger mass involves/surrounds right UVJ without obstructing it, smaller mass near/involving portion of left UVJ without obstructing it, a few nonspecific <6 mm solid indeterminate bibasilar lung nodules. Completed BCG. S/p TURBT small (9/6/22) with pathology showing focal high-grade and predominantly low-grade papillary urothelial carcinoma, no definitive lamina propria invasion, muscularis propria uninvolved by carcinoma. CT Urogram (9/29/23) showed no enhancing mass or filling defect in the bilateral ureters or bladder to suggest residual/recurrent disease and no evidence of distant disease. CT chest (9/29/23) showed few indeterminate less than 6 mm pulmonary nodules, otherwise, no suspicious pulmonary nodule or thoracic adenopathy. S/p TURBT small with bluelight cystoscopy (8/20/24) with pathology showing non-invasive papillary urothelial carcinoma, low-grade. Doing well. Management options including risks, benefits and alternatives discussed at length and all questions answered. Patient prefers to proceed with follow-up in 3mos for cystoscopy.             Scribe Attestation  By signing my name below, I, Chandler Díaz   attest that this documentation has been prepared under the direction and in the presence of Fazal Mendoza MD.

## 2024-09-05 ENCOUNTER — APPOINTMENT (OUTPATIENT)
Dept: VASCULAR SURGERY | Facility: CLINIC | Age: 64
End: 2024-09-05
Payer: COMMERCIAL

## 2024-09-05 ENCOUNTER — APPOINTMENT (OUTPATIENT)
Dept: UROLOGY | Facility: CLINIC | Age: 64
End: 2024-09-05
Payer: COMMERCIAL

## 2024-09-05 DIAGNOSIS — C67.9 MALIGNANT NEOPLASM OF URINARY BLADDER, UNSPECIFIED SITE (MULTI): Primary | ICD-10-CM

## 2024-09-05 PROCEDURE — 3048F LDL-C <100 MG/DL: CPT | Performed by: STUDENT IN AN ORGANIZED HEALTH CARE EDUCATION/TRAINING PROGRAM

## 2024-09-05 PROCEDURE — 4010F ACE/ARB THERAPY RXD/TAKEN: CPT | Performed by: STUDENT IN AN ORGANIZED HEALTH CARE EDUCATION/TRAINING PROGRAM

## 2024-09-05 PROCEDURE — 99214 OFFICE O/P EST MOD 30 MIN: CPT | Performed by: STUDENT IN AN ORGANIZED HEALTH CARE EDUCATION/TRAINING PROGRAM

## 2024-09-05 PROCEDURE — 3044F HG A1C LEVEL LT 7.0%: CPT | Performed by: STUDENT IN AN ORGANIZED HEALTH CARE EDUCATION/TRAINING PROGRAM

## 2024-09-12 ENCOUNTER — HOSPITAL ENCOUNTER (OUTPATIENT)
Dept: RADIOLOGY | Facility: CLINIC | Age: 64
Discharge: HOME | End: 2024-09-12
Payer: COMMERCIAL

## 2024-09-12 ENCOUNTER — OFFICE VISIT (OUTPATIENT)
Dept: VASCULAR SURGERY | Facility: CLINIC | Age: 64
End: 2024-09-12
Payer: COMMERCIAL

## 2024-09-12 VITALS
OXYGEN SATURATION: 94 % | WEIGHT: 172.7 LBS | BODY MASS INDEX: 29.49 KG/M2 | HEIGHT: 64 IN | HEART RATE: 86 BPM | SYSTOLIC BLOOD PRESSURE: 116 MMHG | DIASTOLIC BLOOD PRESSURE: 71 MMHG

## 2024-09-12 DIAGNOSIS — R91.8 LUNG NODULES: ICD-10-CM

## 2024-09-12 DIAGNOSIS — R59.1 LYMPHADENOPATHY: ICD-10-CM

## 2024-09-12 DIAGNOSIS — C67.9 MALIGNANT NEOPLASM OF URINARY BLADDER, UNSPECIFIED SITE (MULTI): ICD-10-CM

## 2024-09-12 DIAGNOSIS — I83.10 VARICOSE VEIN OF LOWER EXTREMITY WITH PHLEBITIS: Primary | ICD-10-CM

## 2024-09-12 DIAGNOSIS — R91.1 PULMONARY NODULE 1 CM OR GREATER IN DIAMETER: ICD-10-CM

## 2024-09-12 DIAGNOSIS — M25.572 ACUTE LEFT ANKLE PAIN: ICD-10-CM

## 2024-09-12 DIAGNOSIS — D38.1 NEOPLASM OF UNCERTAIN BEHAVIOR OF LUNG: ICD-10-CM

## 2024-09-12 PROCEDURE — A9552 F18 FDG: HCPCS | Performed by: NURSE PRACTITIONER

## 2024-09-12 PROCEDURE — 3078F DIAST BP <80 MM HG: CPT | Performed by: NURSE PRACTITIONER

## 2024-09-12 PROCEDURE — 3008F BODY MASS INDEX DOCD: CPT | Performed by: NURSE PRACTITIONER

## 2024-09-12 PROCEDURE — 99213 OFFICE O/P EST LOW 20 MIN: CPT | Performed by: NURSE PRACTITIONER

## 2024-09-12 PROCEDURE — 3048F LDL-C <100 MG/DL: CPT | Performed by: NURSE PRACTITIONER

## 2024-09-12 PROCEDURE — 99203 OFFICE O/P NEW LOW 30 MIN: CPT | Performed by: NURSE PRACTITIONER

## 2024-09-12 PROCEDURE — 1036F TOBACCO NON-USER: CPT | Performed by: NURSE PRACTITIONER

## 2024-09-12 PROCEDURE — 3074F SYST BP LT 130 MM HG: CPT | Performed by: NURSE PRACTITIONER

## 2024-09-12 PROCEDURE — 78815 PET IMAGE W/CT SKULL-THIGH: CPT

## 2024-09-12 PROCEDURE — 4010F ACE/ARB THERAPY RXD/TAKEN: CPT | Performed by: NURSE PRACTITIONER

## 2024-09-12 PROCEDURE — 3430000001 HC RX 343 DIAGNOSTIC RADIOPHARMACEUTICALS: Performed by: NURSE PRACTITIONER

## 2024-09-12 PROCEDURE — 3044F HG A1C LEVEL LT 7.0%: CPT | Performed by: NURSE PRACTITIONER

## 2024-09-12 RX ORDER — FLUDEOXYGLUCOSE F 18 200 MCI/ML
10.8 INJECTION, SOLUTION INTRAVENOUS
Status: COMPLETED | OUTPATIENT
Start: 2024-09-12 | End: 2024-09-12

## 2024-09-12 ASSESSMENT — COLUMBIA-SUICIDE SEVERITY RATING SCALE - C-SSRS
1. IN THE PAST MONTH, HAVE YOU WISHED YOU WERE DEAD OR WISHED YOU COULD GO TO SLEEP AND NOT WAKE UP?: NO
2. HAVE YOU ACTUALLY HAD ANY THOUGHTS OF KILLING YOURSELF?: NO
6. HAVE YOU EVER DONE ANYTHING, STARTED TO DO ANYTHING, OR PREPARED TO DO ANYTHING TO END YOUR LIFE?: NO

## 2024-09-12 ASSESSMENT — PATIENT HEALTH QUESTIONNAIRE - PHQ9
SUM OF ALL RESPONSES TO PHQ9 QUESTIONS 1 AND 2: 0
2. FEELING DOWN, DEPRESSED OR HOPELESS: NOT AT ALL
1. LITTLE INTEREST OR PLEASURE IN DOING THINGS: NOT AT ALL

## 2024-09-12 ASSESSMENT — PAIN SCALES - GENERAL: PAINLEVEL: 0-NO PAIN

## 2024-09-12 NOTE — PROGRESS NOTES
Subjective   Patient ID: Clara Rashid is a 63 y.o. female who presents for Consult and new pt.    HPI    This is a 63 year old female who was referred by Dr Dunn for further evaluation of varicose veins.  She has history of malignant neoplasm of urinary bladder and is followed by urology, also underwent PET scan recently with nodules noted, awaiting follow-up for this appointment.  Patient has left medial ankle varices with tenderness, states discoloration has been present for years but area became tender in June.  She then noticed discoloration varices to the anterior ankle in July.  She quit smoking in 2022.  She does have diffuse varicosities of the left posterior knee and calf.  She has some ropey varicosities of the right calf.  She endorses mild lower extremity swelling.  She denies claudication.  Denies ischemic rest pain.  Endorses some cramping of the feet.    Review of Systems  As noted in the HPI    Objective   Physical Exam  Constitutional:  Alert and oriented to person, place, date/time in no acute distress.  HEENT:  Atraumatic, normocephalic. PERRL. EOMI.  Nares patent.  Mucous membranes moist.  .   Neck:  Trachea midline.  Respiratory:  Clear to auscultation.  Cardiac:  Regular rate and rhythm.  No murmurs.  Cardiovascular: Edema of the lower extremities pulse exam: Radial and femoral pulses palpable bilateral. Pedal pulses: PT palpable.  Left lower extremity: Diffuse ropey varicosities left posterior knee and calf, varices noted to left medial and anterior ankle with tenderness.  Right lower extremity: Diffuse ropey varicosity noted to the right calf.  Abdominal:  Soft, nontender, nondistended, bowel sounds present.  Musculoskeletal:  Moves extremities freely.  Dermatological: Clean and dry   Neurological: Alert and oriented to person, place, date/time  Psych:  Calm, cooperative      Assessment/Plan     Assessment and Plan  Varicose Veins  Venous Insufficiency  Lower extremity edema /  pain    Patient advised to undergo VI studies, return to clinic in 1 to 2 months and have test performed, prior to office visit  Advised follow-up with Mandi Velazquez CNP for results of studies and to discuss sclerotherapy to left medial anterior ankle varices.  Elevation throughout the day  NSAIDs as needed  Weight loss  Walking 25 minutes daily, 5 times per week         KINDRA Pearson-CNP 09/12/24 11:38 AM

## 2024-09-16 ENCOUNTER — OFFICE VISIT (OUTPATIENT)
Dept: CARDIAC SURGERY | Facility: CLINIC | Age: 64
End: 2024-09-16
Payer: COMMERCIAL

## 2024-09-16 VITALS
WEIGHT: 167 LBS | RESPIRATION RATE: 18 BRPM | SYSTOLIC BLOOD PRESSURE: 116 MMHG | HEART RATE: 89 BPM | OXYGEN SATURATION: 97 % | BODY MASS INDEX: 29.59 KG/M2 | DIASTOLIC BLOOD PRESSURE: 72 MMHG | HEIGHT: 63 IN

## 2024-09-16 DIAGNOSIS — C67.9 MALIGNANT NEOPLASM OF URINARY BLADDER, UNSPECIFIED SITE (MULTI): ICD-10-CM

## 2024-09-16 DIAGNOSIS — R22.0 NODULE OF TONGUE: ICD-10-CM

## 2024-09-16 DIAGNOSIS — R59.1 LYMPHADENOPATHY: ICD-10-CM

## 2024-09-16 DIAGNOSIS — R91.1 LUNG NODULE: ICD-10-CM

## 2024-09-16 DIAGNOSIS — R91.1 PULMONARY NODULE 1 CM OR GREATER IN DIAMETER: ICD-10-CM

## 2024-09-16 DIAGNOSIS — D38.1 NEOPLASM OF UNCERTAIN BEHAVIOR OF LUNG: ICD-10-CM

## 2024-09-16 PROCEDURE — 3044F HG A1C LEVEL LT 7.0%: CPT | Performed by: THORACIC SURGERY (CARDIOTHORACIC VASCULAR SURGERY)

## 2024-09-16 PROCEDURE — 3008F BODY MASS INDEX DOCD: CPT | Performed by: THORACIC SURGERY (CARDIOTHORACIC VASCULAR SURGERY)

## 2024-09-16 PROCEDURE — 4010F ACE/ARB THERAPY RXD/TAKEN: CPT | Performed by: THORACIC SURGERY (CARDIOTHORACIC VASCULAR SURGERY)

## 2024-09-16 PROCEDURE — 99205 OFFICE O/P NEW HI 60 MIN: CPT | Performed by: THORACIC SURGERY (CARDIOTHORACIC VASCULAR SURGERY)

## 2024-09-16 PROCEDURE — 3078F DIAST BP <80 MM HG: CPT | Performed by: THORACIC SURGERY (CARDIOTHORACIC VASCULAR SURGERY)

## 2024-09-16 PROCEDURE — 3074F SYST BP LT 130 MM HG: CPT | Performed by: THORACIC SURGERY (CARDIOTHORACIC VASCULAR SURGERY)

## 2024-09-16 PROCEDURE — 3048F LDL-C <100 MG/DL: CPT | Performed by: THORACIC SURGERY (CARDIOTHORACIC VASCULAR SURGERY)

## 2024-09-16 PROCEDURE — 1036F TOBACCO NON-USER: CPT | Performed by: THORACIC SURGERY (CARDIOTHORACIC VASCULAR SURGERY)

## 2024-09-16 PROCEDURE — 99215 OFFICE O/P EST HI 40 MIN: CPT | Performed by: THORACIC SURGERY (CARDIOTHORACIC VASCULAR SURGERY)

## 2024-09-16 ASSESSMENT — ENCOUNTER SYMPTOMS
FEVER: 0
ABDOMINAL DISTENTION: 0
STRIDOR: 0
PALPITATIONS: 0
CONSTIPATION: 0
WHEEZING: 0
MUSCULOSKELETAL NEGATIVE: 1
SHORTNESS OF BREATH: 0
ENDOCRINE NEGATIVE: 1
ALLERGIC/IMMUNOLOGIC NEGATIVE: 1
APPETITE CHANGE: 0
ABDOMINAL PAIN: 0
UNEXPECTED WEIGHT CHANGE: 0
CHILLS: 0
LOSS OF SENSATION IN FEET: 0
DEPRESSION: 0
NAUSEA: 0
CHOKING: 0
COUGH: 0
PSYCHIATRIC NEGATIVE: 1
HEMATOLOGIC/LYMPHATIC NEGATIVE: 1
CHEST TIGHTNESS: 0
VOMITING: 0
DIARRHEA: 0
FATIGUE: 0
EYES NEGATIVE: 1
OCCASIONAL FEELINGS OF UNSTEADINESS: 0
NEUROLOGICAL NEGATIVE: 1
DIAPHORESIS: 0

## 2024-09-16 ASSESSMENT — PAIN SCALES - GENERAL: PAINLEVEL: 0-NO PAIN

## 2024-09-16 NOTE — PROGRESS NOTES
Subjective   Patient ID: Clara Rashid is a 63 y.o. female who presents for Consult (Lung nodule ).  HPI    63-year-old female with a remote history of smoking who has a history of bladder cancer that has been removed couple of times.  Seems like the disease under control.  She underwent a CT of the chest revealing a 1.1 cm left lower lobe lung nodule.  Compared to MRI scan this seems bigger.  She underwent a PET scan showing an SUV max of 4.1.  There is also activity at the base of the tongue and in the neck.  This seems to be inflammatory however we will refer her to ENT for evaluation.  I discussion with her about best next steps.  I think this is peripheral enough that we can do a diagnostic wedge resection and potentially it could be therapeutic as well.  Will obtain PFTs to assess her candidacy.  However, she is a very active woman with no shortness of breath so I will be surprised if the PFTs are not good for surgery.  Will plan to do these at [facility] on October 28.  We discussed the mechanics of a robotic lung resection including expected recovery and potential complications.    Review of Systems   Constitutional:  Negative for appetite change, chills, diaphoresis, fatigue, fever and unexpected weight change.   HENT: Negative.     Eyes: Negative.    Respiratory:  Negative for cough, choking, chest tightness, shortness of breath, wheezing and stridor.    Cardiovascular:  Negative for chest pain, palpitations and leg swelling.   Gastrointestinal:  Negative for abdominal distention, abdominal pain, constipation, diarrhea, nausea and vomiting.   Endocrine: Negative.    Genitourinary: Negative.    Musculoskeletal: Negative.    Skin: Negative.    Allergic/Immunologic: Negative.    Neurological: Negative.    Hematological: Negative.    Psychiatric/Behavioral: Negative.     All other systems reviewed and are negative.      Objective   Physical Exam  Constitutional:       Appearance: Normal appearance.   HENT:       Head: Normocephalic and atraumatic.      Nose: Nose normal.      Mouth/Throat:      Mouth: Mucous membranes are moist.      Pharynx: Oropharynx is clear.   Eyes:      Extraocular Movements: Extraocular movements intact.      Conjunctiva/sclera: Conjunctivae normal.      Pupils: Pupils are equal, round, and reactive to light.   Cardiovascular:      Rate and Rhythm: Normal rate and regular rhythm.      Pulses: Normal pulses.      Heart sounds: Normal heart sounds.   Pulmonary:      Effort: Pulmonary effort is normal. No respiratory distress.      Breath sounds: Normal breath sounds. No stridor. No wheezing, rhonchi or rales.   Chest:      Chest wall: No tenderness.   Abdominal:      General: Abdomen is flat. Bowel sounds are normal.      Palpations: Abdomen is soft.   Musculoskeletal:         General: Normal range of motion.      Cervical back: Normal range of motion and neck supple.   Skin:     General: Skin is warm and dry.      Capillary Refill: Capillary refill takes less than 2 seconds.   Neurological:      General: No focal deficit present.      Mental Status: She is alert and oriented to person, place, and time.         Assessment/Plan   Diagnoses and all orders for this visit:    Neoplasm of uncertain behavior of lung  -     Referral to Thoracic Surgery  -     Case Request Operating Room: Resection Robot-Assisted Lung Wedge  -     Request for Pre-Admission Testing Visit; Future  - ENT referral         Phillip Garrido MD 09/16/24 5:03 PM

## 2024-09-16 NOTE — PATIENT INSTRUCTIONS
New:  Referral to ENT  A Pulmonary Function Test (PFT) has been ordered for you    We will call you after your PFT is complete to further discuss surgery plans. Thank you!

## 2024-10-14 ENCOUNTER — APPOINTMENT (OUTPATIENT)
Dept: VASCULAR MEDICINE | Facility: CLINIC | Age: 64
End: 2024-10-14
Payer: COMMERCIAL

## 2024-10-16 ENCOUNTER — APPOINTMENT (OUTPATIENT)
Dept: VASCULAR MEDICINE | Facility: CLINIC | Age: 64
End: 2024-10-16
Payer: COMMERCIAL

## 2024-10-16 ENCOUNTER — HOSPITAL ENCOUNTER (OUTPATIENT)
Dept: RESPIRATORY THERAPY | Facility: HOSPITAL | Age: 64
Setting detail: THERAPIES SERIES
Discharge: HOME | End: 2024-10-16
Payer: COMMERCIAL

## 2024-10-16 DIAGNOSIS — R91.1 LUNG NODULE: ICD-10-CM

## 2024-10-16 PROCEDURE — 94729 DIFFUSING CAPACITY: CPT | Performed by: INTERNAL MEDICINE

## 2024-10-16 PROCEDURE — 2500000002 HC RX 250 W HCPCS SELF ADMINISTERED DRUGS (ALT 637 FOR MEDICARE OP, ALT 636 FOR OP/ED)

## 2024-10-16 PROCEDURE — 94726 PLETHYSMOGRAPHY LUNG VOLUMES: CPT

## 2024-10-16 PROCEDURE — 94726 PLETHYSMOGRAPHY LUNG VOLUMES: CPT | Performed by: INTERNAL MEDICINE

## 2024-10-16 PROCEDURE — 94060 EVALUATION OF WHEEZING: CPT | Performed by: INTERNAL MEDICINE

## 2024-10-16 RX ORDER — ALBUTEROL SULFATE 0.83 MG/ML
SOLUTION RESPIRATORY (INHALATION)
Status: COMPLETED
Start: 2024-10-16 | End: 2024-10-16

## 2024-10-21 ENCOUNTER — ANCILLARY PROCEDURE (OUTPATIENT)
Dept: VASCULAR MEDICINE | Facility: CLINIC | Age: 64
End: 2024-10-21
Payer: COMMERCIAL

## 2024-10-21 ENCOUNTER — LAB (OUTPATIENT)
Dept: LAB | Facility: LAB | Age: 64
End: 2024-10-21
Payer: COMMERCIAL

## 2024-10-21 ENCOUNTER — PRE-ADMISSION TESTING (OUTPATIENT)
Dept: PREADMISSION TESTING | Facility: HOSPITAL | Age: 64
End: 2024-10-21
Payer: COMMERCIAL

## 2024-10-21 ENCOUNTER — APPOINTMENT (OUTPATIENT)
Dept: OTOLARYNGOLOGY | Facility: CLINIC | Age: 64
End: 2024-10-21
Payer: COMMERCIAL

## 2024-10-21 VITALS
WEIGHT: 169.8 LBS | SYSTOLIC BLOOD PRESSURE: 103 MMHG | OXYGEN SATURATION: 98 % | BODY MASS INDEX: 30.09 KG/M2 | HEART RATE: 80 BPM | TEMPERATURE: 98.6 F | HEIGHT: 63 IN | DIASTOLIC BLOOD PRESSURE: 68 MMHG

## 2024-10-21 VITALS — WEIGHT: 169 LBS | BODY MASS INDEX: 29.95 KG/M2 | HEIGHT: 63 IN

## 2024-10-21 DIAGNOSIS — Z01.818 PREOPERATIVE TESTING: Primary | ICD-10-CM

## 2024-10-21 DIAGNOSIS — R91.1 LUNG NODULE: ICD-10-CM

## 2024-10-21 DIAGNOSIS — R09.89 BRUIT OF LEFT CAROTID ARTERY: ICD-10-CM

## 2024-10-21 DIAGNOSIS — E55.9 VITAMIN D DEFICIENCY: ICD-10-CM

## 2024-10-21 DIAGNOSIS — Z01.818 PREOPERATIVE TESTING: ICD-10-CM

## 2024-10-21 DIAGNOSIS — Z00.00: Primary | ICD-10-CM

## 2024-10-21 LAB
25(OH)D3 SERPL-MCNC: 66 NG/ML (ref 30–100)
ABO GROUP (TYPE) IN BLOOD: NORMAL
ANION GAP SERPL CALCULATED.3IONS-SCNC: 15 MMOL/L (ref 10–20)
ANTIBODY SCREEN: NORMAL
BUN SERPL-MCNC: 12 MG/DL (ref 6–23)
CALCIUM SERPL-MCNC: 10.1 MG/DL (ref 8.6–10.3)
CHLORIDE SERPL-SCNC: 101 MMOL/L (ref 98–107)
CO2 SERPL-SCNC: 27 MMOL/L (ref 21–32)
CREAT SERPL-MCNC: 0.64 MG/DL (ref 0.5–1.05)
EGFRCR SERPLBLD CKD-EPI 2021: >90 ML/MIN/1.73M*2
ERYTHROCYTE [DISTWIDTH] IN BLOOD BY AUTOMATED COUNT: 12 % (ref 11.5–14.5)
GLUCOSE SERPL-MCNC: 95 MG/DL (ref 74–99)
HCT VFR BLD AUTO: 45.5 % (ref 36–46)
HGB BLD-MCNC: 14.7 G/DL (ref 12–16)
MCH RBC QN AUTO: 32.6 PG (ref 26–34)
MCHC RBC AUTO-ENTMCNC: 32.3 G/DL (ref 32–36)
MCV RBC AUTO: 101 FL (ref 80–100)
NRBC BLD-RTO: 0 /100 WBCS (ref 0–0)
PLATELET # BLD AUTO: 313 X10*3/UL (ref 150–450)
POTASSIUM SERPL-SCNC: 4.9 MMOL/L (ref 3.5–5.3)
RBC # BLD AUTO: 4.51 X10*6/UL (ref 4–5.2)
RH FACTOR (ANTIGEN D): NORMAL
SODIUM SERPL-SCNC: 138 MMOL/L (ref 136–145)
WBC # BLD AUTO: 10 X10*3/UL (ref 4.4–11.3)

## 2024-10-21 PROCEDURE — 36415 COLL VENOUS BLD VENIPUNCTURE: CPT

## 2024-10-21 PROCEDURE — 86901 BLOOD TYPING SEROLOGIC RH(D): CPT

## 2024-10-21 PROCEDURE — 99203 OFFICE O/P NEW LOW 30 MIN: CPT

## 2024-10-21 PROCEDURE — 3044F HG A1C LEVEL LT 7.0%: CPT

## 2024-10-21 PROCEDURE — 3008F BODY MASS INDEX DOCD: CPT

## 2024-10-21 PROCEDURE — 93880 EXTRACRANIAL BILAT STUDY: CPT

## 2024-10-21 PROCEDURE — 82306 VITAMIN D 25 HYDROXY: CPT

## 2024-10-21 PROCEDURE — 1036F TOBACCO NON-USER: CPT

## 2024-10-21 PROCEDURE — 80048 BASIC METABOLIC PNL TOTAL CA: CPT

## 2024-10-21 PROCEDURE — 3048F LDL-C <100 MG/DL: CPT

## 2024-10-21 PROCEDURE — 86850 RBC ANTIBODY SCREEN: CPT

## 2024-10-21 PROCEDURE — 4010F ACE/ARB THERAPY RXD/TAKEN: CPT

## 2024-10-21 PROCEDURE — 85027 COMPLETE CBC AUTOMATED: CPT

## 2024-10-21 PROCEDURE — 86900 BLOOD TYPING SEROLOGIC ABO: CPT

## 2024-10-21 PROCEDURE — 93880 EXTRACRANIAL BILAT STUDY: CPT | Performed by: SURGERY

## 2024-10-21 PROCEDURE — 87081 CULTURE SCREEN ONLY: CPT | Mod: WESLAB

## 2024-10-21 PROCEDURE — 31575 DIAGNOSTIC LARYNGOSCOPY: CPT

## 2024-10-21 RX ORDER — CHLORHEXIDINE GLUCONATE ORAL RINSE 1.2 MG/ML
SOLUTION DENTAL
Qty: 473 ML | Refills: 0 | Status: SHIPPED | OUTPATIENT
Start: 2024-10-21

## 2024-10-21 ASSESSMENT — DUKE ACTIVITY SCORE INDEX (DASI)
CAN YOU WALK INDOORS, SUCH AS AROUND YOUR HOUSE: YES
CAN YOU DO LIGHT WORK AROUND THE HOUSE LIKE DUSTING OR WASHING DISHES: YES
CAN YOU TAKE CARE OF YOURSELF (EAT, DRESS, BATHE, OR USE TOILET): YES
CAN YOU PARTICIPATE IN STRENOUS SPORTS LIKE SWIMMING, SINGLES TENNIS, FOOTBALL, BASKETBALL, OR SKIING: YES
CAN YOU HAVE SEXUAL RELATIONS: YES
CAN YOU PARTICIPATE IN MODERATE RECREATIONAL ACTIVITIES LIKE GOLF, BOWLING, DANCING, DOUBLES TENNIS OR THROWING A BASEBALL OR FOOTBALL: YES
CAN YOU DO HEAVY WORK AROUND THE HOUSE LIKE SCRUBBING FLOORS OR LIFTING AND MOVING HEAVY FURNITURE: YES
CAN YOU WALK A BLOCK OR TWO ON LEVEL GROUND: YES
CAN YOU DO MODERATE WORK AROUND THE HOUSE LIKE VACUUMING, SWEEPING FLOORS OR CARRYING GROCERIES: YES
CAN YOU CLIMB A FLIGHT OF STAIRS OR WALK UP A HILL: YES
CAN YOU RUN A SHORT DISTANCE: YES
TOTAL_SCORE: 58.2
CAN YOU DO YARD WORK LIKE RAKING LEAVES, WEEDING OR PUSHING A MOWER: YES
DASI METS SCORE: 9.9

## 2024-10-21 ASSESSMENT — PAIN - FUNCTIONAL ASSESSMENT: PAIN_FUNCTIONAL_ASSESSMENT: 0-10

## 2024-10-21 ASSESSMENT — ENCOUNTER SYMPTOMS
NAUSEA: 1
NEUROLOGICAL NEGATIVE: 1
EYES NEGATIVE: 1
RESPIRATORY NEGATIVE: 1
CONSTITUTIONAL NEGATIVE: 1
PSYCHIATRIC NEGATIVE: 1
CARDIOVASCULAR NEGATIVE: 1
MUSCULOSKELETAL NEGATIVE: 1

## 2024-10-21 ASSESSMENT — PAIN SCALES - GENERAL: PAINLEVEL_OUTOF10: 0 - NO PAIN

## 2024-10-21 NOTE — CPM/PAT H&P
CPM/PAT Evaluation       Name: Clara Rashid (Clara Rashid)  /Age: 1960/63 y.o.     In-Person       Chief Complaint: Neoplasm of uncertain behavior of lung     HPI  A 63-year-old female with neoplasm of uncertain behavior of lung.  History of enlarging left lower lobe lung nodule on CT of the chest.  History of bladder cancer  s/p TURBT x 2.  Medical history significant for diabetes type 2, hypertension, hyperlipidemia, left carotid bruit, COPD/pulmonary nodule, remote smoker.  Recent PET scan done 2024 showed: Hypermetabolic perifissural left lower lobe nodule suspicious for malignancy. 2. Slightly asymmetric increased FDG avidity in the right tongue base as well as a hypermetabolic focus in the right level 2 cervical cathy region. Although findings may be reactive in nature, direct visualization is recommended to exclude a neoplastic process. 3. No FDG avid cathy or distant metastatic disease. Minimally FDG avid bilateral axillary lymph nodes are likely reactive. 4. Nonspecific hypermetabolic cutaneous thickening in the right greater than left anterolateral chest. Correlate with physical exam.  Denies fever, chills, nausea, cough, chest pain, shortness of breath, syncope or unintentional weight loss.  She is scheduled for robotic assisted left lung wedge resection.    Past Medical History:   Diagnosis Date    Bladder cancer (Multi)     Hx antineoplastic chemo     Personal history of other diseases of the respiratory system 2013    History of acute bronchitis    Personal history of other diseases of the respiratory system 2013    Personal history of asthma       Past Surgical History:   Procedure Laterality Date    CYSTOSCOPY      TRANSURETHRAL RESECTION OF BLADDER TUMOR  2022    x 2         Allergies   Allergen Reactions    Bactrim [Sulfamethoxazole-Trimethoprim] Hives and Itching       Current Outpatient Medications   Medication Sig Dispense Refill    acetaminophen  (Tylenol) 500 mg tablet Take 2 tablets (1,000 mg) by mouth every 6 hours if needed for mild pain (1 - 3).      albuterol (Proventil HFA) 90 mcg/actuation inhaler Inhale 2 puffs every 4 hours if needed for wheezing or shortness of breath. 6.7 g 5    dulaglutide 3 mg/0.5 mL pen injector Inject 3 mg under the skin 1 (one) time per week. (Patient taking differently: Inject 3 mg under the skin 1 (one) time per week. WEDNESDAY) 6 mL 1    ergocalciferol (Vitamin D-2) 1.25 MG (40201 UT) capsule Take 1 capsule (50,000 Units) by mouth 1 (one) time per week. (Patient taking differently: Take 1 capsule (50,000 Units) by mouth 1 (one) time per week. TUESDAYS) 12 capsule 0    lisinopril 5 mg tablet Take 1 tablet (5 mg) by mouth once daily. 90 tablet 1    metFORMIN  mg 24 hr tablet Take 1 tablet (500 mg) by mouth 2 times a day. 180 tablet 1    psyllium husk (METAMUCIL ORAL) Take by mouth.      rosuvastatin (Crestor) 5 mg tablet Take 1 tablet (5 mg) by mouth once daily. 90 tablet 1    Wixela Inhub 500-50 mcg/dose diskus inhaler Inhale 1 puff 2 times a day.      chlorhexidine (Peridex) 0.12 % solution Use cap to measure 15 mL.  Swish/gargle mouthwash for at least 30 seconds.  Do not swallow.  Use night before surgery after brushing teeth and morning of surgery after brushing teeth. 473 mL 0     No current facility-administered medications for this visit.          Review of Systems   Constitutional: Negative.    HENT: Negative.     Eyes: Negative.         Glasses   Respiratory: Negative.     Cardiovascular: Negative.    Gastrointestinal:  Positive for nausea.   Genitourinary: Negative.    Musculoskeletal: Negative.    Skin: Negative.    Neurological: Negative.    Psychiatric/Behavioral: Negative.          Physical Exam  Vitals reviewed.   HENT:      Head: Normocephalic and atraumatic.      Mouth/Throat:      Mouth: Mucous membranes are moist.   Eyes:      Pupils: Pupils are equal, round, and reactive to light.      Comments:  "glasses   Neck:      Vascular: Carotid bruit (left) present.   Cardiovascular:      Rate and Rhythm: Normal rate and regular rhythm.   Pulmonary:      Effort: Pulmonary effort is normal.      Breath sounds: Normal breath sounds.   Abdominal:      Palpations: Abdomen is soft.   Musculoskeletal:         General: Normal range of motion.      Cervical back: Normal range of motion.   Skin:     General: Skin is warm and dry.   Neurological:      Mental Status: She is alert and oriented to person, place, and time.   Psychiatric:         Mood and Affect: Mood normal.          PAT AIRWAY:   Airway:     Mallampati::  II    Neck ROM::  Full      /68   Pulse 80   Temp 37 °C (98.6 °F) (Temporal)   Ht 1.6 m (5' 3\")   Wt 77 kg (169 lb 12.8 oz)   SpO2 98%   BMI 30.08 kg/m²       Stop Bang Score 2   CHADS: 4.0%  DASI risk score: 58.2  METS: 9.9  RCRI:0.9%  ASA: III  ARISCAT:1.6%  EKG done 8/12/2024  Carotid ultrasound 3/4/2022 less than 50% stenosis in right and left carotid arteries  Hemoglobin A1c 5.9 on 8/12/2024  PAT orders CBC, BMP, MRSA, type and screen, carotid US    Assessment and Plan:      Neoplasm of uncertain behavior of lung Plan: Robotic assisted left lung wedge resection  Diabetes type 2-takes metformin, Trulicity last dose 10/16/2024-hemoglobin A1c 5.9 on 8/12/2024  Hypertension managed with lisinopril  Hyperlipidemia takes rosuvastatin  COPD uses Wixela and albuterol inhaler-as needed  History of left carotid bruit-carotid ultrasound 3/4/2022 less than 50% stenosis in right and left carotid arteries-Repeat carotid US ordered   BMI 30.08          "

## 2024-10-21 NOTE — PROGRESS NOTES
Reason For Consult  PET scan right side base of tongue     HISTORY OF PRESENT ILLNESS:  This is a request for consultation from Dr. Phillip Garrido for Clara Rashid, who is a 63 y.o. female presenting for an initial visit for laryngoscopy post PET scan. The patient is scheduled for Robotic assisted left lower lobe wedge resection possible lobectomy with mediastinal lymphadenectomy on 10/28/24. The patient denies any voice changes, Patient denies any throat discomfort or pain. The patient denies any shortness of breath. Patient reports she is tolerating solids, liquids, and pills. Denies symptoms of acid reflux. Patient reports prior smoking history, quit roughly 2 years ago.     Past Medical History  She has a past medical history of Bladder cancer (Multi), antineoplastic chemo (2022), Personal history of other diseases of the respiratory system (07/08/2013), and Personal history of other diseases of the respiratory system (07/08/2013).  Surgical History  She has a past surgical history that includes Transurethral resection of bladder tumor (2022) and Cystoscopy (2024).     Social History  She reports that she quit smoking about 2 years ago. Her smoking use included cigarettes. She started smoking about 45 years ago. She has a 63.8 pack-year smoking history. She has been exposed to tobacco smoke. She has never used smokeless tobacco. She reports current alcohol use of about 2.0 standard drinks of alcohol per week. She reports that she does not currently use drugs after having used the following drugs: Marijuana.    Allergies  Bactrim [sulfamethoxazole-trimethoprim]    Review of Systems  All 10 systems were reviewed and negative except for above.      Physical Exam  CONSTITUTIONAL: Well developed, well nourished.    VOICE: normal  RESPIRATION: Breathing comfortably, no stridor.    NEURO: Alert and oriented x3, cranial nerves II-XII intact and symmetric bilaterally.    EARS: Normal external ears, external  "auditory canals, normal hearing to conversational voice.    NOSE: External nose midline, anterior rhinoscopy is normal with limited visualization to the anterior aspect of the interior turbinates. No lesions noted.     ORAL CAVITY/OROPHARYNX/LIPS: Normal mucous membranes, normal floor of mouth/tongue/OP, no masses or lesions are noted.    SKIN: Neck skin is normal.  PSYCH: Alert and oriented with appropriate mood and affect.        Last Recorded Vitals  Height 1.6 m (5' 3\"), weight 76.7 kg (169 lb).    Procedure  PROCEDURE NOTE:  Recommended flexible laryngoscopy/stroboscopy.  Risks, benefits,  and alternatives were explained.  They wish to proceed and provide verbal consent.     PROCEDURE:  Flexible Laryngoscopy, CPT 21318     INDICATIONS: Inability to tolerate mirror exam or abnormal findings on mirror, Flexible Laryngoscopy/Stroboscopy performed to assess one of the followin. Diagnosis of symptomatic disorder involving the voice, swallow, upper aerodigestive tract, including MELISSA disorders, or  2. Preoperative evaluation of vocal cord function for individuals undergoing surgery where the RLN or vagus nerves are at risk of injury, or  3. Further evaluation of abnormalities of the upper aerodigestive tract discovered by another modality, such as CT, MRI, bronchoscopy or EGD    Description of Procedure:    After adequate afrin and lidocaine spray, I advanced the endoscope.  Visualization of the nasopharynx, vallecula, posterior pharyngeal walls, pyriform, epiglottis and post cricoid areas was unremarkable.  The following laryngeal findings were noted:    vocal cord movement was normal bilaterally  closure was complete  Mucosal wave was not assessed   Compression was  not increased AP or FVC   edema was  none  interarytenoid edema none  lesions were none  the subglottis was widely patent  Pharyngeal wall squeeze was normal    Procedure well tolerated.   Relevant Results    PET Scan 24- Slightly asymmetric " FDG avidity is seen in the right tongue base. A hypermetabolic focus is seen in the right level 2 cervical cathy region with max SUV of 3.7.      ASSESSMENT AND PLAN:   This is an initial visit for a laryngoscopy exam post PET scan with clinical findings notable for no masses or lesions identified in larynx. Good vocal cord movement and closure. Discussed PET scan and scope exam with Dr. Carias.    We discussed the treatment options to include, medical and surgical options.  We have decided to proceed as follows:   Discussed reviewed PET scan and scope with Dr. Carias.  Spoke with Dr. Phillip Garrido, and discussed  possible CT with fine cuts through larynx. Patient to have her pulmonology surgery first, and if any further concern from Dr. Phillip Garrido post surgery, will see patient back in office.  No masses or lesions identified on scope exam.

## 2024-10-21 NOTE — PREPROCEDURE INSTRUCTIONS
Medication List            Accurate as of October 21, 2024  7:20 AM. Always use your most recent med list.                acetaminophen 500 mg tablet  Commonly known as: Tylenol  Medication Adjustments for Surgery: Take/Use as prescribed     albuterol 90 mcg/actuation inhaler  Commonly known as: Proventil HFA  Inhale 2 puffs every 4 hours if needed for wheezing or shortness of breath.  Medication Adjustments for Surgery: Take on the morning of surgery  Notes to patient: BRING WITH YOU DAY OF SURGERY     dulaglutide 3 mg/0.5 mL pen injector  Inject 3 mg under the skin 1 (one) time per week.  Additional Medication Adjustments for Surgery: Take last dose 7 days before surgery  Notes to patient: LAST DOSE WAS 10/16/24     ergocalciferol 1.25 MG (55031 UT) capsule  Commonly known as: Vitamin D-2  Take 1 capsule (50,000 Units) by mouth 1 (one) time per week.  Additional Medication Adjustments for Surgery: Take last dose 7 days before surgery     lisinopril 5 mg tablet  Take 1 tablet (5 mg) by mouth once daily.  Medication Adjustments for Surgery: Do Not take on the morning of surgery     METAMUCIL ORAL  Medication Adjustments for Surgery: Do Not take on the morning of surgery     metFORMIN  mg 24 hr tablet  Commonly known as: Glucophage-XR  Take 1 tablet (500 mg) by mouth 2 times a day.  Medication Adjustments for Surgery: Do Not take on the morning of surgery     rosuvastatin 5 mg tablet  Commonly known as: Crestor  Take 1 tablet (5 mg) by mouth once daily.  Medication Adjustments for Surgery: Take on the morning of surgery     Wixela Inhub 500-50 mcg/dose diskus inhaler  Generic drug: fluticasone propion-salmeteroL  Medication Adjustments for Surgery: Take on the morning of surgery                Preoperative Fasting Guidelines    Why must I stop eating and drinking near surgery time?  With sedation, food or liquid in your stomach can enter your lungs causing serious complications  Increases nausea and  vomiting    When do I need to stop eating and drinking before my surgery?  Do not eat any food or drink any liquids after midnight the night before your surgery/procedure.  You may have sips of water to take medications.    PAT DISCHARGE INSTRUCTIONS    Please call the Same Day Surgery (SDS) Department of the hospital where your procedure will be performed after 2:00 PM the day before your surgery. If you are scheduled on a Monday, or a Tuesday following a Monday holiday, you will need to call on the last business day prior to your surgery.    [facility]  [address] [place] [address] [phone]  [facility]  [address] [facility], [address] [phone]  [facility]  [address] [place] [address] [phone]    Please let your surgeon know if:      You develop any open sores, shingles, burning or painful urination as these may increase your risk of an infection.   You no longer wish to have the surgery.   Any other personal circumstances change that may lead to the need to cancel or defer this surgery-such as being sick or getting admitted to any hospital within one week of your planned procedure.    Your contact details change, such as a change of address or phone number.    Starting now:     Please DO NOT drink alcohol or smoke for 24 hours before surgery. It is well known that quitting smoking can make a huge difference to your health and recovery from surgery. The longer you abstain from smoking, the better your chances of a healthy recovery. If you need help with quitting, call 1-800-QUIT-NOW to be connected to a trained counselor who will discuss the best methods to help you quit.     Before your surgery:    Please stop all supplements 7 days prior to surgery. Or as directed by your surgeon.   Please stop taking NSAID pain medicine such as  Advil and Motrin 7 days before surgery.    If you develop any fever, cough, cold, rashes, cuts, scratches, scrapes, urinary symptoms or infection anywhere on your body (including teeth and gums) prior to surgery, please call your surgeon’s office as soon as possible. This may require treatment to reduce the chance of cancellation on the day of surgery.    The day before your surgery:   DIET- Please follow the diet instructions at the top of your packet.   Get a good night’s rest.  Use the special soap for bathing if you have been instructed to use one.    Scheduled surgery times may change and you will be notified if this occurs - please check your personal voicemail for any updates.     On the morning of surgery:   Wear comfortable, loose fitting clothes which open in the front. Please do not wear moisturizers, creams, lotions, makeup or perfume.    Please bring with you to surgery:   Photo ID and insurance card   Current list of medicines and allergies   Pacemaker/ Defibrillator/Heart stent cards   CPAP machine and mask    Slings/ splints/ crutches   A copy of your complete advanced directive/DHPOA.    Please do NOT bring with you to surgery:   All jewelry and valuables should be left at home.   Prosthetic devices such as contact lenses, hearing aids, dentures, eyelash extensions, hairpins and body piercings must be removed prior to going in to the surgical suite.    After outpatient surgery:   A responsible adult MUST accompany you at the time of discharge and stay with you for 24 hours after your surgery. You may NOT drive yourself home after surgery.    Do not drive, operate machinery, make critical decisions or do activities that require co-ordination or balance until after a night’s sleep.   Do not drink alcoholic beverages for 24 hours.   Instructions for resuming your medications will be provided by your surgeon.    CALL YOUR DOCTOR AFTER SURGERY IF YOU HAVE:     Chills and/or a fever of 101° F or higher.     Redness, swelling, pus or drainage from your surgical wound or a bad smell from the wound.    Lightheadedness, fainting or confusion.    Persistent vomiting (throwing up) and are not able to eat or drink for 12 hours.    Three or more loose, watery bowel movements in 24 hours (diarrhea).   Difficulty or pain while urinating( after non-urological surgery)    Pain and swelling in your legs, especially if it is only on one side.    Difficulty breathing or are breathing faster than normal.    Any new concerning symptoms.      Patient Information: Pre-Operative Infection Prevention Measures     Why did I have my nose, under my arms, and groin swabbed?  The purpose of the swab is to identify Staphylococcus aureus inside your nose or on your skin.  The swab was sent to the laboratory for culture.  A positive swab/culture for Staphylococcus aureus is called colonization or carriage.      What is Staphylococcus aureus?  Staphylococcus aureus, also known as “staph”, is a germ found on the skin or in the nose of healthy people.  Sometimes Staphylococcus aureus can get into the body and cause an infection.  This can be minor (such as pimples, boils, or other skin problems).  It might also be serious (such as a blood infection, pneumonia, or a surgical site infection).    What is Staphylococcus aureus colonization or carriage?  Colonization or carriage means that a person has the germ but is not sick from it.  These bacteria can be spread on the hands or when breathing or sneezing.    How is Staphylococcus aureus spread?  It is most often spread by close contact with a person or item that carries it.    What happens if my culture is positive for Staphylococcus aureus?  Your doctor/medical team will use this information to guide any antibiotic treatment which may be necessary.  Regardless of the culture results, we will clean the inside of your nose with a betadine swab just before you have your surgery.      Will I get an  infection if I have Staphylococcus aureus in my nose or on my skin?  Anyone can get an infection with Staphylococcus aureus.  However, the best way to reduce your risk of infection is to follow the instructions provided to you for the use of your CHG soap and dental rinse.        Patient Information: Oral/Dental Rinse    What is oral/dental rinse?   It is a mouthwash. It is a way of cleaning the mouth with a germ-killing solution before your surgery.  The solution contains chlorhexidine, commonly known as CHG.   It is used inside the mouth to kill a bacteria known as Staphylococcus aureus.  Let your doctor know if you are allergic to Chlorhexidine.    Why do I need to use CHG oral/dental rinse?  The CHG oral/dental rinse helps to kill a bacteria in your mouth known as Staphylococcus aureus.     This reduces the risk of infection at the surgical site.      Using your CHG oral/dental rinse  STEPS:  Use your CHG oral/dental rinse after you brush your teeth the night before (at bedtime) and the morning of your surgery.  Follow all directions on your prescription label.    Use the cap on the container to measure 15ml   Swish (gargle if you can) the mouthwash in your mouth for at least 30 seconds, (do not swallow) and spit out  After you use your CHG rinse, do not rinse your mouth with water, drink or eat.  Please refer to the prescription label for the appropriate time to resume oral intake      What side effects might I have using the CHG oral/dental rinse?  CHG rinse will stick to plaque on the teeth.  Brush and floss just before use.  Teeth brushing will help avoid staining of plaque during use.      Patient Information: Home Preoperative Antibacterial Shower      What is a home preoperative antibacterial shower?  This shower is a way of cleaning the skin with a germ-killing solution before surgery.  The solution contains chlorhexidine, commonly known as CHG.  CHG is a skin cleanser with germ-killing ability.  Let  your doctor know if you are allergic to chlorhexidine.    Why do I need to take a preoperative antibacterial shower?  Skin is not sterile.  It is best to try to make your skin as free of germs as possible before surgery.  Proper cleansing with a germ-killing soap before surgery can lower the number of germs on your skin.  This helps to reduce the risk of infection at the surgical site.  Following the instructions listed below will help you prepare your skin for surgery.      How do I use the solution?  Steps:  Begin using your CHG soap 5 days before your scheduled surgery on ___START WASH 10/24/24____.    First, wash and rinse your hair using the CHG soap. Keep CHG soap away from ear canals and eyes.  Rinse completely, do not condition.  Hair extensions should be removed.  Wash your face with your normal soap and rinse.    Apply the CHG solution to a clean wet washcloth.  Turn the water off or move away from the water spray to avoid premature rinsing of the CHG soap as you are applying.   Firmly lather your entire body from the neck down.  Do not use on your face.  Pay special attention to the area(s) where your incision(s) will be located unless they are on your face.  Avoid scrubbing your skin too hard.  The important point is to have the CHG soap sit on your skin for 3 minutes.    When the 3 minutes are up, turn on the water and rinse the CHG solution off your body completely.   DO NOT wash with regular soap after you have used the CHG soap solution  Pat yourself dry with a clean, freshly-laundered towel.  DO NOT apply powders, deodorants, or lotions.  Dress in clean, freshly laundered nightclothes.    Be sure to sleep with clean, freshly laundered sheets.  Be aware that CHG will cause stains on fabrics; if you wash them with bleach after use.  Rinse your washcloth and other linens that have contact with CHG completely.  Use only non-chlorine detergents to launder the items used.   The morning of surgery is the  fifth day.  Repeat the above steps and dress in clean comfortable clothing     Whom should I contact if I have any questions regarding the use of CHG soap?  Call the [facility] at [phone] if you have any questions.

## 2024-10-23 LAB — STAPHYLOCOCCUS SPEC CULT: NORMAL

## 2024-10-27 ENCOUNTER — ANESTHESIA EVENT (OUTPATIENT)
Dept: OPERATING ROOM | Facility: HOSPITAL | Age: 64
End: 2024-10-27
Payer: COMMERCIAL

## 2024-10-28 ENCOUNTER — ANESTHESIA (OUTPATIENT)
Dept: OPERATING ROOM | Facility: HOSPITAL | Age: 64
End: 2024-10-28
Payer: COMMERCIAL

## 2024-10-28 ENCOUNTER — HOSPITAL ENCOUNTER (INPATIENT)
Facility: HOSPITAL | Age: 64
LOS: 2 days | Discharge: HOME | End: 2024-10-30
Attending: THORACIC SURGERY (CARDIOTHORACIC VASCULAR SURGERY) | Admitting: THORACIC SURGERY (CARDIOTHORACIC VASCULAR SURGERY)
Payer: COMMERCIAL

## 2024-10-28 ENCOUNTER — APPOINTMENT (OUTPATIENT)
Dept: RADIOLOGY | Facility: HOSPITAL | Age: 64
End: 2024-10-28
Payer: COMMERCIAL

## 2024-10-28 DIAGNOSIS — Z98.890 STATUS POST LUNG SURGERY: ICD-10-CM

## 2024-10-28 DIAGNOSIS — L76.82 INCISIONAL PAIN: ICD-10-CM

## 2024-10-28 DIAGNOSIS — D38.1 NEOPLASM OF UNCERTAIN BEHAVIOR OF LUNG: Primary | ICD-10-CM

## 2024-10-28 LAB
ABO GROUP (TYPE) IN BLOOD: NORMAL
ANION GAP SERPL CALCULATED.3IONS-SCNC: 14 MMOL/L (ref 10–20)
BASOPHILS # BLD AUTO: 0.07 X10*3/UL (ref 0–0.1)
BASOPHILS NFR BLD AUTO: 0.4 %
BUN SERPL-MCNC: 14 MG/DL (ref 6–23)
CALCIUM SERPL-MCNC: 9.2 MG/DL (ref 8.6–10.3)
CARDIAC TROPONIN I PNL SERPL HS: 20 NG/L (ref 0–13)
CHLORIDE SERPL-SCNC: 104 MMOL/L (ref 98–107)
CO2 SERPL-SCNC: 24 MMOL/L (ref 21–32)
CREAT SERPL-MCNC: 0.75 MG/DL (ref 0.5–1.05)
EGFRCR SERPLBLD CKD-EPI 2021: 90 ML/MIN/1.73M*2
EOSINOPHIL # BLD AUTO: 0.01 X10*3/UL (ref 0–0.7)
EOSINOPHIL NFR BLD AUTO: 0.1 %
ERYTHROCYTE [DISTWIDTH] IN BLOOD BY AUTOMATED COUNT: 12 % (ref 11.5–14.5)
GLUCOSE BLD MANUAL STRIP-MCNC: 151 MG/DL (ref 74–99)
GLUCOSE BLD MANUAL STRIP-MCNC: 224 MG/DL (ref 74–99)
GLUCOSE BLD MANUAL STRIP-MCNC: 254 MG/DL (ref 74–99)
GLUCOSE SERPL-MCNC: 187 MG/DL (ref 74–99)
HCT VFR BLD AUTO: 43.6 % (ref 36–46)
HGB BLD-MCNC: 14.3 G/DL (ref 12–16)
IMM GRANULOCYTES # BLD AUTO: 0.2 X10*3/UL (ref 0–0.7)
IMM GRANULOCYTES NFR BLD AUTO: 1.3 % (ref 0–0.9)
LYMPHOCYTES # BLD AUTO: 1.03 X10*3/UL (ref 1.2–4.8)
LYMPHOCYTES NFR BLD AUTO: 6.4 %
MAGNESIUM SERPL-MCNC: 1.71 MG/DL (ref 1.6–2.4)
MCH RBC QN AUTO: 32.7 PG (ref 26–34)
MCHC RBC AUTO-ENTMCNC: 32.8 G/DL (ref 32–36)
MCV RBC AUTO: 100 FL (ref 80–100)
MONOCYTES # BLD AUTO: 0.65 X10*3/UL (ref 0.1–1)
MONOCYTES NFR BLD AUTO: 4.1 %
NEUTROPHILS # BLD AUTO: 14.03 X10*3/UL (ref 1.2–7.7)
NEUTROPHILS NFR BLD AUTO: 87.7 %
NRBC BLD-RTO: 0 /100 WBCS (ref 0–0)
PHOSPHATE SERPL-MCNC: 3.5 MG/DL (ref 2.5–4.9)
PLATELET # BLD AUTO: 275 X10*3/UL (ref 150–450)
POTASSIUM SERPL-SCNC: 4.5 MMOL/L (ref 3.5–5.3)
RBC # BLD AUTO: 4.37 X10*6/UL (ref 4–5.2)
RH FACTOR (ANTIGEN D): NORMAL
SODIUM SERPL-SCNC: 137 MMOL/L (ref 136–145)
WBC # BLD AUTO: 16 X10*3/UL (ref 4.4–11.3)

## 2024-10-28 PROCEDURE — 94762 N-INVAS EAR/PLS OXIMTRY CONT: CPT

## 2024-10-28 PROCEDURE — 8E0W4CZ ROBOTIC ASSISTED PROCEDURE OF TRUNK REGION, PERCUTANEOUS ENDOSCOPIC APPROACH: ICD-10-PCS | Performed by: THORACIC SURGERY (CARDIOTHORACIC VASCULAR SURGERY)

## 2024-10-28 PROCEDURE — 80048 BASIC METABOLIC PNL TOTAL CA: CPT

## 2024-10-28 PROCEDURE — 0BJ08ZZ INSPECTION OF TRACHEOBRONCHIAL TREE, VIA NATURAL OR ARTIFICIAL OPENING ENDOSCOPIC: ICD-10-PCS | Performed by: THORACIC SURGERY (CARDIOTHORACIC VASCULAR SURGERY)

## 2024-10-28 PROCEDURE — A32666 PR THORACOSCOPY W/THERA WEDGE RESEXN INITIAL UNILAT

## 2024-10-28 PROCEDURE — 3600000017 HC OR TIME - EACH INCREMENTAL 1 MINUTE - PROCEDURE LEVEL SIX: Performed by: THORACIC SURGERY (CARDIOTHORACIC VASCULAR SURGERY)

## 2024-10-28 PROCEDURE — 32669 THORACOSCOPY REMOVE SEGMENT: CPT | Performed by: THORACIC SURGERY (CARDIOTHORACIC VASCULAR SURGERY)

## 2024-10-28 PROCEDURE — 85025 COMPLETE CBC W/AUTO DIFF WBC: CPT

## 2024-10-28 PROCEDURE — 3600000018 HC OR TIME - INITIAL BASE CHARGE - PROCEDURE LEVEL SIX: Performed by: THORACIC SURGERY (CARDIOTHORACIC VASCULAR SURGERY)

## 2024-10-28 PROCEDURE — 2500000005 HC RX 250 GENERAL PHARMACY W/O HCPCS: Performed by: PHYSICIAN ASSISTANT

## 2024-10-28 PROCEDURE — 71045 X-RAY EXAM CHEST 1 VIEW: CPT | Performed by: RADIOLOGY

## 2024-10-28 PROCEDURE — C1889 IMPLANT/INSERT DEVICE, NOC: HCPCS | Performed by: THORACIC SURGERY (CARDIOTHORACIC VASCULAR SURGERY)

## 2024-10-28 PROCEDURE — 7100000002 HC RECOVERY ROOM TIME - EACH INCREMENTAL 1 MINUTE: Performed by: THORACIC SURGERY (CARDIOTHORACIC VASCULAR SURGERY)

## 2024-10-28 PROCEDURE — 2500000001 HC RX 250 WO HCPCS SELF ADMINISTERED DRUGS (ALT 637 FOR MEDICARE OP): Performed by: PHYSICIAN ASSISTANT

## 2024-10-28 PROCEDURE — 0BBJ4ZZ EXCISION OF LEFT LOWER LUNG LOBE, PERCUTANEOUS ENDOSCOPIC APPROACH: ICD-10-PCS | Performed by: THORACIC SURGERY (CARDIOTHORACIC VASCULAR SURGERY)

## 2024-10-28 PROCEDURE — 71045 X-RAY EXAM CHEST 1 VIEW: CPT

## 2024-10-28 PROCEDURE — A32666 PR THORACOSCOPY W/THERA WEDGE RESEXN INITIAL UNILAT: Performed by: ANESTHESIOLOGY

## 2024-10-28 PROCEDURE — 2720000007 HC OR 272 NO HCPCS: Performed by: THORACIC SURGERY (CARDIOTHORACIC VASCULAR SURGERY)

## 2024-10-28 PROCEDURE — 9420000001 HC RT PATIENT EDUCATION 5 MIN

## 2024-10-28 PROCEDURE — 2500000001 HC RX 250 WO HCPCS SELF ADMINISTERED DRUGS (ALT 637 FOR MEDICARE OP)

## 2024-10-28 PROCEDURE — 2500000004 HC RX 250 GENERAL PHARMACY W/ HCPCS (ALT 636 FOR OP/ED): Performed by: ANESTHESIOLOGY

## 2024-10-28 PROCEDURE — 82947 ASSAY GLUCOSE BLOOD QUANT: CPT

## 2024-10-28 PROCEDURE — 84484 ASSAY OF TROPONIN QUANT: CPT

## 2024-10-28 PROCEDURE — 84100 ASSAY OF PHOSPHORUS: CPT

## 2024-10-28 PROCEDURE — 2500000002 HC RX 250 W HCPCS SELF ADMINISTERED DRUGS (ALT 637 FOR MEDICARE OP, ALT 636 FOR OP/ED)

## 2024-10-28 PROCEDURE — 36415 COLL VENOUS BLD VENIPUNCTURE: CPT

## 2024-10-28 PROCEDURE — 3700000002 HC GENERAL ANESTHESIA TIME - EACH INCREMENTAL 1 MINUTE: Performed by: THORACIC SURGERY (CARDIOTHORACIC VASCULAR SURGERY)

## 2024-10-28 PROCEDURE — 2500000004 HC RX 250 GENERAL PHARMACY W/ HCPCS (ALT 636 FOR OP/ED)

## 2024-10-28 PROCEDURE — 94640 AIRWAY INHALATION TREATMENT: CPT

## 2024-10-28 PROCEDURE — 7100000001 HC RECOVERY ROOM TIME - INITIAL BASE CHARGE: Performed by: THORACIC SURGERY (CARDIOTHORACIC VASCULAR SURGERY)

## 2024-10-28 PROCEDURE — 32674 THORACOSCOPY LYMPH NODE EXC: CPT | Performed by: THORACIC SURGERY (CARDIOTHORACIC VASCULAR SURGERY)

## 2024-10-28 PROCEDURE — 36415 COLL VENOUS BLD VENIPUNCTURE: CPT | Performed by: THORACIC SURGERY (CARDIOTHORACIC VASCULAR SURGERY)

## 2024-10-28 PROCEDURE — 83735 ASSAY OF MAGNESIUM: CPT

## 2024-10-28 PROCEDURE — 2020000001 HC ICU ROOM DAILY

## 2024-10-28 PROCEDURE — 2500000004 HC RX 250 GENERAL PHARMACY W/ HCPCS (ALT 636 FOR OP/ED): Performed by: THORACIC SURGERY (CARDIOTHORACIC VASCULAR SURGERY)

## 2024-10-28 PROCEDURE — 3700000001 HC GENERAL ANESTHESIA TIME - INITIAL BASE CHARGE: Performed by: THORACIC SURGERY (CARDIOTHORACIC VASCULAR SURGERY)

## 2024-10-28 PROCEDURE — 07B74ZX EXCISION OF THORAX LYMPHATIC, PERCUTANEOUS ENDOSCOPIC APPROACH, DIAGNOSTIC: ICD-10-PCS | Performed by: THORACIC SURGERY (CARDIOTHORACIC VASCULAR SURGERY)

## 2024-10-28 PROCEDURE — 99222 1ST HOSP IP/OBS MODERATE 55: CPT

## 2024-10-28 RX ORDER — ACETAMINOPHEN 325 MG/1
650 TABLET ORAL EVERY 6 HOURS PRN
Status: DISCONTINUED | OUTPATIENT
Start: 2024-10-28 | End: 2024-10-30 | Stop reason: HOSPADM

## 2024-10-28 RX ORDER — NICOTINE POLACRILEX 2 MG/1
2 LOZENGE ORAL EVERY 2 HOUR PRN
Status: DISCONTINUED | OUTPATIENT
Start: 2024-10-28 | End: 2024-10-28

## 2024-10-28 RX ORDER — HYDROMORPHONE HYDROCHLORIDE 2 MG/ML
INJECTION, SOLUTION INTRAMUSCULAR; INTRAVENOUS; SUBCUTANEOUS AS NEEDED
Status: DISCONTINUED | OUTPATIENT
Start: 2024-10-28 | End: 2024-10-28

## 2024-10-28 RX ORDER — IPRATROPIUM BROMIDE AND ALBUTEROL SULFATE 2.5; .5 MG/3ML; MG/3ML
3 SOLUTION RESPIRATORY (INHALATION)
Status: DISCONTINUED | OUTPATIENT
Start: 2024-10-28 | End: 2024-10-28

## 2024-10-28 RX ORDER — LISINOPRIL 5 MG/1
5 TABLET ORAL DAILY
Status: DISCONTINUED | OUTPATIENT
Start: 2024-10-29 | End: 2024-10-30 | Stop reason: HOSPADM

## 2024-10-28 RX ORDER — OXYCODONE AND ACETAMINOPHEN 5; 325 MG/1; MG/1
1 TABLET ORAL EVERY 6 HOURS PRN
Status: DISCONTINUED | OUTPATIENT
Start: 2024-10-28 | End: 2024-10-28

## 2024-10-28 RX ORDER — LIDOCAINE HYDROCHLORIDE 20 MG/ML
INJECTION, SOLUTION INFILTRATION; PERINEURAL AS NEEDED
Status: DISCONTINUED | OUTPATIENT
Start: 2024-10-28 | End: 2024-10-28

## 2024-10-28 RX ORDER — FENTANYL CITRATE 50 UG/ML
INJECTION, SOLUTION INTRAMUSCULAR; INTRAVENOUS AS NEEDED
Status: DISCONTINUED | OUTPATIENT
Start: 2024-10-28 | End: 2024-10-28

## 2024-10-28 RX ORDER — ONDANSETRON HYDROCHLORIDE 2 MG/ML
INJECTION, SOLUTION INTRAVENOUS AS NEEDED
Status: DISCONTINUED | OUTPATIENT
Start: 2024-10-28 | End: 2024-10-28

## 2024-10-28 RX ORDER — HYDRALAZINE HYDROCHLORIDE 20 MG/ML
5 INJECTION INTRAMUSCULAR; INTRAVENOUS EVERY 30 MIN PRN
Status: DISCONTINUED | OUTPATIENT
Start: 2024-10-28 | End: 2024-10-28 | Stop reason: HOSPADM

## 2024-10-28 RX ORDER — GLYCOPYRROLATE 0.2 MG/ML
INJECTION INTRAMUSCULAR; INTRAVENOUS AS NEEDED
Status: DISCONTINUED | OUTPATIENT
Start: 2024-10-28 | End: 2024-10-28

## 2024-10-28 RX ORDER — PROPOFOL 10 MG/ML
INJECTION, EMULSION INTRAVENOUS AS NEEDED
Status: DISCONTINUED | OUTPATIENT
Start: 2024-10-28 | End: 2024-10-28

## 2024-10-28 RX ORDER — NALOXONE HYDROCHLORIDE 0.4 MG/ML
0.2 INJECTION, SOLUTION INTRAMUSCULAR; INTRAVENOUS; SUBCUTANEOUS EVERY 5 MIN PRN
Status: DISCONTINUED | OUTPATIENT
Start: 2024-10-28 | End: 2024-10-30 | Stop reason: HOSPADM

## 2024-10-28 RX ORDER — FLUTICASONE PROPIONATE AND SALMETEROL 500; 50 UG/1; UG/1
1 POWDER RESPIRATORY (INHALATION)
Status: DISCONTINUED | OUTPATIENT
Start: 2024-10-28 | End: 2024-10-30 | Stop reason: HOSPADM

## 2024-10-28 RX ORDER — TALC
3 POWDER (GRAM) TOPICAL NIGHTLY PRN
Status: DISCONTINUED | OUTPATIENT
Start: 2024-10-28 | End: 2024-10-30 | Stop reason: HOSPADM

## 2024-10-28 RX ORDER — OXYCODONE HYDROCHLORIDE 5 MG/1
5 TABLET ORAL EVERY 6 HOURS PRN
Status: DISCONTINUED | OUTPATIENT
Start: 2024-10-28 | End: 2024-10-30 | Stop reason: HOSPADM

## 2024-10-28 RX ORDER — ONDANSETRON HYDROCHLORIDE 2 MG/ML
4 INJECTION, SOLUTION INTRAVENOUS ONCE AS NEEDED
Status: DISCONTINUED | OUTPATIENT
Start: 2024-10-28 | End: 2024-10-28 | Stop reason: HOSPADM

## 2024-10-28 RX ORDER — INSULIN LISPRO 100 [IU]/ML
0-15 INJECTION, SOLUTION INTRAVENOUS; SUBCUTANEOUS
Status: DISCONTINUED | OUTPATIENT
Start: 2024-10-29 | End: 2024-10-28

## 2024-10-28 RX ORDER — ENOXAPARIN SODIUM 100 MG/ML
40 INJECTION SUBCUTANEOUS
Status: DISCONTINUED | OUTPATIENT
Start: 2024-10-28 | End: 2024-10-30 | Stop reason: HOSPADM

## 2024-10-28 RX ORDER — OXYCODONE HYDROCHLORIDE 5 MG/1
5 TABLET ORAL EVERY 4 HOURS PRN
Status: DISCONTINUED | OUTPATIENT
Start: 2024-10-28 | End: 2024-10-28 | Stop reason: HOSPADM

## 2024-10-28 RX ORDER — INSULIN LISPRO 100 [IU]/ML
0-15 INJECTION, SOLUTION INTRAVENOUS; SUBCUTANEOUS
Status: DISCONTINUED | OUTPATIENT
Start: 2024-10-28 | End: 2024-10-30 | Stop reason: HOSPADM

## 2024-10-28 RX ORDER — MAGNESIUM SULFATE HEPTAHYDRATE 40 MG/ML
2 INJECTION, SOLUTION INTRAVENOUS ONCE
Status: COMPLETED | OUTPATIENT
Start: 2024-10-28 | End: 2024-10-28

## 2024-10-28 RX ORDER — DIPHENHYDRAMINE HYDROCHLORIDE 50 MG/ML
12.5 INJECTION INTRAMUSCULAR; INTRAVENOUS ONCE AS NEEDED
Status: DISCONTINUED | OUTPATIENT
Start: 2024-10-28 | End: 2024-10-28 | Stop reason: HOSPADM

## 2024-10-28 RX ORDER — ROSUVASTATIN CALCIUM 10 MG/1
5 TABLET, COATED ORAL NIGHTLY
Status: DISCONTINUED | OUTPATIENT
Start: 2024-10-28 | End: 2024-10-30 | Stop reason: HOSPADM

## 2024-10-28 RX ORDER — MIDAZOLAM HYDROCHLORIDE 1 MG/ML
INJECTION, SOLUTION INTRAMUSCULAR; INTRAVENOUS AS NEEDED
Status: DISCONTINUED | OUTPATIENT
Start: 2024-10-28 | End: 2024-10-28

## 2024-10-28 RX ORDER — ONDANSETRON HYDROCHLORIDE 2 MG/ML
4 INJECTION, SOLUTION INTRAVENOUS EVERY 8 HOURS PRN
Status: DISCONTINUED | OUTPATIENT
Start: 2024-10-28 | End: 2024-10-30 | Stop reason: HOSPADM

## 2024-10-28 RX ORDER — CEFAZOLIN 1 G/1
INJECTION, POWDER, FOR SOLUTION INTRAVENOUS AS NEEDED
Status: DISCONTINUED | OUTPATIENT
Start: 2024-10-28 | End: 2024-10-28

## 2024-10-28 RX ORDER — ALBUTEROL SULFATE 0.83 MG/ML
3 SOLUTION RESPIRATORY (INHALATION) EVERY 4 HOURS PRN
Status: DISCONTINUED | OUTPATIENT
Start: 2024-10-28 | End: 2024-10-30 | Stop reason: HOSPADM

## 2024-10-28 RX ORDER — ALBUTEROL SULFATE 0.83 MG/ML
2.5 SOLUTION RESPIRATORY (INHALATION) ONCE AS NEEDED
Status: DISCONTINUED | OUTPATIENT
Start: 2024-10-28 | End: 2024-10-28 | Stop reason: HOSPADM

## 2024-10-28 RX ORDER — GUAIFENESIN 600 MG/1
1200 TABLET, EXTENDED RELEASE ORAL 2 TIMES DAILY
COMMUNITY

## 2024-10-28 RX ORDER — DOCUSATE SODIUM 100 MG/1
100 CAPSULE, LIQUID FILLED ORAL 2 TIMES DAILY
Status: DISCONTINUED | OUTPATIENT
Start: 2024-10-28 | End: 2024-10-30 | Stop reason: HOSPADM

## 2024-10-28 RX ORDER — ROCURONIUM BROMIDE 10 MG/ML
INJECTION, SOLUTION INTRAVENOUS AS NEEDED
Status: DISCONTINUED | OUTPATIENT
Start: 2024-10-28 | End: 2024-10-28

## 2024-10-28 RX ORDER — HYDROMORPHONE HYDROCHLORIDE 0.2 MG/ML
0.2 INJECTION INTRAMUSCULAR; INTRAVENOUS; SUBCUTANEOUS EVERY 5 MIN PRN
Status: DISCONTINUED | OUTPATIENT
Start: 2024-10-28 | End: 2024-10-28 | Stop reason: HOSPADM

## 2024-10-28 RX ORDER — ONDANSETRON 4 MG/1
4 TABLET, FILM COATED ORAL EVERY 8 HOURS PRN
Status: DISCONTINUED | OUTPATIENT
Start: 2024-10-28 | End: 2024-10-30 | Stop reason: HOSPADM

## 2024-10-28 RX ORDER — IPRATROPIUM BROMIDE AND ALBUTEROL SULFATE 2.5; .5 MG/3ML; MG/3ML
3 SOLUTION RESPIRATORY (INHALATION)
Status: DISCONTINUED | OUTPATIENT
Start: 2024-10-28 | End: 2024-10-29

## 2024-10-28 RX ORDER — MEPERIDINE HYDROCHLORIDE 25 MG/ML
12.5 INJECTION INTRAMUSCULAR; INTRAVENOUS; SUBCUTANEOUS EVERY 10 MIN PRN
Status: DISCONTINUED | OUTPATIENT
Start: 2024-10-28 | End: 2024-10-28 | Stop reason: HOSPADM

## 2024-10-28 RX ORDER — ESMOLOL HYDROCHLORIDE 10 MG/ML
INJECTION INTRAVENOUS AS NEEDED
Status: DISCONTINUED | OUTPATIENT
Start: 2024-10-28 | End: 2024-10-28

## 2024-10-28 RX ORDER — BUPIVACAINE HCL/EPINEPHRINE 0.25-.0005
VIAL (ML) INJECTION AS NEEDED
Status: DISCONTINUED | OUTPATIENT
Start: 2024-10-28 | End: 2024-10-28 | Stop reason: HOSPADM

## 2024-10-28 RX ORDER — IPRATROPIUM BROMIDE 0.5 MG/2.5ML
500 SOLUTION RESPIRATORY (INHALATION) ONCE
Status: DISCONTINUED | OUTPATIENT
Start: 2024-10-28 | End: 2024-10-28 | Stop reason: HOSPADM

## 2024-10-28 SDOH — ECONOMIC STABILITY: HOUSING INSECURITY: AT ANY TIME IN THE PAST 12 MONTHS, WERE YOU HOMELESS OR LIVING IN A SHELTER (INCLUDING NOW)?: NO

## 2024-10-28 SDOH — SOCIAL STABILITY: SOCIAL INSECURITY: HAVE YOU HAD THOUGHTS OF HARMING ANYONE ELSE?: NO

## 2024-10-28 SDOH — SOCIAL STABILITY: SOCIAL INSECURITY
WITHIN THE LAST YEAR, HAVE YOU BEEN RAPED OR FORCED TO HAVE ANY KIND OF SEXUAL ACTIVITY BY YOUR PARTNER OR EX-PARTNER?: NO

## 2024-10-28 SDOH — ECONOMIC STABILITY: TRANSPORTATION INSECURITY: IN THE PAST 12 MONTHS, HAS LACK OF TRANSPORTATION KEPT YOU FROM MEDICAL APPOINTMENTS OR FROM GETTING MEDICATIONS?: NO

## 2024-10-28 SDOH — HEALTH STABILITY: MENTAL HEALTH: HOW OFTEN DO YOU HAVE SIX OR MORE DRINKS ON ONE OCCASION?: NEVER

## 2024-10-28 SDOH — SOCIAL STABILITY: SOCIAL INSECURITY: ARE THERE ANY APPARENT SIGNS OF INJURIES/BEHAVIORS THAT COULD BE RELATED TO ABUSE/NEGLECT?: NO

## 2024-10-28 SDOH — HEALTH STABILITY: MENTAL HEALTH
DO YOU FEEL STRESS - TENSE, RESTLESS, NERVOUS, OR ANXIOUS, OR UNABLE TO SLEEP AT NIGHT BECAUSE YOUR MIND IS TROUBLED ALL THE TIME - THESE DAYS?: ONLY A LITTLE

## 2024-10-28 SDOH — ECONOMIC STABILITY: INCOME INSECURITY: IN THE PAST 12 MONTHS HAS THE ELECTRIC, GAS, OIL, OR WATER COMPANY THREATENED TO SHUT OFF SERVICES IN YOUR HOME?: NO

## 2024-10-28 SDOH — ECONOMIC STABILITY: HOUSING INSECURITY: IN THE LAST 12 MONTHS, WAS THERE A TIME WHEN YOU WERE NOT ABLE TO PAY THE MORTGAGE OR RENT ON TIME?: NO

## 2024-10-28 SDOH — HEALTH STABILITY: PHYSICAL HEALTH
HOW OFTEN DO YOU NEED TO HAVE SOMEONE HELP YOU WHEN YOU READ INSTRUCTIONS, PAMPHLETS, OR OTHER WRITTEN MATERIAL FROM YOUR DOCTOR OR PHARMACY?: NEVER

## 2024-10-28 SDOH — HEALTH STABILITY: PHYSICAL HEALTH: ON AVERAGE, HOW MANY DAYS PER WEEK DO YOU ENGAGE IN MODERATE TO STRENUOUS EXERCISE (LIKE A BRISK WALK)?: 3 DAYS

## 2024-10-28 SDOH — SOCIAL STABILITY: SOCIAL INSECURITY: DO YOU FEEL ANYONE HAS EXPLOITED OR TAKEN ADVANTAGE OF YOU FINANCIALLY OR OF YOUR PERSONAL PROPERTY?: NO

## 2024-10-28 SDOH — HEALTH STABILITY: MENTAL HEALTH: CURRENT SMOKER: 0

## 2024-10-28 SDOH — SOCIAL STABILITY: SOCIAL INSECURITY: WITHIN THE LAST YEAR, HAVE YOU BEEN AFRAID OF YOUR PARTNER OR EX-PARTNER?: NO

## 2024-10-28 SDOH — HEALTH STABILITY: MENTAL HEALTH: HOW MANY DRINKS CONTAINING ALCOHOL DO YOU HAVE ON A TYPICAL DAY WHEN YOU ARE DRINKING?: 1 OR 2

## 2024-10-28 SDOH — HEALTH STABILITY: MENTAL HEALTH: HOW OFTEN DO YOU HAVE A DRINK CONTAINING ALCOHOL?: 2-4 TIMES A MONTH

## 2024-10-28 SDOH — SOCIAL STABILITY: SOCIAL INSECURITY
WITHIN THE LAST YEAR, HAVE YOU BEEN KICKED, HIT, SLAPPED, OR OTHERWISE PHYSICALLY HURT BY YOUR PARTNER OR EX-PARTNER?: NO

## 2024-10-28 SDOH — ECONOMIC STABILITY: FOOD INSECURITY: WITHIN THE PAST 12 MONTHS, YOU WORRIED THAT YOUR FOOD WOULD RUN OUT BEFORE YOU GOT THE MONEY TO BUY MORE.: NEVER TRUE

## 2024-10-28 SDOH — SOCIAL STABILITY: SOCIAL INSECURITY: WITHIN THE LAST YEAR, HAVE YOU BEEN HUMILIATED OR EMOTIONALLY ABUSED IN OTHER WAYS BY YOUR PARTNER OR EX-PARTNER?: NO

## 2024-10-28 SDOH — ECONOMIC STABILITY: FOOD INSECURITY: WITHIN THE PAST 12 MONTHS, THE FOOD YOU BOUGHT JUST DIDN'T LAST AND YOU DIDN'T HAVE MONEY TO GET MORE.: NEVER TRUE

## 2024-10-28 SDOH — SOCIAL STABILITY: SOCIAL INSECURITY: HAVE YOU HAD ANY THOUGHTS OF HARMING ANYONE ELSE?: NO

## 2024-10-28 SDOH — ECONOMIC STABILITY: FOOD INSECURITY: HOW HARD IS IT FOR YOU TO PAY FOR THE VERY BASICS LIKE FOOD, HOUSING, MEDICAL CARE, AND HEATING?: NOT HARD AT ALL

## 2024-10-28 SDOH — SOCIAL STABILITY: SOCIAL INSECURITY: ARE YOU OR HAVE YOU BEEN THREATENED OR ABUSED PHYSICALLY, EMOTIONALLY, OR SEXUALLY BY ANYONE?: NO

## 2024-10-28 SDOH — SOCIAL STABILITY: SOCIAL INSECURITY: WERE YOU ABLE TO COMPLETE ALL THE BEHAVIORAL HEALTH SCREENINGS?: YES

## 2024-10-28 SDOH — SOCIAL STABILITY: SOCIAL INSECURITY: DO YOU FEEL UNSAFE GOING BACK TO THE PLACE WHERE YOU ARE LIVING?: NO

## 2024-10-28 SDOH — HEALTH STABILITY: PHYSICAL HEALTH: ON AVERAGE, HOW MANY MINUTES DO YOU ENGAGE IN EXERCISE AT THIS LEVEL?: 30 MIN

## 2024-10-28 SDOH — SOCIAL STABILITY: SOCIAL INSECURITY: ABUSE: ADULT

## 2024-10-28 SDOH — SOCIAL STABILITY: SOCIAL INSECURITY: DOES ANYONE TRY TO KEEP YOU FROM HAVING/CONTACTING OTHER FRIENDS OR DOING THINGS OUTSIDE YOUR HOME?: NO

## 2024-10-28 SDOH — SOCIAL STABILITY: SOCIAL INSECURITY: HAS ANYONE EVER THREATENED TO HURT YOUR FAMILY OR YOUR PETS?: NO

## 2024-10-28 SDOH — ECONOMIC STABILITY: HOUSING INSECURITY: IN THE PAST 12 MONTHS, HOW MANY TIMES HAVE YOU MOVED WHERE YOU WERE LIVING?: 0

## 2024-10-28 ASSESSMENT — PAIN SCALES - GENERAL
PAINLEVEL_OUTOF10: 3
PAINLEVEL_OUTOF10: 6
PAINLEVEL_OUTOF10: 7
PAINLEVEL_OUTOF10: 8
PAINLEVEL_OUTOF10: 7
PAINLEVEL_OUTOF10: 0 - NO PAIN
PAINLEVEL_OUTOF10: 7
PAINLEVEL_OUTOF10: 0 - NO PAIN
PAINLEVEL_OUTOF10: 3
PAIN_LEVEL: 0
PAINLEVEL_OUTOF10: 6
PAINLEVEL_OUTOF10: 2
PAINLEVEL_OUTOF10: 8
PAINLEVEL_OUTOF10: 3
PAINLEVEL_OUTOF10: 3
PAINLEVEL_OUTOF10: 0 - NO PAIN
PAINLEVEL_OUTOF10: 6
PAINLEVEL_OUTOF10: 0 - NO PAIN
PAINLEVEL_OUTOF10: 7
PAINLEVEL_OUTOF10: 0 - NO PAIN
PAINLEVEL_OUTOF10: 6
PAINLEVEL_OUTOF10: 5 - MODERATE PAIN
PAINLEVEL_OUTOF10: 0 - NO PAIN
PAINLEVEL_OUTOF10: 3
PAINLEVEL_OUTOF10: 3
PAINLEVEL_OUTOF10: 4
PAINLEVEL_OUTOF10: 3
PAINLEVEL_OUTOF10: 3
PAINLEVEL_OUTOF10: 6

## 2024-10-28 ASSESSMENT — PAIN DESCRIPTION - DESCRIPTORS
DESCRIPTORS: ACHING;DISCOMFORT;SHARP
DESCRIPTORS: ACHING;SORE;SHARP
DESCRIPTORS: ACHING
DESCRIPTORS: ACHING;DISCOMFORT;SHARP
DESCRIPTORS: ACHING
DESCRIPTORS: ACHING;DISCOMFORT;SHARP
DESCRIPTORS: ACHING

## 2024-10-28 ASSESSMENT — ACTIVITIES OF DAILY LIVING (ADL)
DRESSING YOURSELF: INDEPENDENT
HEARING - LEFT EAR: FUNCTIONAL
ADEQUATE_TO_COMPLETE_ADL: YES
GROOMING: INDEPENDENT
JUDGMENT_ADEQUATE_SAFELY_COMPLETE_DAILY_ACTIVITIES: YES
PATIENT'S MEMORY ADEQUATE TO SAFELY COMPLETE DAILY ACTIVITIES?: YES
HEARING - RIGHT EAR: FUNCTIONAL
WALKS IN HOME: INDEPENDENT
BATHING: INDEPENDENT
LACK_OF_TRANSPORTATION: NO
TOILETING: INDEPENDENT
FEEDING YOURSELF: INDEPENDENT

## 2024-10-28 ASSESSMENT — PAIN - FUNCTIONAL ASSESSMENT

## 2024-10-28 ASSESSMENT — PAIN DESCRIPTION - ORIENTATION
ORIENTATION: LEFT

## 2024-10-28 ASSESSMENT — PATIENT HEALTH QUESTIONNAIRE - PHQ9
SUM OF ALL RESPONSES TO PHQ9 QUESTIONS 1 & 2: 0
1. LITTLE INTEREST OR PLEASURE IN DOING THINGS: NOT AT ALL
2. FEELING DOWN, DEPRESSED OR HOPELESS: NOT AT ALL

## 2024-10-28 ASSESSMENT — PAIN DESCRIPTION - LOCATION
LOCATION: CHEST
LOCATION: RIB CAGE
LOCATION: CHEST

## 2024-10-28 ASSESSMENT — COGNITIVE AND FUNCTIONAL STATUS - GENERAL
DAILY ACTIVITIY SCORE: 24
MOBILITY SCORE: 24
PATIENT BASELINE BEDBOUND: NO

## 2024-10-28 ASSESSMENT — PAIN SCALES - PAIN ASSESSMENT IN ADVANCED DEMENTIA (PAINAD)
TOTALSCORE: MEDICATION (SEE MAR)
TOTALSCORE: MEDICATION (SEE MAR)

## 2024-10-28 ASSESSMENT — LIFESTYLE VARIABLES
AUDIT-C TOTAL SCORE: 2
SKIP TO QUESTIONS 9-10: 1

## 2024-10-29 ENCOUNTER — PHARMACY VISIT (OUTPATIENT)
Dept: PHARMACY | Facility: CLINIC | Age: 64
End: 2024-10-29
Payer: MEDICARE

## 2024-10-29 ENCOUNTER — APPOINTMENT (OUTPATIENT)
Dept: RADIOLOGY | Facility: HOSPITAL | Age: 64
End: 2024-10-29
Payer: COMMERCIAL

## 2024-10-29 LAB
ANION GAP SERPL CALCULATED.3IONS-SCNC: 13 MMOL/L (ref 10–20)
BUN SERPL-MCNC: 12 MG/DL (ref 6–23)
CALCIUM SERPL-MCNC: 9.2 MG/DL (ref 8.6–10.3)
CHLORIDE SERPL-SCNC: 102 MMOL/L (ref 98–107)
CO2 SERPL-SCNC: 25 MMOL/L (ref 21–32)
CREAT SERPL-MCNC: 0.62 MG/DL (ref 0.5–1.05)
EGFRCR SERPLBLD CKD-EPI 2021: >90 ML/MIN/1.73M*2
ERYTHROCYTE [DISTWIDTH] IN BLOOD BY AUTOMATED COUNT: 12 % (ref 11.5–14.5)
GLUCOSE BLD MANUAL STRIP-MCNC: 133 MG/DL (ref 74–99)
GLUCOSE BLD MANUAL STRIP-MCNC: 154 MG/DL (ref 74–99)
GLUCOSE BLD MANUAL STRIP-MCNC: 181 MG/DL (ref 74–99)
GLUCOSE BLD MANUAL STRIP-MCNC: 210 MG/DL (ref 74–99)
GLUCOSE SERPL-MCNC: 129 MG/DL (ref 74–99)
HCT VFR BLD AUTO: 42.8 % (ref 36–46)
HGB BLD-MCNC: 14.3 G/DL (ref 12–16)
MAGNESIUM SERPL-MCNC: 2.36 MG/DL (ref 1.6–2.4)
MCH RBC QN AUTO: 32.4 PG (ref 26–34)
MCHC RBC AUTO-ENTMCNC: 33.4 G/DL (ref 32–36)
MCV RBC AUTO: 97 FL (ref 80–100)
NRBC BLD-RTO: 0 /100 WBCS (ref 0–0)
PLATELET # BLD AUTO: 297 X10*3/UL (ref 150–450)
POTASSIUM SERPL-SCNC: 4.3 MMOL/L (ref 3.5–5.3)
RBC # BLD AUTO: 4.42 X10*6/UL (ref 4–5.2)
SODIUM SERPL-SCNC: 136 MMOL/L (ref 136–145)
WBC # BLD AUTO: 13.5 X10*3/UL (ref 4.4–11.3)

## 2024-10-29 PROCEDURE — 71045 X-RAY EXAM CHEST 1 VIEW: CPT

## 2024-10-29 PROCEDURE — 36415 COLL VENOUS BLD VENIPUNCTURE: CPT | Performed by: PHYSICIAN ASSISTANT

## 2024-10-29 PROCEDURE — 94640 AIRWAY INHALATION TREATMENT: CPT

## 2024-10-29 PROCEDURE — 2500000001 HC RX 250 WO HCPCS SELF ADMINISTERED DRUGS (ALT 637 FOR MEDICARE OP)

## 2024-10-29 PROCEDURE — 2500000002 HC RX 250 W HCPCS SELF ADMINISTERED DRUGS (ALT 637 FOR MEDICARE OP, ALT 636 FOR OP/ED): Performed by: PHYSICIAN ASSISTANT

## 2024-10-29 PROCEDURE — 2500000001 HC RX 250 WO HCPCS SELF ADMINISTERED DRUGS (ALT 637 FOR MEDICARE OP): Performed by: PHYSICIAN ASSISTANT

## 2024-10-29 PROCEDURE — 83735 ASSAY OF MAGNESIUM: CPT | Performed by: PHYSICIAN ASSISTANT

## 2024-10-29 PROCEDURE — 97116 GAIT TRAINING THERAPY: CPT | Mod: GP

## 2024-10-29 PROCEDURE — 2500000005 HC RX 250 GENERAL PHARMACY W/O HCPCS: Performed by: PHYSICIAN ASSISTANT

## 2024-10-29 PROCEDURE — 97165 OT EVAL LOW COMPLEX 30 MIN: CPT | Mod: GO

## 2024-10-29 PROCEDURE — 99232 SBSQ HOSP IP/OBS MODERATE 35: CPT | Performed by: PHYSICIAN ASSISTANT

## 2024-10-29 PROCEDURE — 2500000002 HC RX 250 W HCPCS SELF ADMINISTERED DRUGS (ALT 637 FOR MEDICARE OP, ALT 636 FOR OP/ED)

## 2024-10-29 PROCEDURE — 97162 PT EVAL MOD COMPLEX 30 MIN: CPT | Mod: GP

## 2024-10-29 PROCEDURE — RXMED WILLOW AMBULATORY MEDICATION CHARGE

## 2024-10-29 PROCEDURE — 80048 BASIC METABOLIC PNL TOTAL CA: CPT | Performed by: PHYSICIAN ASSISTANT

## 2024-10-29 PROCEDURE — 71045 X-RAY EXAM CHEST 1 VIEW: CPT | Performed by: RADIOLOGY

## 2024-10-29 PROCEDURE — 9420000001 HC RT PATIENT EDUCATION 5 MIN

## 2024-10-29 PROCEDURE — 2500000002 HC RX 250 W HCPCS SELF ADMINISTERED DRUGS (ALT 637 FOR MEDICARE OP, ALT 636 FOR OP/ED): Performed by: THORACIC SURGERY (CARDIOTHORACIC VASCULAR SURGERY)

## 2024-10-29 PROCEDURE — 2060000001 HC INTERMEDIATE ICU ROOM DAILY

## 2024-10-29 PROCEDURE — 82947 ASSAY GLUCOSE BLOOD QUANT: CPT

## 2024-10-29 PROCEDURE — 99222 1ST HOSP IP/OBS MODERATE 55: CPT

## 2024-10-29 PROCEDURE — 97530 THERAPEUTIC ACTIVITIES: CPT | Mod: GO

## 2024-10-29 PROCEDURE — 85027 COMPLETE CBC AUTOMATED: CPT | Performed by: PHYSICIAN ASSISTANT

## 2024-10-29 PROCEDURE — 2500000004 HC RX 250 GENERAL PHARMACY W/ HCPCS (ALT 636 FOR OP/ED): Performed by: PHYSICIAN ASSISTANT

## 2024-10-29 PROCEDURE — 2500000004 HC RX 250 GENERAL PHARMACY W/ HCPCS (ALT 636 FOR OP/ED)

## 2024-10-29 PROCEDURE — 94664 DEMO&/EVAL PT USE INHALER: CPT

## 2024-10-29 RX ORDER — IPRATROPIUM BROMIDE AND ALBUTEROL SULFATE 2.5; .5 MG/3ML; MG/3ML
3 SOLUTION RESPIRATORY (INHALATION)
Status: DISCONTINUED | OUTPATIENT
Start: 2024-10-29 | End: 2024-10-30 | Stop reason: HOSPADM

## 2024-10-29 RX ORDER — OXYCODONE HYDROCHLORIDE 5 MG/1
5 TABLET ORAL EVERY 6 HOURS PRN
Qty: 20 TABLET | Refills: 0 | Status: SHIPPED | OUTPATIENT
Start: 2024-10-29 | End: 2024-11-08 | Stop reason: ALTCHOICE

## 2024-10-29 SDOH — ECONOMIC STABILITY: FOOD INSECURITY: WITHIN THE PAST 12 MONTHS, YOU WORRIED THAT YOUR FOOD WOULD RUN OUT BEFORE YOU GOT THE MONEY TO BUY MORE.: NEVER TRUE

## 2024-10-29 SDOH — SOCIAL STABILITY: SOCIAL NETWORK
DO YOU BELONG TO ANY CLUBS OR ORGANIZATIONS SUCH AS CHURCH GROUPS, UNIONS, FRATERNAL OR ATHLETIC GROUPS, OR SCHOOL GROUPS?: NO

## 2024-10-29 SDOH — ECONOMIC STABILITY: FOOD INSECURITY: WITHIN THE PAST 12 MONTHS, THE FOOD YOU BOUGHT JUST DIDN'T LAST AND YOU DIDN'T HAVE MONEY TO GET MORE.: NEVER TRUE

## 2024-10-29 SDOH — ECONOMIC STABILITY: INCOME INSECURITY: IN THE PAST 12 MONTHS HAS THE ELECTRIC, GAS, OIL, OR WATER COMPANY THREATENED TO SHUT OFF SERVICES IN YOUR HOME?: NO

## 2024-10-29 SDOH — ECONOMIC STABILITY: HOUSING INSECURITY: IN THE LAST 12 MONTHS, WAS THERE A TIME WHEN YOU WERE NOT ABLE TO PAY THE MORTGAGE OR RENT ON TIME?: NO

## 2024-10-29 SDOH — SOCIAL STABILITY: SOCIAL INSECURITY: ARE YOU MARRIED, WIDOWED, DIVORCED, SEPARATED, NEVER MARRIED, OR LIVING WITH A PARTNER?: MARRIED

## 2024-10-29 SDOH — HEALTH STABILITY: PHYSICAL HEALTH: ON AVERAGE, HOW MANY DAYS PER WEEK DO YOU ENGAGE IN MODERATE TO STRENUOUS EXERCISE (LIKE A BRISK WALK)?: 3 DAYS

## 2024-10-29 SDOH — ECONOMIC STABILITY: HOUSING INSECURITY: AT ANY TIME IN THE PAST 12 MONTHS, WERE YOU HOMELESS OR LIVING IN A SHELTER (INCLUDING NOW)?: NO

## 2024-10-29 SDOH — ECONOMIC STABILITY: FOOD INSECURITY: HOW HARD IS IT FOR YOU TO PAY FOR THE VERY BASICS LIKE FOOD, HOUSING, MEDICAL CARE, AND HEATING?: SOMEWHAT HARD

## 2024-10-29 SDOH — SOCIAL STABILITY: SOCIAL NETWORK: HOW OFTEN DO YOU ATTEND CHURCH OR RELIGIOUS SERVICES?: NEVER

## 2024-10-29 SDOH — SOCIAL STABILITY: SOCIAL INSECURITY: WITHIN THE LAST YEAR, HAVE YOU BEEN HUMILIATED OR EMOTIONALLY ABUSED IN OTHER WAYS BY YOUR PARTNER OR EX-PARTNER?: NO

## 2024-10-29 SDOH — SOCIAL STABILITY: SOCIAL INSECURITY: WITHIN THE LAST YEAR, HAVE YOU BEEN AFRAID OF YOUR PARTNER OR EX-PARTNER?: NO

## 2024-10-29 SDOH — HEALTH STABILITY: MENTAL HEALTH: HOW MANY DRINKS CONTAINING ALCOHOL DO YOU HAVE ON A TYPICAL DAY WHEN YOU ARE DRINKING?: 1 OR 2

## 2024-10-29 SDOH — ECONOMIC STABILITY: HOUSING INSECURITY: IN THE PAST 12 MONTHS, HOW MANY TIMES HAVE YOU MOVED WHERE YOU WERE LIVING?: 0

## 2024-10-29 SDOH — ECONOMIC STABILITY: TRANSPORTATION INSECURITY: IN THE PAST 12 MONTHS, HAS LACK OF TRANSPORTATION KEPT YOU FROM MEDICAL APPOINTMENTS OR FROM GETTING MEDICATIONS?: NO

## 2024-10-29 SDOH — HEALTH STABILITY: MENTAL HEALTH: HOW OFTEN DO YOU HAVE SIX OR MORE DRINKS ON ONE OCCASION?: NEVER

## 2024-10-29 SDOH — HEALTH STABILITY: MENTAL HEALTH: HOW OFTEN DO YOU HAVE A DRINK CONTAINING ALCOHOL?: 2-4 TIMES A MONTH

## 2024-10-29 SDOH — SOCIAL STABILITY: SOCIAL NETWORK: HOW OFTEN DO YOU ATTEND MEETINGS OF THE CLUBS OR ORGANIZATIONS YOU BELONG TO?: NEVER

## 2024-10-29 SDOH — HEALTH STABILITY: PHYSICAL HEALTH: ON AVERAGE, HOW MANY MINUTES DO YOU ENGAGE IN EXERCISE AT THIS LEVEL?: 30 MIN

## 2024-10-29 SDOH — SOCIAL STABILITY: SOCIAL NETWORK
IN A TYPICAL WEEK, HOW MANY TIMES DO YOU TALK ON THE PHONE WITH FAMILY, FRIENDS, OR NEIGHBORS?: MORE THAN THREE TIMES A WEEK

## 2024-10-29 SDOH — SOCIAL STABILITY: SOCIAL NETWORK: HOW OFTEN DO YOU GET TOGETHER WITH FRIENDS OR RELATIVES?: MORE THAN THREE TIMES A WEEK

## 2024-10-29 ASSESSMENT — COGNITIVE AND FUNCTIONAL STATUS - GENERAL
TOILETING: A LITTLE
PERSONAL GROOMING: A LITTLE
DAILY ACTIVITIY SCORE: 24
CLIMB 3 TO 5 STEPS WITH RAILING: A LITTLE
MOVING TO AND FROM BED TO CHAIR: A LITTLE
MOBILITY SCORE: 24
DAILY ACTIVITIY SCORE: 24
MOBILITY SCORE: 24
HELP NEEDED FOR BATHING: A LITTLE
WALKING IN HOSPITAL ROOM: A LITTLE
DAILY ACTIVITIY SCORE: 19
DRESSING REGULAR LOWER BODY CLOTHING: A LITTLE
MOBILITY SCORE: 20
DRESSING REGULAR UPPER BODY CLOTHING: A LITTLE
STANDING UP FROM CHAIR USING ARMS: A LITTLE

## 2024-10-29 ASSESSMENT — PAIN - FUNCTIONAL ASSESSMENT
PAIN_FUNCTIONAL_ASSESSMENT: 0-10
PAIN_FUNCTIONAL_ASSESSMENT: FLACC (FACE, LEGS, ACTIVITY, CRY, CONSOLABILITY)
PAIN_FUNCTIONAL_ASSESSMENT: 0-10
PAIN_FUNCTIONAL_ASSESSMENT: FLACC (FACE, LEGS, ACTIVITY, CRY, CONSOLABILITY)
PAIN_FUNCTIONAL_ASSESSMENT: 0-10

## 2024-10-29 ASSESSMENT — PAIN DESCRIPTION - ORIENTATION
ORIENTATION: LEFT

## 2024-10-29 ASSESSMENT — PAIN DESCRIPTION - DESCRIPTORS
DESCRIPTORS: SHARP;SORE
DESCRIPTORS: SORE;ACHING
DESCRIPTORS: DISCOMFORT
DESCRIPTORS: THROBBING
DESCRIPTORS: ACHING
DESCRIPTORS: THROBBING
DESCRIPTORS: ACHING
DESCRIPTORS: THROBBING
DESCRIPTORS: DISCOMFORT
DESCRIPTORS: SORE
DESCRIPTORS: ACHING;SHARP;SORE
DESCRIPTORS: ACHING
DESCRIPTORS: ACHING

## 2024-10-29 ASSESSMENT — ACTIVITIES OF DAILY LIVING (ADL)
LACK_OF_TRANSPORTATION: NO
ADL_ASSISTANCE: INDEPENDENT
ADL_ASSISTANCE: INDEPENDENT
LACK_OF_TRANSPORTATION: NO
BATHING_ASSISTANCE: MINIMAL

## 2024-10-29 ASSESSMENT — PAIN SCALES - GENERAL
PAINLEVEL_OUTOF10: 6
PAINLEVEL_OUTOF10: 7
PAINLEVEL_OUTOF10: 3
PAINLEVEL_OUTOF10: 0 - NO PAIN
PAINLEVEL_OUTOF10: 8
PAINLEVEL_OUTOF10: 2
PAINLEVEL_OUTOF10: 6
PAINLEVEL_OUTOF10: 8
PAINLEVEL_OUTOF10: 7
PAINLEVEL_OUTOF10: 9
PAINLEVEL_OUTOF10: 4
PAINLEVEL_OUTOF10: 3
PAINLEVEL_OUTOF10: 4
PAINLEVEL_OUTOF10: 7
PAINLEVEL_OUTOF10: 2
PAINLEVEL_OUTOF10: 0 - NO PAIN

## 2024-10-29 ASSESSMENT — PAIN DESCRIPTION - LOCATION
LOCATION: RIB CAGE
LOCATION: BACK
LOCATION: CHEST
LOCATION: CHEST
LOCATION: RIB CAGE

## 2024-10-29 ASSESSMENT — LIFESTYLE VARIABLES
AUDIT-C TOTAL SCORE: 2
SKIP TO QUESTIONS 9-10: 1

## 2024-10-30 ENCOUNTER — APPOINTMENT (OUTPATIENT)
Dept: RADIOLOGY | Facility: HOSPITAL | Age: 64
End: 2024-10-30
Payer: COMMERCIAL

## 2024-10-30 ENCOUNTER — PHARMACY VISIT (OUTPATIENT)
Dept: PHARMACY | Facility: CLINIC | Age: 64
End: 2024-10-30
Payer: MEDICARE

## 2024-10-30 VITALS
BODY MASS INDEX: 30.08 KG/M2 | SYSTOLIC BLOOD PRESSURE: 96 MMHG | TEMPERATURE: 97.3 F | HEART RATE: 81 BPM | WEIGHT: 169.75 LBS | RESPIRATION RATE: 16 BRPM | OXYGEN SATURATION: 91 % | DIASTOLIC BLOOD PRESSURE: 63 MMHG | HEIGHT: 63 IN

## 2024-10-30 DIAGNOSIS — Z98.890 STATUS POST LUNG SURGERY: Primary | ICD-10-CM

## 2024-10-30 LAB
ANION GAP SERPL CALCULATED.3IONS-SCNC: 13 MMOL/L (ref 10–20)
BUN SERPL-MCNC: 14 MG/DL (ref 6–23)
CALCIUM SERPL-MCNC: 9.3 MG/DL (ref 8.6–10.3)
CHLORIDE SERPL-SCNC: 100 MMOL/L (ref 98–107)
CO2 SERPL-SCNC: 27 MMOL/L (ref 21–32)
CREAT SERPL-MCNC: 0.67 MG/DL (ref 0.5–1.05)
EGFRCR SERPLBLD CKD-EPI 2021: >90 ML/MIN/1.73M*2
ERYTHROCYTE [DISTWIDTH] IN BLOOD BY AUTOMATED COUNT: 12.3 % (ref 11.5–14.5)
GLUCOSE BLD MANUAL STRIP-MCNC: 161 MG/DL (ref 74–99)
GLUCOSE BLD MANUAL STRIP-MCNC: 197 MG/DL (ref 74–99)
GLUCOSE SERPL-MCNC: 138 MG/DL (ref 74–99)
HCT VFR BLD AUTO: 40.8 % (ref 36–46)
HGB BLD-MCNC: 13.5 G/DL (ref 12–16)
MAGNESIUM SERPL-MCNC: 2.04 MG/DL (ref 1.6–2.4)
MCH RBC QN AUTO: 32.5 PG (ref 26–34)
MCHC RBC AUTO-ENTMCNC: 33.1 G/DL (ref 32–36)
MCV RBC AUTO: 98 FL (ref 80–100)
NRBC BLD-RTO: 0 /100 WBCS (ref 0–0)
PLATELET # BLD AUTO: 287 X10*3/UL (ref 150–450)
POTASSIUM SERPL-SCNC: 4.1 MMOL/L (ref 3.5–5.3)
RBC # BLD AUTO: 4.15 X10*6/UL (ref 4–5.2)
SODIUM SERPL-SCNC: 136 MMOL/L (ref 136–145)
WBC # BLD AUTO: 11 X10*3/UL (ref 4.4–11.3)

## 2024-10-30 PROCEDURE — 2500000002 HC RX 250 W HCPCS SELF ADMINISTERED DRUGS (ALT 637 FOR MEDICARE OP, ALT 636 FOR OP/ED): Performed by: PHYSICIAN ASSISTANT

## 2024-10-30 PROCEDURE — 80048 BASIC METABOLIC PNL TOTAL CA: CPT | Performed by: PHYSICIAN ASSISTANT

## 2024-10-30 PROCEDURE — 71045 X-RAY EXAM CHEST 1 VIEW: CPT

## 2024-10-30 PROCEDURE — 97535 SELF CARE MNGMENT TRAINING: CPT | Mod: GO

## 2024-10-30 PROCEDURE — 2500000004 HC RX 250 GENERAL PHARMACY W/ HCPCS (ALT 636 FOR OP/ED): Performed by: PHYSICIAN ASSISTANT

## 2024-10-30 PROCEDURE — 97530 THERAPEUTIC ACTIVITIES: CPT | Mod: GO

## 2024-10-30 PROCEDURE — 36415 COLL VENOUS BLD VENIPUNCTURE: CPT | Performed by: PHYSICIAN ASSISTANT

## 2024-10-30 PROCEDURE — 97116 GAIT TRAINING THERAPY: CPT | Mod: GP,CQ

## 2024-10-30 PROCEDURE — 71045 X-RAY EXAM CHEST 1 VIEW: CPT | Performed by: RADIOLOGY

## 2024-10-30 PROCEDURE — 9420000001 HC RT PATIENT EDUCATION 5 MIN

## 2024-10-30 PROCEDURE — 2500000001 HC RX 250 WO HCPCS SELF ADMINISTERED DRUGS (ALT 637 FOR MEDICARE OP): Performed by: PHYSICIAN ASSISTANT

## 2024-10-30 PROCEDURE — 85027 COMPLETE CBC AUTOMATED: CPT | Performed by: PHYSICIAN ASSISTANT

## 2024-10-30 PROCEDURE — 82947 ASSAY GLUCOSE BLOOD QUANT: CPT

## 2024-10-30 PROCEDURE — 2500000005 HC RX 250 GENERAL PHARMACY W/O HCPCS: Performed by: PHYSICIAN ASSISTANT

## 2024-10-30 PROCEDURE — 2500000004 HC RX 250 GENERAL PHARMACY W/ HCPCS (ALT 636 FOR OP/ED)

## 2024-10-30 PROCEDURE — RXMED WILLOW AMBULATORY MEDICATION CHARGE

## 2024-10-30 PROCEDURE — 94640 AIRWAY INHALATION TREATMENT: CPT

## 2024-10-30 PROCEDURE — 83735 ASSAY OF MAGNESIUM: CPT | Performed by: PHYSICIAN ASSISTANT

## 2024-10-30 PROCEDURE — 94760 N-INVAS EAR/PLS OXIMETRY 1: CPT

## 2024-10-30 RX ORDER — PREDNISONE 50 MG/1
25 TABLET ORAL DAILY
Qty: 3 TABLET | Refills: 0 | Status: SHIPPED | OUTPATIENT
Start: 2024-10-30 | End: 2024-11-08 | Stop reason: ALTCHOICE

## 2024-10-30 RX ORDER — METFORMIN HYDROCHLORIDE 500 MG/1
500 TABLET, EXTENDED RELEASE ORAL 2 TIMES DAILY
Status: CANCELLED | OUTPATIENT
Start: 2024-10-30

## 2024-10-30 RX ORDER — FLUTICASONE PROPIONATE AND SALMETEROL 500; 50 UG/1; UG/1
1 POWDER RESPIRATORY (INHALATION)
Status: CANCELLED | OUTPATIENT
Start: 2024-10-30

## 2024-10-30 RX ORDER — ERGOCALCIFEROL 1.25 MG/1
50000 CAPSULE ORAL
Status: CANCELLED | OUTPATIENT
Start: 2024-11-03

## 2024-10-30 ASSESSMENT — PAIN SCALES - GENERAL
PAINLEVEL_OUTOF10: 7
PAINLEVEL_OUTOF10: 2
PAINLEVEL_OUTOF10: 7
PAINLEVEL_OUTOF10: 3
PAINLEVEL_OUTOF10: 6
PAINLEVEL_OUTOF10: 0 - NO PAIN
PAINLEVEL_OUTOF10: 6
PAINLEVEL_OUTOF10: 3

## 2024-10-30 ASSESSMENT — COGNITIVE AND FUNCTIONAL STATUS - GENERAL
CLIMB 3 TO 5 STEPS WITH RAILING: A LITTLE
DRESSING REGULAR UPPER BODY CLOTHING: A LITTLE
MOVING TO AND FROM BED TO CHAIR: A LITTLE
WALKING IN HOSPITAL ROOM: A LITTLE
TOILETING: A LITTLE
EATING MEALS: A LITTLE
HELP NEEDED FOR BATHING: A LITTLE
MOBILITY SCORE: 20
MOBILITY SCORE: 24
DAILY ACTIVITIY SCORE: 18
DAILY ACTIVITIY SCORE: 24
STANDING UP FROM CHAIR USING ARMS: A LITTLE
PERSONAL GROOMING: A LITTLE
DRESSING REGULAR LOWER BODY CLOTHING: A LITTLE

## 2024-10-30 ASSESSMENT — PAIN - FUNCTIONAL ASSESSMENT
PAIN_FUNCTIONAL_ASSESSMENT: 0-10

## 2024-10-30 ASSESSMENT — PAIN DESCRIPTION - DESCRIPTORS
DESCRIPTORS: SORE;SHARP
DESCRIPTORS: DISCOMFORT

## 2024-10-30 ASSESSMENT — ACTIVITIES OF DAILY LIVING (ADL): HOME_MANAGEMENT_TIME_ENTRY: 10

## 2024-10-31 ENCOUNTER — PATIENT OUTREACH (OUTPATIENT)
Dept: PRIMARY CARE | Facility: CLINIC | Age: 64
End: 2024-10-31
Payer: COMMERCIAL

## 2024-11-05 ENCOUNTER — OFFICE VISIT (OUTPATIENT)
Dept: CARDIAC SURGERY | Facility: CLINIC | Age: 64
End: 2024-11-05
Payer: COMMERCIAL

## 2024-11-05 ENCOUNTER — HOSPITAL ENCOUNTER (OUTPATIENT)
Dept: RADIOLOGY | Facility: HOSPITAL | Age: 64
Discharge: HOME | End: 2024-11-05
Payer: COMMERCIAL

## 2024-11-05 VITALS — DIASTOLIC BLOOD PRESSURE: 79 MMHG | HEART RATE: 91 BPM | SYSTOLIC BLOOD PRESSURE: 103 MMHG | OXYGEN SATURATION: 96 %

## 2024-11-05 DIAGNOSIS — D38.1 NEOPLASM OF UNCERTAIN BEHAVIOR OF LUNG: Primary | ICD-10-CM

## 2024-11-05 DIAGNOSIS — Z98.890 STATUS POST LUNG SURGERY: ICD-10-CM

## 2024-11-05 PROCEDURE — 71046 X-RAY EXAM CHEST 2 VIEWS: CPT

## 2024-11-05 PROCEDURE — 71046 X-RAY EXAM CHEST 2 VIEWS: CPT | Performed by: RADIOLOGY

## 2024-11-05 PROCEDURE — 99213 OFFICE O/P EST LOW 20 MIN: CPT | Mod: 25 | Performed by: THORACIC SURGERY (CARDIOTHORACIC VASCULAR SURGERY)

## 2024-11-05 ASSESSMENT — LIFESTYLE VARIABLES
AUDIT-C TOTAL SCORE: 0
HOW OFTEN DO YOU HAVE A DRINK CONTAINING ALCOHOL: NEVER
HOW OFTEN DO YOU HAVE SIX OR MORE DRINKS ON ONE OCCASION: NEVER
SKIP TO QUESTIONS 9-10: 1
HOW MANY STANDARD DRINKS CONTAINING ALCOHOL DO YOU HAVE ON A TYPICAL DAY: PATIENT DOES NOT DRINK

## 2024-11-05 ASSESSMENT — ENCOUNTER SYMPTOMS
OCCASIONAL FEELINGS OF UNSTEADINESS: 0
DEPRESSION: 0
LOSS OF SENSATION IN FEET: 0

## 2024-11-05 ASSESSMENT — PAIN SCALES - GENERAL: PAINLEVEL_OUTOF10: 2

## 2024-11-05 NOTE — PROGRESS NOTES
CARDIOTHORACIC SURGERY FOLLOW-UP PROGRESS NOTE    Subjective   Patient is a 64 y.o. female who presents for routine post-operative follow up. She comes in today complaining of  mild incisional pain . Her activity level has been good.       Objective   Physical Exam  /79   Pulse 91   SpO2 96%   Heart: regular rate and rhythm, no murmurs  Lungs: clear to auscultation bilaterally  Extremities: warm and well-perfused, peripheral pulses intact, no cyanosis, clubbing, or edema  Incision(s):  healing well,without any signs of infection Suture removed with out difficulty from ct site.    Data Review:  X-ray: normal chest x-ray    Assessment/Plan   There are no diagnoses linked to this encounter.  Patient is s/p  Robot assisted LLL segmentectomy on 10/28/24. Final pathology is pending. We will call patient with final path. If nodes are all negative we will follow up in 6 months with a ct chest.      Kathia Greene PA-C

## 2024-11-06 ENCOUNTER — PATIENT MESSAGE (OUTPATIENT)
Dept: CARDIAC SURGERY | Facility: CLINIC | Age: 64
End: 2024-11-06
Payer: COMMERCIAL

## 2024-11-07 DIAGNOSIS — E11.9 TYPE 2 DIABETES MELLITUS WITHOUT COMPLICATION, WITHOUT LONG-TERM CURRENT USE OF INSULIN (MULTI): Primary | ICD-10-CM

## 2024-11-08 ENCOUNTER — APPOINTMENT (OUTPATIENT)
Dept: PRIMARY CARE | Facility: CLINIC | Age: 64
End: 2024-11-08
Payer: COMMERCIAL

## 2024-11-08 VITALS
BODY MASS INDEX: 31.18 KG/M2 | SYSTOLIC BLOOD PRESSURE: 120 MMHG | WEIGHT: 176 LBS | RESPIRATION RATE: 16 BRPM | HEIGHT: 63 IN | HEART RATE: 72 BPM | DIASTOLIC BLOOD PRESSURE: 76 MMHG | OXYGEN SATURATION: 98 %

## 2024-11-08 DIAGNOSIS — Z09 HOSPITAL DISCHARGE FOLLOW-UP: ICD-10-CM

## 2024-11-08 DIAGNOSIS — E78.2 MIXED HYPERLIPIDEMIA: Primary | ICD-10-CM

## 2024-11-08 DIAGNOSIS — Z23 ENCOUNTER FOR IMMUNIZATION: ICD-10-CM

## 2024-11-08 DIAGNOSIS — I10 ESSENTIAL (PRIMARY) HYPERTENSION: ICD-10-CM

## 2024-11-08 DIAGNOSIS — Z12.31 SCREENING MAMMOGRAM FOR BREAST CANCER: ICD-10-CM

## 2024-11-08 DIAGNOSIS — E11.9 TYPE 2 DIABETES MELLITUS WITHOUT COMPLICATION, WITHOUT LONG-TERM CURRENT USE OF INSULIN (MULTI): ICD-10-CM

## 2024-11-08 DIAGNOSIS — J41.0 SIMPLE CHRONIC BRONCHITIS (MULTI): ICD-10-CM

## 2024-11-08 LAB — POC HEMOGLOBIN A1C: 6.7 % (ref 4.2–6.5)

## 2024-11-08 RX ORDER — FLUTICASONE PROPIONATE AND SALMETEROL 500; 50 UG/1; UG/1
1 POWDER RESPIRATORY (INHALATION)
Qty: 60 EACH | Refills: 3 | Status: SHIPPED | OUTPATIENT
Start: 2024-11-08

## 2024-11-08 ASSESSMENT — PATIENT HEALTH QUESTIONNAIRE - PHQ9
2. FEELING DOWN, DEPRESSED OR HOPELESS: NOT AT ALL
1. LITTLE INTEREST OR PLEASURE IN DOING THINGS: NOT AT ALL
SUM OF ALL RESPONSES TO PHQ9 QUESTIONS 1 AND 2: 0

## 2024-11-08 ASSESSMENT — ENCOUNTER SYMPTOMS
LOSS OF SENSATION IN FEET: 0
OCCASIONAL FEELINGS OF UNSTEADINESS: 0
DEPRESSION: 0

## 2024-11-08 NOTE — PROGRESS NOTES
"Patient: Clara Rashid  : 1960  PCP: Gladys Fierro, APRN-CNP  MRN: [id number]  Program: Transitional Care Management  Status: Enrolled  Effective Dates: 10/31/2024 - present  Responsible Staff: Eri Aguilera  Social Drivers to be Addressed: Financial Resource Strain, Physical Activity, Social Connections, Tobacco Use      Clara Rashid is a 64 y.o. female presenting today for follow-up after being discharged from the hospital 8 days ago. The main problem requiring admission was \"left lower lobe wedge resection, robotic assisted completion left lower lobe superior segmentectomy and mediastinal lymphadenectomy\" . The discharge summary and/or Transitional Care Management documentation was reviewed. Medication reconciliation was performed as indicated via the \"Adán as Reviewed\" timestamp.     Clara Rashid was contacted by Transitional Care Management services two days after her discharge. This encounter and supporting documentation was reviewed.    DM  Compliant with Metformin 500mg BID  Dulaglutide once weekly 3mg.   Injection day Wednesday   Have been running higher since surgery. Was on steroid  as well,. No longer taking.     HTN  Compliant on Lisinopril 5mg daily   Does not Monitor BP at home  HLD  Compliant on Crestor      Review of Systems  Review of Systems negative except as noted in HPI and Chief complaint.    Current Outpatient Medications:     acetaminophen (Tylenol) 500 mg tablet, Take 2 tablets (1,000 mg) by mouth every 6 hours if needed for mild pain (1 - 3)., Disp: , Rfl:     albuterol (Proventil HFA) 90 mcg/actuation inhaler, Inhale 2 puffs every 4 hours if needed for wheezing or shortness of breath., Disp: 6.7 g, Rfl: 5    dulaglutide 3 mg/0.5 mL pen injector, Inject 3 mg under the skin 1 (one) time per week., Disp: 6 mL, Rfl: 1    ergocalciferol (Vitamin D-2) 1.25 MG (33138 UT) capsule, Take 1 capsule (50,000 Units) by mouth 1 (one) time per week., Disp: 12 capsule, Rfl: 0    " "guaiFENesin (Mucinex) 600 mg 12 hr tablet, Take 2 tablets (1,200 mg) by mouth 2 times a day. Do not crush, chew, or split., Disp: , Rfl:     lisinopril 5 mg tablet, Take 1 tablet (5 mg) by mouth once daily., Disp: 90 tablet, Rfl: 1    psyllium husk (METAMUCIL ORAL), Take by mouth., Disp: , Rfl:     metFORMIN  mg 24 hr tablet, Take 1 tablet (500 mg) by mouth 2 times a day., Disp: 180 tablet, Rfl: 1    rosuvastatin (Crestor) 5 mg tablet, Take 1 tablet (5 mg) by mouth once daily., Disp: 90 tablet, Rfl: 1    Wixela Inhub 500-50 mcg/dose diskus inhaler, Inhale 1 puff 2 times a day., Disp: 60 each, Rfl: 3    /76 (BP Location: Left arm, Patient Position: Sitting, BP Cuff Size: Adult)   Pulse 72   Resp 16   Ht 1.6 m (5' 3\")   Wt 79.8 kg (176 lb)   SpO2 98%   BMI 31.18 kg/m²     Physical Exam  Vitals reviewed.   Cardiovascular:      Rate and Rhythm: Normal rate.   Pulmonary:      Effort: Pulmonary effort is normal.   Musculoskeletal:         General: Normal range of motion.   Neurological:      General: No focal deficit present.      Mental Status: She is alert.   Psychiatric:         Mood and Affect: Mood normal.       The complexity of medical decision making for this patient's transitional care is moderate.    Assessment/Plan   Diagnoses and all orders for this visit:  Mixed hyperlipidemia  HYPERLIPIDEMIA:  Decrease intake of saturated fats, fast food, sweets.  Increase intake of fresh fruit fresh vegetables and lean meats.  Increase healthy fats seeds, nuts, olive oil instead of butter.  walk 150 minutes/week for heart health.  Type 2 diabetes mellitus without complication, without long-term current use of insulin (Multi)  -     POCT glycosylated hemoglobin (Hb A1C) manually resulted  Diabetes Type 2  A1C is now 6.7 from 5.9   Continue current medication.  Continue work on diet - recommend lots of fruits and vegetables, lean protein like chicken, turkey, fish, beans and Greek yogurt. Try to choose " healthier carbohydrate options like oatmeal, wheat bread and pasta, sweet potatoes. Limit sugary treats.  Reevaluate in 3 months.     Essential (primary) hypertension  HYPERTENSION:   Decrease intake of processed foods, fast foods, lunch meat, canned soups, canned veggies.  Increase intake of fresh fruits, veggies, and lean meats. Monitor blood pressure at home, keep a log and bring this with you to your next appointment. Call the office if your blood pressure runs 150/90 or higher consistently.    Blood Pressure Technique:  Sit quietly in a chair for 5 minutes with back supported and feet on the floor  Then place left arm on a table or armrest so bicep area is at the same level of heart or left breast  Check three times in a row, disregard the highest reading and average the other two  Hospital Discharge Follow Up  Continue follow up with cardio thoracic surgery as scheduled/discussed with surgeon    MAMMOGRAM For Breast Cancer Screening:  I have ordered you a mammogram to be done as soon as you are able.  We will call the results.    This note was dictated using DRAGON speech recognition software and was corrected for spelling or grammatical errors, but despite proofreading several typographical errors might be present that might affect the meaning of the content.  Gladys Fierro, CNP

## 2024-11-08 NOTE — PATIENT INSTRUCTIONS
Mixed hyperlipidemia  HYPERLIPIDEMIA:  Decrease intake of saturated fats, fast food, sweets.  Increase intake of fresh fruit fresh vegetables and lean meats.  Increase healthy fats seeds, nuts, olive oil instead of butter.  walk 150 minutes/week for heart health.  Type 2 diabetes mellitus without complication, without long-term current use of insulin (Multi)  -     POCT glycosylated hemoglobin (Hb A1C) manually resulted  Diabetes Type 2  A1C is now 6.7 from 5.9   Continue current medication.  Continue work on diet - recommend lots of fruits and vegetables, lean protein like chicken, turkey, fish, beans and Greek yogurt. Try to choose healthier carbohydrate options like oatmeal, wheat bread and pasta, sweet potatoes. Limit sugary treats.  Reevaluate in 3 months.     Essential (primary) hypertension  HYPERTENSION:   Decrease intake of processed foods, fast foods, lunch meat, canned soups, canned veggies.  Increase intake of fresh fruits, veggies, and lean meats. Monitor blood pressure at home, keep a log and bring this with you to your next appointment. Call the office if your blood pressure runs 150/90 or higher consistently.    Blood Pressure Technique:  Sit quietly in a chair for 5 minutes with back supported and feet on the floor  Then place left arm on a table or armrest so bicep area is at the same level of heart or left breast  Check three times in a row, disregard the highest reading and average the other two  Hospital Discharge Follow Up  Continue follow up with cardio thoracic surgery as scheduled/discussed with surgeon    MAMMOGRAM For Breast Cancer Screening:  I have ordered you a mammogram to be done as soon as you are able.  We will call the results.

## 2024-11-11 LAB
LAB AP ASR DISCLAIMER: NORMAL
LAB AP BLOCK FOR ADDITIONAL STUDIES: NORMAL
LABORATORY COMMENT REPORT: NORMAL
Lab: NORMAL
PATH REPORT.FINAL DX SPEC: NORMAL
PATH REPORT.GROSS SPEC: NORMAL
PATH REPORT.RELEVANT HX SPEC: NORMAL
PATH REPORT.TOTAL CANCER: NORMAL
PATHOLOGY SYNOPTIC REPORT: NORMAL

## 2024-11-13 ENCOUNTER — PATIENT OUTREACH (OUTPATIENT)
Dept: PRIMARY CARE | Facility: CLINIC | Age: 64
End: 2024-11-13
Payer: COMMERCIAL

## 2024-11-13 NOTE — PROGRESS NOTES
Call regarding appt. with PCP on 11/8/2024 after hospitalization.    At time of outreach call the patient feels as if their condition has improved  since last visit.    Reviewed the PCP appointment with the pt and addressed any questions or concerns, reviewed monitoring BP as instructed     Encouraged patient to call providers for any questions concerns or change in condition

## 2024-11-20 ENCOUNTER — PATIENT OUTREACH (OUTPATIENT)
Dept: PRIMARY CARE | Facility: CLINIC | Age: 64
End: 2024-11-20

## 2024-11-20 LAB
ELECTRONICALLY SIGNED BY: NORMAL
FOCUSED SOLID TUMOR DNA/RNA RESULTS: NORMAL

## 2024-12-02 ENCOUNTER — PATIENT OUTREACH (OUTPATIENT)
Dept: PRIMARY CARE | Facility: CLINIC | Age: 64
End: 2024-12-02
Payer: COMMERCIAL

## 2024-12-02 NOTE — PROGRESS NOTES
Unable to reach patient for  a F/U call regarding response to message in system      LVM with call back number for patient to call if needed   If no voicemail available call attempts x 2 were made to contact the patient to assist with any questions or concerns patient may have.

## 2024-12-10 ENCOUNTER — APPOINTMENT (OUTPATIENT)
Dept: PHARMACY | Facility: HOSPITAL | Age: 64
End: 2024-12-10
Payer: COMMERCIAL

## 2024-12-10 DIAGNOSIS — E11.9 TYPE 2 DIABETES MELLITUS WITHOUT COMPLICATION, WITHOUT LONG-TERM CURRENT USE OF INSULIN (MULTI): ICD-10-CM

## 2024-12-10 NOTE — PROGRESS NOTES
Pharmacy Post-Discharge Visit  Clara Rashid is a 64 y.o. female was referred to Clinical Pharmacy Team to complete a post-discharge medication optimization and monitoring visit.  The patient was referred for their T2DM.    Admission Date: 10/28/2024  Discharge Date: 10/30/2024    Referring Provider: KINDRA Cho-CNP    Subjective   Allergies   Allergen Reactions    Bactrim [Sulfamethoxazole-Trimethoprim] Hives and Itching       [facility] [place] - [facility] Portal to Registered [facility] Sites  [facility] [address]  Phone: [phone] Fax: [phone]    [id number] - [place], [place] - [address] [facility]  [address] [facility] [address]  Phone: [phone] Fax: [phone]    [facility]  [address]  [facility] [address]  Phone: [phone] Fax: [phone]      Social History     Social History Narrative    Not on file        Notable Medication changes following discharge:  Had a course of oxycodone and prednisone but done with both     Review of Systems    Medication System Management:  Affordability/Accessibility: Patient is able to afford mediations, would like to fill Trulicity for 3 month supply, will ask pharmacy again since the shortage has been resolved, if not can always fill at a  pharmacy  Adherence/Organization: No barriers to adherence or organization  Adverse Effects: No adverse effects noted    Objective     There were no vitals taken for this visit.     LAB  Lab Results   Component Value Date    BILITOT 0.6 08/12/2024    CALCIUM 9.3 10/30/2024    CO2 27 10/30/2024     10/30/2024    CREATININE 0.67 10/30/2024    GLUCOSE 138 (H) 10/30/2024    ALKPHOS 73 08/12/2024    K 4.1 10/30/2024    PROT 7.5 08/12/2024     10/30/2024    AST 31 08/12/2024    ALT 28 08/12/2024    BUN 14 10/30/2024    ANIONGAP 13 10/30/2024    MG 2.04 10/30/2024    PHOS 3.5 10/28/2024     ALBUMIN 4.5 08/12/2024    GFRF >90 09/22/2023     Lab Results   Component Value Date    TRIG 142 08/12/2024    CHOL 146 08/12/2024    LDLCALC 78 08/12/2024    HDL 40.0 (L) 08/12/2024     Lab Results   Component Value Date    HGBA1C 6.7 (A) 11/08/2024         Current Outpatient Medications on File Prior to Visit   Medication Sig Dispense Refill    acetaminophen (Tylenol) 500 mg tablet Take 2 tablets (1,000 mg) by mouth every 6 hours if needed for mild pain (1 - 3).      albuterol (Proventil HFA) 90 mcg/actuation inhaler Inhale 2 puffs every 4 hours if needed for wheezing or shortness of breath. 6.7 g 5    dulaglutide 3 mg/0.5 mL pen injector Inject 3 mg under the skin 1 (one) time per week. 6 mL 1    lisinopril 5 mg tablet Take 1 tablet (5 mg) by mouth once daily. 90 tablet 1    metFORMIN  mg 24 hr tablet Take 1 tablet (500 mg) by mouth 2 times a day. 180 tablet 1    psyllium husk (METAMUCIL ORAL) Take by mouth.      rosuvastatin (Crestor) 5 mg tablet Take 1 tablet (5 mg) by mouth once daily. 90 tablet 1    Wixela Inhub 500-50 mcg/dose diskus inhaler Inhale 1 puff 2 times a day. 60 each 3    [DISCONTINUED] guaiFENesin (Mucinex) 600 mg 12 hr tablet Take 2 tablets (1,200 mg) by mouth 2 times a day. Do not crush, chew, or split.       No current facility-administered medications on file prior to visit.        Assessment/Plan   Problem List Items Addressed This Visit       Type 2 diabetes mellitus without complication, without long-term current use of insulin (Multi)       Continue all meds under the continuation of care with the referring provider and clinical pharmacy team.    Provided medication counseling and answered patient's medication questions.     Tammy Mahoney, PharmD, BCPS    Verbal consent to manage patient's drug therapy was obtained from [the patient and/or an individual authorized to act on behalf of a patient]. They were informed they may decline to participate or withdraw from participation in  pharmacy services at any time.

## 2024-12-11 ENCOUNTER — OFFICE VISIT (OUTPATIENT)
Dept: PULMONOLOGY | Facility: CLINIC | Age: 64
End: 2024-12-11
Payer: COMMERCIAL

## 2024-12-11 VITALS
TEMPERATURE: 98 F | HEIGHT: 63 IN | HEART RATE: 81 BPM | BODY MASS INDEX: 30.48 KG/M2 | SYSTOLIC BLOOD PRESSURE: 114 MMHG | OXYGEN SATURATION: 99 % | RESPIRATION RATE: 16 BRPM | WEIGHT: 172 LBS | DIASTOLIC BLOOD PRESSURE: 66 MMHG

## 2024-12-11 DIAGNOSIS — C34.32 MALIGNANT NEOPLASM OF LOWER LOBE OF LEFT LUNG (MULTI): ICD-10-CM

## 2024-12-11 DIAGNOSIS — J44.9 CHRONIC OBSTRUCTIVE PULMONARY DISEASE, UNSPECIFIED COPD TYPE (MULTI): Primary | ICD-10-CM

## 2024-12-11 PROCEDURE — 3074F SYST BP LT 130 MM HG: CPT | Performed by: STUDENT IN AN ORGANIZED HEALTH CARE EDUCATION/TRAINING PROGRAM

## 2024-12-11 PROCEDURE — 99214 OFFICE O/P EST MOD 30 MIN: CPT | Performed by: STUDENT IN AN ORGANIZED HEALTH CARE EDUCATION/TRAINING PROGRAM

## 2024-12-11 PROCEDURE — 3008F BODY MASS INDEX DOCD: CPT | Performed by: STUDENT IN AN ORGANIZED HEALTH CARE EDUCATION/TRAINING PROGRAM

## 2024-12-11 PROCEDURE — 3048F LDL-C <100 MG/DL: CPT | Performed by: STUDENT IN AN ORGANIZED HEALTH CARE EDUCATION/TRAINING PROGRAM

## 2024-12-11 PROCEDURE — 1036F TOBACCO NON-USER: CPT | Performed by: STUDENT IN AN ORGANIZED HEALTH CARE EDUCATION/TRAINING PROGRAM

## 2024-12-11 PROCEDURE — 3044F HG A1C LEVEL LT 7.0%: CPT | Performed by: STUDENT IN AN ORGANIZED HEALTH CARE EDUCATION/TRAINING PROGRAM

## 2024-12-11 PROCEDURE — 3078F DIAST BP <80 MM HG: CPT | Performed by: STUDENT IN AN ORGANIZED HEALTH CARE EDUCATION/TRAINING PROGRAM

## 2024-12-11 ASSESSMENT — ENCOUNTER SYMPTOMS
CHILLS: 0
ARTHRALGIAS: 0
UNEXPECTED WEIGHT CHANGE: 0
ABDOMINAL PAIN: 0
PALPITATIONS: 0
ABDOMINAL DISTENTION: 0
FEVER: 0

## 2024-12-11 ASSESSMENT — COPD QUESTIONNAIRES
CAT_TOTALSCORE: 23
QUESTION5_HOMEACTIVITIES: 0 - I AM NOT LIMITED DOING ANY ACTIVITIES AT HOME
QUESTION7_SLEEPQUALITY: 1
QUESTION8_ENERGYLEVEL: 3
QUESTION6_LEAVINGHOUSE: 2
QUESTION2_CHESTPHLEGM: 5 - MY CHEST IS COMPLETELY FULL OF PHLEGM (MUCUS)
QUESTION4_WALKINCLINE: 5 - WHEN I WALK UP A HILL OR ONE FLIGHT OF STAIRS I AM VERY BREATHLESS
QUESTION3_CHESTTIGHTNESS: 5 - MY CHEST FEELS VERY TIGHT
QUESTION1_COUGHFREQUENCY: 2

## 2024-12-11 NOTE — LETTER
December 11, 2024     Kathia Greene PA-C  [address] [facility] [address]    Patient: Clara Rashid   YOB: 1960   Date of Visit: 12/11/2024       Dear Dr. Kathia Greene PA-C:    Thank you for referring Clara Rashid to me for evaluation. Below are my notes for this consultation.  If you have questions, please do not hesitate to call me. I look forward to following your patient along with you.       Sincerely,     Velvet Oneil MD      CC: Phillip Garrido MD  ______________________________________________________________________________________      Department of Medicine I Division of Pulmonary, Critical Care, and Sleep Medicine   [address] [place] [address]  Phone: [phone]  Fax: [phone]    History of Present Illness   Clara Rashid is a 64 y.o. female presenting with COPD.    Had LLL wedge on 10/28/2024 for LLL nodule---positive for adenocarcinoma, nodes were negative   Here to establish care for COPD    Has had COPD --10 years  Has never been hospitalized for COPD   Stopped having recurrent bronchitis after she got a pneumonia shot   Daily symptoms:  dyspnea walking up and down the steps; did go to a cardio class and did fine this past Monday.  Triggers: Cold air.  Cleaning products   Started to noticed increased phlegm and cough--clear--no blood   Quit smoking in 2022--smoked for many years   Denies any wheezing   Pulmonary medications: wixela, albuterol        Review of Systems  Review of Systems   Constitutional:  Negative for chills, fever and unexpected weight change.   Respiratory:          As per hpi   Cardiovascular:  Negative for chest pain, palpitations and leg swelling.   Gastrointestinal:  Negative for abdominal distention and abdominal pain.   Musculoskeletal:  Negative for arthralgias and gait problem.   Skin:  Negative for rash.     All other review of systems are negative and/or non-contributory.    Past Medical  History   She has a past medical history of Bladder cancer (Multi), antineoplastic chemo (2022), Personal history of other diseases of the respiratory system (07/08/2013), and Personal history of other diseases of the respiratory system (07/08/2013).  History of bladder cancer--has had 3 TURP surgery, s/p BCG   LLL wedge     Immunizations     Immunization History   Administered Date(s) Administered   • COVID-19, mRNA, LNP-S, PF, 30 mcg/0.3 mL dose 12/30/2021   • Flu vaccine (IIV4), preservative free *Check age/dose* 10/03/2020, 10/25/2023   • Flu vaccine, quadrivalent, no egg protein, age 6 month or greater (FLUCELVAX) 10/12/2021   • Flu vaccine, trivalent, preservative free, age 6 months and greater (Fluarix/Fluzone/Flulaval) 11/08/2024   • Influenza, Seasonal, Quadrivalent, Adjuvanted 10/20/2022   • Pfizer Purple Cap SARS-CoV-2 03/20/2021, 04/17/2021   • Pneumococcal conjugate vaccine, 13-valent (PREVNAR 13) 12/17/2018   • Pneumococcal polysaccharide vaccine, 23-valent, age 2 years and older (PNEUMOVAX 23) 06/11/2019   • Tdap vaccine, age 7 year and older (BOOSTRIX, ADACEL) 10/20/2022   • Zoster vaccine, recombinant, adult (SHINGRIX) 01/20/2023, 04/19/2023       Medications and Allergies     Current Outpatient Medications   Medication Instructions   • acetaminophen (TYLENOL) 1,000 mg, Every 6 hours PRN   • albuterol (Proventil HFA) 90 mcg/actuation inhaler 2 puffs, inhalation, Every 4 hours PRN   • dulaglutide (TRULICITY) 3 mg, subcutaneous, Once Weekly   • lisinopril 5 mg, oral, Daily   • metFORMIN XR (GLUCOPHAGE-XR) 500 mg, oral, 2 times daily   • psyllium husk (METAMUCIL ORAL) Take by mouth.   • rosuvastatin (CRESTOR) 5 mg, oral, Daily   • Wixela Inhub 500-50 mcg/dose diskus inhaler 1 puff, inhalation, 2 times daily RT      Bactrim [sulfamethoxazole-trimethoprim]    Social History   She reports that she quit smoking about 2 years ago. Her smoking use included cigarettes. She started smoking about 45 years ago.  She has a 63.8 pack-year smoking history. She has been exposed to tobacco smoke. She has never used smokeless tobacco. She reports current alcohol use of about 2.0 standard drinks of alcohol per week. She reports that she does not currently use drugs after having used the following drugs: Marijuana.    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    Physical Exam     Vitals:    24 0852   BP: 114/66   Pulse: 81   Resp: 16   Temp: 36.7 °C (98 °F)   SpO2: 99%       Physical Exam  Vitals reviewed.   Constitutional:       General: She is awake.   Cardiovascular:      Rate and Rhythm: Normal rate and regular rhythm.   Pulmonary:      Effort: Pulmonary effort is normal.      Breath sounds: Normal breath sounds.   Neurological:      Mental Status: She is alert and oriented to person, place, and time.   Psychiatric:         Attention and Perception: Attention and perception normal.         Behavior: Behavior normal.          Results   Pulmonary Function Tests:  10/16/2024   FEV1/FVC: 67  post FEV1: 72% pred T% pred DLCO: 73% pred     Chest Radiograph:  XR chest 2 views 2024    Narrative  Interpreted By:  Flory Vasquez,  STUDY:  XR CHEST 2 VIEWS;  2024 2:31 pm    INDICATION:  Signs/Symptoms:s/p VATS.    COMPARISON:  10/30/2024    ACCESSION NUMBER(S):  [id number]    ORDERING CLINICIAN:  JERRY DEVI    FINDINGS:  Heart is normal in size.    There is no obvious consolidation. There is blunting of  "left  costophrenic angle.    The mediastinum is unremarkable. There are degenerative changes of  the spine.    COMPARISON OF FINDING:  The left lung base is improved in appearance. The pneumothorax  suggested previously is not obvious currently.    Impression  No acute cardiopulmonary disease.    MACRO:  none    Signed by: Flory Vasquez 11/8/2024 4:40 PM  Dictation workstation:   [id number]      Chest CT Scan:  No results found for this or any previous visit from the past 365 days.       ECHO:  No results found for this or any previous visit from the past 365 days.       Labs:  Lab Results   Component Value Date    WBC 11.0 10/30/2024    HGB 13.5 10/30/2024    HCT 40.8 10/30/2024    MCV 98 10/30/2024     10/30/2024       Lab Results   Component Value Date    CREATININE 0.67 10/30/2024    BUN 14 10/30/2024     10/30/2024    K 4.1 10/30/2024     10/30/2024    CO2 27 10/30/2024        No results found for: \"NORM\", \"RF\", \"SEDRATE\"     IgE: No results found for: \"IGE\"   Respiratory Allergy Panel:  AEC:   Eosinophils Absolute (x10*3/uL)   Date Value   10/28/2024 0.01     Eosinophils % (%)   Date Value   10/28/2024 0.1       Cultures:  No results found for: \"AFBCX\"   No results found for: \"RESPCULTCYFI\"    No results found for the last 90 days.  C    10/28/2024   Surgical Pathology   A. LUNG, LEFT LOWER LOBE, WEDGE EXCISION:              Invasive non-small cell carcinoma consistent with poorly differentiated adenocarcinoma, see comment.              Emphysematous change with intra-alveolar macrophages.              Patchy interstitial fibrosis.     Comment: The neoplasm is immunoreactive to TTF-1 (6K2N9-5) while is non-immunoreactive to p40. These findings support the above diagnosis. The results of PD-L1 immunostain and NGS studies will be reported in addenda.     B. LYMPH NODE STATION 9L (1), EXCISION:              Negative for malignancy.     C. LYMPH NODE STATION 8L (1), EXCISION:              " Negative for malignancy.     D. LYMPH NODE STATION 7L (1), EXCISION:              Negative for malignancy.     E. LYMPH NODE STATION 10L, EXCISION:              Fragments of lymph node, negative for malignancy.     F. LYMPH NODE STATION 5L, EXCISION:              Fragments of lymph node, negative for malignancy.     G. LUNG, LEFT LOWER LOBE, SUPERIOR SEGMENT, SEGMENTECTOMY:              Emphysematous change with intra-alveolar macrophages, negative for malignancy.              Patchy interstitial fibrosis.              Peribronchial lymph nodes (2), negative for malignancy.          Assessment and Plan       Clara Rashid is a 64 y.o. year old female patient with COPD ? Asthma recently diagnosed NSCLC of LLL (s/p wedge, adenoCA, nodes negative; Stage 1A2), history of bladder ca s/p TURB and intravesicular BCG here to establish care for her COPD and possible asthma.  Symptoms appear controlled on her current regimen of wixela.  Rarely has to use albuterol inh.  Is having some minor Left sided pain from surgical site.  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  TLC and DLCO appear normal on recent PFT.     COPD/Asthma/ lung fibrosis  -since pt has controlled symptoms will continue wixela and prn albuterol   -continue to stay active  -to watch for progressive symptoms of dyspnea     NSCLC s/p LLL wedge--following with thoracic   -plan for 6 month follow up CT as per thoracic surgery        RTC in 1 year or sooner       Velvet Oneil MD  12/11/2024

## 2024-12-11 NOTE — PATIENT INSTRUCTIONS
Thank you for visiting the Pulmonary Clinic today.   Your breathing medications: continue wixela and as needed albuterol   Return in 1 year   If you have questions or concerns, call [phone] (option 4)

## 2024-12-11 NOTE — PROGRESS NOTES
Department of Medicine I Division of Pulmonary, Critical Care, and Sleep Medicine   [id number] [place] [address] [address] [place] [address]  Phone: [phone]  Fax: [phone]    History of Present Illness   Clara Rashid is a 64 y.o. female presenting with COPD.    Had LLL wedge on 10/28/2024 for LLL nodule---positive for adenocarcinoma, nodes were negative   Here to establish care for COPD    Has had COPD --10 years  Has never been hospitalized for COPD   Stopped having recurrent bronchitis after she got a pneumonia shot   Daily symptoms:  dyspnea walking up and down the steps; did go to a cardio class and did fine this past Monday.  Triggers: Cold air.  Cleaning products   Started to noticed increased phlegm and cough--clear--no blood   Quit smoking in 2022--smoked for many years   Denies any wheezing   Pulmonary medications: wixela, albuterol        Review of Systems  Review of Systems   Constitutional:  Negative for chills, fever and unexpected weight change.   Respiratory:          As per hpi   Cardiovascular:  Negative for chest pain, palpitations and leg swelling.   Gastrointestinal:  Negative for abdominal distention and abdominal pain.   Musculoskeletal:  Negative for arthralgias and gait problem.   Skin:  Negative for rash.     All other review of systems are negative and/or non-contributory.    Past Medical History   She has a past medical history of Bladder cancer (Multi), antineoplastic chemo (2022), Personal history of other diseases of the respiratory system (07/08/2013), and Personal history of other diseases of the respiratory system (07/08/2013).  History of bladder cancer--has had 3 TURP surgery, s/p BCG   LLL wedge     Immunizations     Immunization History   Administered Date(s) Administered    COVID-19, mRNA, LNP-S, PF, 30 mcg/0.3 mL dose 12/30/2021    Flu vaccine (IIV4), preservative free *Check age/dose* 10/03/2020, 10/25/2023    Flu vaccine, quadrivalent, no egg protein, age 6  month or greater (FLUCELVAX) 10/12/2021    Flu vaccine, trivalent, preservative free, age 6 months and greater (Fluarix/Fluzone/Flulaval) 11/08/2024    Influenza, Seasonal, Quadrivalent, Adjuvanted 10/20/2022    Pfizer Purple Cap SARS-CoV-2 03/20/2021, 04/17/2021    Pneumococcal conjugate vaccine, 13-valent (PREVNAR 13) 12/17/2018    Pneumococcal polysaccharide vaccine, 23-valent, age 2 years and older (PNEUMOVAX 23) 06/11/2019    Tdap vaccine, age 7 year and older (BOOSTRIX, ADACEL) 10/20/2022    Zoster vaccine, recombinant, adult (SHINGRIX) 01/20/2023, 04/19/2023       Medications and Allergies     Current Outpatient Medications   Medication Instructions    acetaminophen (TYLENOL) 1,000 mg, Every 6 hours PRN    albuterol (Proventil HFA) 90 mcg/actuation inhaler 2 puffs, inhalation, Every 4 hours PRN    dulaglutide (TRULICITY) 3 mg, subcutaneous, Once Weekly    lisinopril 5 mg, oral, Daily    metFORMIN XR (GLUCOPHAGE-XR) 500 mg, oral, 2 times daily    psyllium husk (METAMUCIL ORAL) Take by mouth.    rosuvastatin (CRESTOR) 5 mg, oral, Daily    Wixela Inhub 500-50 mcg/dose diskus inhaler 1 puff, inhalation, 2 times daily RT      Bactrim [sulfamethoxazole-trimethoprim]    Social History   She reports that she quit smoking about 2 years ago. Her smoking use included cigarettes. She started smoking about 45 years ago. She has a 63.8 pack-year smoking history. She has been exposed to tobacco smoke. She has never used smokeless tobacco. She reports current alcohol use of about 2.0 standard drinks of alcohol per week. She reports that she does not currently use drugs after having used the following drugs: Marijuana.    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    Physical Exam     Vitals:    24 0852   BP: 114/66   Pulse: 81   Resp: 16   Temp: 36.7 °C (98 °F)   SpO2: 99%       Physical Exam  Vitals reviewed.   Constitutional:       General: She is awake.   Cardiovascular:      Rate and Rhythm: Normal rate and regular rhythm.   Pulmonary:      Effort: Pulmonary effort is normal.      Breath sounds: Normal breath sounds.   Neurological:      Mental Status: She is alert and oriented to person, place, and time.   Psychiatric:         Attention and Perception: Attention and perception normal.         Behavior: Behavior normal.          Results   Pulmonary Function Tests:  10/16/2024   FEV1/FVC: 67  post FEV1: 72% pred T% pred DLCO: 73% pred     Chest Radiograph:  XR chest 2 views 2024    Narrative  Interpreted By:  Flory Vasquez,  STUDY:  XR CHEST 2 VIEWS;  2024 2:31 pm    INDICATION:  Signs/Symptoms:s/p VATS.    COMPARISON:  10/30/2024    ACCESSION NUMBER(S):  [id number]    ORDERING CLINICIAN:  JERRY DEVI    FINDINGS:  Heart is normal in size.    There is no obvious consolidation. There is blunting of left  costophrenic angle.    The mediastinum is unremarkable. There are degenerative changes of  the spine.    COMPARISON OF FINDING:  The left lung base is improved in appearance. The pneumothorax  suggested previously is not obvious currently.    Impression  No acute cardiopulmonary disease.    MACRO:  none    Signed by: Flory Vasquez 2024 4:40 PM  Dictation workstation:   [id number]      Chest CT Scan:  No results found for this or any previous visit from the past 365 days.       ECHO:  No results found for this or any previous visit from the past 365 days.       Labs:  Lab Results   Component Value  "Date    WBC 11.0 10/30/2024    HGB 13.5 10/30/2024    HCT 40.8 10/30/2024    MCV 98 10/30/2024     10/30/2024       Lab Results   Component Value Date    CREATININE 0.67 10/30/2024    BUN 14 10/30/2024     10/30/2024    K 4.1 10/30/2024     10/30/2024    CO2 27 10/30/2024        No results found for: \"NORM\", \"RF\", \"SEDRATE\"     IgE: No results found for: \"IGE\"   Respiratory Allergy Panel:  AEC:   Eosinophils Absolute (x10*3/uL)   Date Value   10/28/2024 0.01     Eosinophils % (%)   Date Value   10/28/2024 0.1       Cultures:  No results found for: \"AFBCX\"   No results found for: \"RESPCULTCYFI\"    No results found for the last 90 days.  C    10/28/2024   Surgical Pathology   A. LUNG, LEFT LOWER LOBE, WEDGE EXCISION:              Invasive non-small cell carcinoma consistent with poorly differentiated adenocarcinoma, see comment.              Emphysematous change with intra-alveolar macrophages.              Patchy interstitial fibrosis.     Comment: The neoplasm is immunoreactive to TTF-1 (3Y8H6-2) while is non-immunoreactive to p40. These findings support the above diagnosis. The results of PD-L1 immunostain and NGS studies will be reported in addenda.     B. LYMPH NODE STATION 9L (1), EXCISION:              Negative for malignancy.     C. LYMPH NODE STATION 8L (1), EXCISION:              Negative for malignancy.     D. LYMPH NODE STATION 7L (1), EXCISION:              Negative for malignancy.     E. LYMPH NODE STATION 10L, EXCISION:              Fragments of lymph node, negative for malignancy.     F. LYMPH NODE STATION 5L, EXCISION:              Fragments of lymph node, negative for malignancy.     G. LUNG, LEFT LOWER LOBE, SUPERIOR SEGMENT, SEGMENTECTOMY:              Emphysematous change with intra-alveolar macrophages, negative for malignancy.              Patchy interstitial fibrosis.              Peribronchial lymph nodes (2), negative for malignancy.          Assessment and Plan "       Clara Rashid is a 64 y.o. year old female patient with COPD ? Asthma recently diagnosed NSCLC of LLL (s/p wedge, adenoCA, nodes negative; Stage 1A2), history of bladder ca s/p TURB and intravesicular BCG here to establish care for her COPD and possible asthma.  Symptoms appear controlled on her current regimen of wixela.  Rarely has to use albuterol inh.  Is having some minor Left sided pain from surgical site.  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  TLC and DLCO appear normal on recent PFT.     COPD/Asthma/ lung fibrosis  -since pt has controlled symptoms will continue wixela and prn albuterol   -continue to stay active  -to watch for progressive symptoms of dyspnea     NSCLC s/p LLL wedge--following with thoracic   -plan for 6 month follow up CT as per thoracic surgery        RTC in 1 year or sooner       Velvet Oneil MD  12/11/2024

## 2024-12-12 ENCOUNTER — PATIENT MESSAGE (OUTPATIENT)
Dept: PRIMARY CARE | Facility: CLINIC | Age: 64
End: 2024-12-12
Payer: COMMERCIAL

## 2024-12-12 DIAGNOSIS — E11.9 TYPE 2 DIABETES MELLITUS WITHOUT COMPLICATION, WITHOUT LONG-TERM CURRENT USE OF INSULIN (MULTI): ICD-10-CM

## 2024-12-16 ENCOUNTER — HOSPITAL ENCOUNTER (OUTPATIENT)
Dept: RADIOLOGY | Facility: HOSPITAL | Age: 64
Discharge: HOME | End: 2024-12-16
Payer: COMMERCIAL

## 2024-12-16 DIAGNOSIS — Z12.31 SCREENING MAMMOGRAM FOR BREAST CANCER: ICD-10-CM

## 2024-12-16 PROCEDURE — 77063 BREAST TOMOSYNTHESIS BI: CPT | Performed by: RADIOLOGY

## 2024-12-16 PROCEDURE — 77067 SCR MAMMO BI INCL CAD: CPT | Performed by: RADIOLOGY

## 2024-12-16 PROCEDURE — 77067 SCR MAMMO BI INCL CAD: CPT

## 2024-12-18 PROCEDURE — RXMED WILLOW AMBULATORY MEDICATION CHARGE

## 2024-12-19 ENCOUNTER — APPOINTMENT (OUTPATIENT)
Dept: UROLOGY | Facility: CLINIC | Age: 64
End: 2024-12-19
Payer: COMMERCIAL

## 2024-12-19 VITALS
HEART RATE: 80 BPM | SYSTOLIC BLOOD PRESSURE: 136 MMHG | WEIGHT: 172 LBS | BODY MASS INDEX: 30.47 KG/M2 | TEMPERATURE: 95.3 F | DIASTOLIC BLOOD PRESSURE: 78 MMHG

## 2024-12-19 DIAGNOSIS — C67.9 MALIGNANT NEOPLASM OF URINARY BLADDER, UNSPECIFIED SITE (MULTI): Primary | ICD-10-CM

## 2024-12-19 LAB
POC APPEARANCE, URINE: CLEAR
POC BILIRUBIN, URINE: NEGATIVE
POC BLOOD, URINE: NEGATIVE
POC COLOR, URINE: YELLOW
POC GLUCOSE, URINE: NEGATIVE MG/DL
POC KETONES, URINE: ABNORMAL MG/DL
POC LEUKOCYTES, URINE: NEGATIVE
POC NITRITE,URINE: NEGATIVE
POC PH, URINE: 5 PH
POC PROTEIN, URINE: NEGATIVE MG/DL
POC SPECIFIC GRAVITY, URINE: 1.02
POC UROBILINOGEN, URINE: 0.2 EU/DL

## 2024-12-19 PROCEDURE — 52000 CYSTOURETHROSCOPY: CPT | Performed by: STUDENT IN AN ORGANIZED HEALTH CARE EDUCATION/TRAINING PROGRAM

## 2024-12-19 PROCEDURE — 81003 URINALYSIS AUTO W/O SCOPE: CPT | Performed by: STUDENT IN AN ORGANIZED HEALTH CARE EDUCATION/TRAINING PROGRAM

## 2024-12-19 ASSESSMENT — PAIN SCALES - GENERAL: PAINLEVEL_OUTOF10: 0-NO PAIN

## 2024-12-19 NOTE — PROGRESS NOTES
HPI:  Proc (3/8/22): TURBT large, R URS, stent placement, retrograde pyelogram  Path: Papillary urothelial carcinoma, low grade with focal high grade cytologic features, focal area suspicious for lamina propria invasion (TURBT), non-invasive papillary urothelial carcinoma, low grade (left bladder wall biopsy), partially-denuded urothelial mucosa with inflammation and focal atypia that does not meet the threshold for carcinoma in situ (deep bladder biopsy)     Proc (4/5/22): TURBT medium, stent exchange  Path: Partially denuded urothelial mucosa with chronic inflammation and proliferative changes, favor reactive, changes consistent with previous procedure, muscularis propria negative for carcinoma (TURBT), smooth muscle and adipose tissue negative for carcinoma (deep margin tumor biopsy)     Proc (9/6/22): TURBT small  Path: focal high-grade and predominantly low-grade papillary urothelial carcinoma, no definitive lamina propria invasion, muscularis propria uninvolved by carcinoma    Proc (8/20/24): TURBT small with bluelight cystoscopy  Path: non-invasive papillary urothelial carcinoma, low-grade     BCG induction 2x     Clara Rashid is a 64 y.o. female referred by Dr. Mcdonald with gross hematuria. Patient had a cystoscopy with Dr. Mcdonald (2/23/22) which showed a large tumor on the R lateral wall, and a small tumor on the L trigone. CT Urogram (2/28/22) with two synchronous urinary bladder masses, larger mass involves/surrounds right UVJ without obstructing it, smaller mass near/involving portion of left UVJ without obstructing it, a few nonspecific <6 mm solid indeterminate bibasilar lung nodules. S/p TURBT large (3/8/22) with pathology showing papillary urothelial carcinoma, low grade with focal high grade cytologic features, focal area suspicious for lamina propria invasion. S/p TURBT medium (4/5/22) with pathology showing partially denuded urothelial mucosa with chronic inflammation and proliferative changes  consistent with previous procedure, muscularis propria, smooth muscle and adipose tissue negative for carcinoma. Completed BCG. S/p TURBT small (9/6/22) with pathology showing focal high-grade and predominantly low-grade papillary urothelial carcinoma, no definitive lamina propria invasion, muscularis propria uninvolved by carcinoma. CT Urogram (9/29/23) showed no enhancing mass or filling defect in the bilateral ureters or bladder to suggest residual/recurrent disease and no evidence of distant disease. CT chest (9/29/23) showed few indeterminate less than 6 mm pulmonary nodules, otherwise, no suspicious pulmonary nodule or thoracic adenopathy. S/p TURBT small with bluelight cystoscopy (8/20/24) with pathology showing non-invasive papillary urothelial carcinoma, low-grade. Patient s/p lung resection (10/28/24) with pathology showing invasive non-small cell carcinoma, all resected and does not need RT or chemotherapy. Presents for cystoscopy. Denies hematuria. Doing well.      Smoker x 43 years. Has quit smoking tobacco in February 22'. Continues to smoke marijuana   SHx: Mohs, rotator cuff, 2 C-sections.   FHx: Grandmother with bladder cancer (age 90). Did not die of disease.     Cre: 0.70 (8/12/24), 0.83 (2/23/22)     CT Urogram (2/28/22): Two synchronous urinary bladder masses. The larger mass involves/surrounds the right UVJ without obstructing it. The smaller mass is near/involving a portion of the left UVJ without obstructing it. A few nonspecific <6 mm solid indeterminate bibasilar lung nodules.     CT Urogram (9/29/23): no enhancing mass or filling defect in the bilateral ureters or bladder to suggest residual/recurrent disease and no evidence of distant disease.    CT chest (9/29/23): few indeterminate less than 6 mm pulmonary nodules, otherwise, no suspicious pulmonary nodule or thoracic adenopathy.    Review of Systems:  All systems reviewed. Anything negative noted in the HPI.    Physical Exam:  Vitals  signs reviewed.  Constitutional:      Appearance: Well-developed.  HENT:     Head: Normocephalic and atraumatic.  Neck:     Musculoskeletal: Normal range of motion.  Pulmonary:     Effort: Pulmonary effort is normal.  Musculoskeletal: Normal range of motion.  Skin:     General: Skin is warm and dry.  Neurological:     Mental Status: Alert and oriented to person, place, and time.  Psychiatric:        Behavior: Behavior normal.        Thought Content: Thought content normal.        Judgment: Judgment normal.     Cystoscopy    Date/Time: 12/19/2024 9:13 AM    Performed by: Fazal Mendoza MD  Authorized by: Fazal Mendoza MD      Post-procedure:     Patient tolerance: Patient tolerated the procedure well with no immediate complications      Comments:      No mucosal lesions, old scar.         Assessment/Plan   Clara Rashid is a 64 y.o. female referred by Dr. Mcdonald with gross hematuria. Patient had a cystoscopy with Dr. Mcdonald (2/23/22) which showed a large tumor on the R lateral wall, and a small tumor on the L trigone. CT Urogram (2/28/22) with two synchronous urinary bladder masses, larger mass involves/surrounds right UVJ without obstructing it, smaller mass near/involving portion of left UVJ without obstructing it, a few nonspecific <6 mm solid indeterminate bibasilar lung nodules. S/p TURBT large (3/8/22) with pathology showing papillary urothelial carcinoma, low grade with focal high grade cytologic features, focal area suspicious for lamina propria invasion. S/p TURBT medium (4/5/22) with pathology showing partially denuded urothelial mucosa with chronic inflammation and proliferative changes consistent with previous procedure, muscularis propria, smooth muscle and adipose tissue negative for carcinoma. Completed BCG. S/p TURBT small (9/6/22) with pathology showing focal high-grade and predominantly low-grade papillary urothelial carcinoma, no definitive lamina propria invasion, muscularis propria  uninvolved by carcinoma. CT Urogram (9/29/23) showed no enhancing mass or filling defect in the bilateral ureters or bladder to suggest residual/recurrent disease and no evidence of distant disease. CT chest (9/29/23) showed few indeterminate less than 6 mm pulmonary nodules, otherwise, no suspicious pulmonary nodule or thoracic adenopathy. S/p TURBT small with bluelight cystoscopy (8/20/24) with pathology showing non-invasive papillary urothelial carcinoma, low-grade. Patient s/p lung resection (10/28/24) with pathology showing invasive non-small cell carcinoma, all resected and does not need RT or chemotherapy. Denies hematuria. Doing well. Management options including risks, benefits and alternatives discussed at length and all questions answered. Patient prefers to proceed with follow-up in 3mos for cystoscopy.             Scribe Attestation  By signing my name below, Mercedes FINE Scribe   attest that this documentation has been prepared under the direction and in the presence of Fazal Mendoza MD.

## 2024-12-23 ENCOUNTER — PHARMACY VISIT (OUTPATIENT)
Dept: PHARMACY | Facility: CLINIC | Age: 64
End: 2024-12-23
Payer: MEDICARE

## 2025-01-03 DIAGNOSIS — Z98.890 HISTORY OF LUNG SURGERY: ICD-10-CM

## 2025-01-03 DIAGNOSIS — E11.9 TYPE 2 DIABETES MELLITUS WITHOUT COMPLICATION, WITHOUT LONG-TERM CURRENT USE OF INSULIN (MULTI): ICD-10-CM

## 2025-01-03 RX ORDER — METFORMIN HYDROCHLORIDE 500 MG/1
500 TABLET, EXTENDED RELEASE ORAL 2 TIMES DAILY
Qty: 180 TABLET | Refills: 1 | Status: SHIPPED | OUTPATIENT
Start: 2025-01-03

## 2025-01-17 ENCOUNTER — PATIENT OUTREACH (OUTPATIENT)
Dept: PRIMARY CARE | Facility: CLINIC | Age: 65
End: 2025-01-17
Payer: COMMERCIAL

## 2025-01-17 DIAGNOSIS — E78.2 MIXED HYPERLIPIDEMIA: ICD-10-CM

## 2025-01-17 DIAGNOSIS — I10 ESSENTIAL (PRIMARY) HYPERTENSION: ICD-10-CM

## 2025-01-17 DIAGNOSIS — J41.0 SIMPLE CHRONIC BRONCHITIS (MULTI): ICD-10-CM

## 2025-01-17 RX ORDER — LISINOPRIL 5 MG/1
5 TABLET ORAL DAILY
Qty: 90 TABLET | Refills: 1 | Status: SHIPPED | OUTPATIENT
Start: 2025-01-17

## 2025-01-17 RX ORDER — ROSUVASTATIN CALCIUM 5 MG/1
5 TABLET, COATED ORAL DAILY
Qty: 90 TABLET | Refills: 1 | Status: SHIPPED | OUTPATIENT
Start: 2025-01-17

## 2025-01-17 RX ORDER — FLUTICASONE PROPIONATE AND SALMETEROL 500; 50 UG/1; UG/1
1 POWDER RESPIRATORY (INHALATION) 2 TIMES DAILY
Qty: 60 EACH | Refills: 0 | Status: SHIPPED | OUTPATIENT
Start: 2025-01-17

## 2025-01-17 NOTE — PROGRESS NOTES
Unable to reach patient for a F/U call       L/M Patient encouraged to call providers for any questions concerns or change in condition

## 2025-01-22 ENCOUNTER — OFFICE VISIT (OUTPATIENT)
Dept: PRIMARY CARE | Facility: CLINIC | Age: 65
End: 2025-01-22
Payer: COMMERCIAL

## 2025-01-22 ENCOUNTER — HOSPITAL ENCOUNTER (OUTPATIENT)
Dept: RADIOLOGY | Facility: HOSPITAL | Age: 65
Discharge: HOME | End: 2025-01-22
Payer: COMMERCIAL

## 2025-01-22 ENCOUNTER — LAB (OUTPATIENT)
Dept: LAB | Facility: LAB | Age: 65
End: 2025-01-22
Payer: COMMERCIAL

## 2025-01-22 VITALS
OXYGEN SATURATION: 97 % | RESPIRATION RATE: 16 BRPM | SYSTOLIC BLOOD PRESSURE: 115 MMHG | DIASTOLIC BLOOD PRESSURE: 83 MMHG | HEART RATE: 87 BPM | WEIGHT: 178 LBS | BODY MASS INDEX: 31.54 KG/M2 | HEIGHT: 63 IN

## 2025-01-22 DIAGNOSIS — R91.8 LUNG NODULES: Primary | ICD-10-CM

## 2025-01-22 DIAGNOSIS — J44.9 CHRONIC OBSTRUCTIVE PULMONARY DISEASE, UNSPECIFIED COPD TYPE (MULTI): ICD-10-CM

## 2025-01-22 DIAGNOSIS — N94.89 FEMALE PELVIC CONGESTION SYNDROME: ICD-10-CM

## 2025-01-22 DIAGNOSIS — K92.1 HEMATOCHEZIA: ICD-10-CM

## 2025-01-22 DIAGNOSIS — J84.178 OTHER INTERSTITIAL PULMONARY DISEASES WITH FIBROSIS IN DISEASES CLASSIFIED ELSEWHERE: ICD-10-CM

## 2025-01-22 DIAGNOSIS — E11.9 TYPE 2 DIABETES MELLITUS WITHOUT COMPLICATION, WITHOUT LONG-TERM CURRENT USE OF INSULIN (MULTI): ICD-10-CM

## 2025-01-22 DIAGNOSIS — C34.32 MALIGNANT NEOPLASM OF LOWER LOBE OF LEFT LUNG (MULTI): ICD-10-CM

## 2025-01-22 DIAGNOSIS — R19.7 DIARRHEA, UNSPECIFIED TYPE: ICD-10-CM

## 2025-01-22 DIAGNOSIS — R14.0 BLOATING: ICD-10-CM

## 2025-01-22 DIAGNOSIS — K92.1 HEMATOCHEZIA: Primary | ICD-10-CM

## 2025-01-22 LAB
ALBUMIN SERPL BCP-MCNC: 4.4 G/DL (ref 3.4–5)
ALP SERPL-CCNC: 58 U/L (ref 33–136)
ALT SERPL W P-5'-P-CCNC: 22 U/L (ref 7–45)
ANION GAP SERPL CALCULATED.3IONS-SCNC: 13 MMOL/L (ref 10–20)
AST SERPL W P-5'-P-CCNC: 26 U/L (ref 9–39)
BASOPHILS # BLD AUTO: 0.08 X10*3/UL (ref 0–0.1)
BASOPHILS NFR BLD AUTO: 1 %
BILIRUB SERPL-MCNC: 0.7 MG/DL (ref 0–1.2)
BUN SERPL-MCNC: 6 MG/DL (ref 6–23)
CALCIUM SERPL-MCNC: 9.8 MG/DL (ref 8.6–10.3)
CHLORIDE SERPL-SCNC: 103 MMOL/L (ref 98–107)
CO2 SERPL-SCNC: 28 MMOL/L (ref 21–32)
CREAT SERPL-MCNC: 0.67 MG/DL (ref 0.5–1.05)
EGFRCR SERPLBLD CKD-EPI 2021: >90 ML/MIN/1.73M*2
EOSINOPHIL # BLD AUTO: 0.22 X10*3/UL (ref 0–0.7)
EOSINOPHIL NFR BLD AUTO: 2.7 %
ERYTHROCYTE [DISTWIDTH] IN BLOOD BY AUTOMATED COUNT: 11.9 % (ref 11.5–14.5)
GLUCOSE SERPL-MCNC: 110 MG/DL (ref 74–99)
HCT VFR BLD AUTO: 43.1 % (ref 36–46)
HGB BLD-MCNC: 14.2 G/DL (ref 12–16)
IMM GRANULOCYTES # BLD AUTO: 0.16 X10*3/UL (ref 0–0.7)
IMM GRANULOCYTES NFR BLD AUTO: 2 % (ref 0–0.9)
LYMPHOCYTES # BLD AUTO: 2.25 X10*3/UL (ref 1.2–4.8)
LYMPHOCYTES NFR BLD AUTO: 27.4 %
MCH RBC QN AUTO: 32.2 PG (ref 26–34)
MCHC RBC AUTO-ENTMCNC: 32.9 G/DL (ref 32–36)
MCV RBC AUTO: 98 FL (ref 80–100)
MONOCYTES # BLD AUTO: 0.76 X10*3/UL (ref 0.1–1)
MONOCYTES NFR BLD AUTO: 9.3 %
NEUTROPHILS # BLD AUTO: 4.73 X10*3/UL (ref 1.2–7.7)
NEUTROPHILS NFR BLD AUTO: 57.6 %
NRBC BLD-RTO: 0 /100 WBCS (ref 0–0)
PLATELET # BLD AUTO: 302 X10*3/UL (ref 150–450)
POTASSIUM SERPL-SCNC: 4.7 MMOL/L (ref 3.5–5.3)
PROT SERPL-MCNC: 7.4 G/DL (ref 6.4–8.2)
RBC # BLD AUTO: 4.41 X10*6/UL (ref 4–5.2)
SODIUM SERPL-SCNC: 139 MMOL/L (ref 136–145)
WBC # BLD AUTO: 8.2 X10*3/UL (ref 4.4–11.3)

## 2025-01-22 PROCEDURE — 4010F ACE/ARB THERAPY RXD/TAKEN: CPT

## 2025-01-22 PROCEDURE — 1036F TOBACCO NON-USER: CPT

## 2025-01-22 PROCEDURE — 3008F BODY MASS INDEX DOCD: CPT

## 2025-01-22 PROCEDURE — 80053 COMPREHEN METABOLIC PANEL: CPT

## 2025-01-22 PROCEDURE — 3074F SYST BP LT 130 MM HG: CPT

## 2025-01-22 PROCEDURE — 2550000001 HC RX 255 CONTRASTS

## 2025-01-22 PROCEDURE — 99214 OFFICE O/P EST MOD 30 MIN: CPT

## 2025-01-22 PROCEDURE — 85025 COMPLETE CBC W/AUTO DIFF WBC: CPT

## 2025-01-22 PROCEDURE — 74177 CT ABD & PELVIS W/CONTRAST: CPT | Performed by: RADIOLOGY

## 2025-01-22 PROCEDURE — 3079F DIAST BP 80-89 MM HG: CPT

## 2025-01-22 PROCEDURE — 74177 CT ABD & PELVIS W/CONTRAST: CPT

## 2025-01-22 RX ADMIN — IOHEXOL 75 ML: 350 INJECTION, SOLUTION INTRAVENOUS at 11:21

## 2025-01-22 ASSESSMENT — ENCOUNTER SYMPTOMS
OCCASIONAL FEELINGS OF UNSTEADINESS: 0
DEPRESSION: 0
LOSS OF SENSATION IN FEET: 0

## 2025-01-22 ASSESSMENT — COLUMBIA-SUICIDE SEVERITY RATING SCALE - C-SSRS
6. HAVE YOU EVER DONE ANYTHING, STARTED TO DO ANYTHING, OR PREPARED TO DO ANYTHING TO END YOUR LIFE?: NO
2. HAVE YOU ACTUALLY HAD ANY THOUGHTS OF KILLING YOURSELF?: NO
1. IN THE PAST MONTH, HAVE YOU WISHED YOU WERE DEAD OR WISHED YOU COULD GO TO SLEEP AND NOT WAKE UP?: NO

## 2025-01-22 ASSESSMENT — PAIN SCALES - GENERAL: PAINLEVEL_OUTOF10: 0-NO PAIN

## 2025-01-22 NOTE — PROGRESS NOTES
"Subjective   Patient ID: Clara Rashid is a 64 y.o. female who presents for stomach issues.    HPI   Admits to nausea, diarrhea that has progressed to gold colored sand textured explosive diarrhea with mucous especially once she eats and drinks, multiple episodes per day admits to bloating- unable to button her pants.  Ilsa 25 plus years ago.   Blood x3   This has been ongoing since mid December.     Review of Systems  Review of Systems negative except as noted in HPI and Chief complaint.    Current Outpatient Medications:     acetaminophen (Tylenol) 500 mg tablet, Take 2 tablets (1,000 mg) by mouth every 6 hours if needed for mild pain (1 - 3)., Disp: , Rfl:     albuterol (Proventil HFA) 90 mcg/actuation inhaler, Inhale 2 puffs every 4 hours if needed for wheezing or shortness of breath., Disp: 6.7 g, Rfl: 5    dulaglutide (Trulicity) 3 mg/0.5 mL injection, Inject 3 mg under the skin 1 (one) time per week., Disp: 6 mL, Rfl: 2    lisinopril 5 mg tablet, TAKE 1 TABLET ONCE DAILY, Disp: 90 tablet, Rfl: 1    metFORMIN  mg 24 hr tablet, TAKE 1 TABLET TWICE A DAY, Disp: 180 tablet, Rfl: 1    psyllium husk (METAMUCIL ORAL), Take by mouth., Disp: , Rfl:     rosuvastatin (Crestor) 5 mg tablet, TAKE 1 TABLET ONCE DAILY, Disp: 90 tablet, Rfl: 1    Wixela Inhub 500-50 mcg/dose diskus inhaler, USE 1 INHALATION ORALLY    TWICE DAILY, Disp: 60 each, Rfl: 0    Objective   /83 (BP Location: Left arm, Patient Position: Sitting, BP Cuff Size: Adult)   Pulse 87   Resp 16   Ht 1.6 m (5' 3\")   Wt 80.7 kg (178 lb)   SpO2 97%   BMI 31.53 kg/m²     Physical Exam  Vitals reviewed.   Cardiovascular:      Rate and Rhythm: Normal rate.   Abdominal:      General: There is distension.   Musculoskeletal:      Cervical back: Normal range of motion.   Neurological:      Mental Status: She is alert.         Assessment/Plan   Diagnoses and all orders for this visit:  Hematochezia  -     Referral to Gastroenterology; Future  -    "  CT abdomen pelvis w and wo IV contrast; Future  -     Stool Pathogen Panel, PCR; Future  -     Occult Blood, Stool; Future  -     Giardia antigen; Future  -     CBC and Auto Differential; Future  -     Comprehensive metabolic panel; Future  Diarrhea, unspecified type  -     Referral to Gastroenterology; Future  -     CT abdomen pelvis w and wo IV contrast; Future  -     Stool Pathogen Panel, PCR; Future  -     Occult Blood, Stool; Future  -     Giardia antigen; Future  Bloating  Hematochzia    Referral placed to GI for further management- Urgent referral  CT ABD ordered STAT. This office will call you with the results once received today.   Order placed for stool testing as well. Please ask the lab for the kit to obtain stool samples.   Diarrhea/Bloating/  Stop metamucil, stop vitamin d   Begin BRAT diet-bland foods  Banana, Applesauce, rice, toast plain in small amounts- multiple small meals throughout the day  Drink small sips of fluid throughout the day-   Please report to the ER if you should have fever, chills, shortness of breath,chest pain, chest pressure. Extreme fatigue    This note was dictated using DRAGON speech recognition software and was corrected for spelling or grammatical errors, but despite proofreading several typographical errors might be present that might affect the meaning of the content.  Gladys Fierro CNP    Time Spent  Prep time on day of patient encounter: 5 minutes  Time spent directly with patient, family or caregiver: 20 minutes  Documentation Time: 5 minutes

## 2025-01-22 NOTE — PATIENT INSTRUCTIONS
Hematochzia  Referral placed to GI for further management- Urgent referral  CT ABD ordered STAT. This office will call you with the results once received today.   Order placed for stool testing as well. Please ask the lab for the kit to obtain stool samples.   Diarrhea/Bloating/  Begin BRAT diet-bland foods  Banana, Applesauce, rice, toast plain in small amounts- multiple small meals throughout the day  Drink small sips of fluid throughout the day-   Please report to the ER if you should have fever, chills, shortness of breath,chest pain, chest pressure. Extreme fatigue

## 2025-01-25 ENCOUNTER — LAB (OUTPATIENT)
Dept: LAB | Facility: LAB | Age: 65
End: 2025-01-25
Payer: COMMERCIAL

## 2025-01-25 DIAGNOSIS — R19.7 DIARRHEA, UNSPECIFIED TYPE: ICD-10-CM

## 2025-01-25 DIAGNOSIS — K92.1 HEMATOCHEZIA: ICD-10-CM

## 2025-01-25 LAB — HEMOCCULT SP1 STL QL: NEGATIVE

## 2025-01-25 PROCEDURE — 87329 GIARDIA AG IA: CPT

## 2025-01-25 PROCEDURE — 82270 OCCULT BLOOD FECES: CPT

## 2025-01-25 PROCEDURE — 87506 IADNA-DNA/RNA PROBE TQ 6-11: CPT

## 2025-01-27 LAB

## 2025-01-28 LAB — G LAMBLIA AG STL QL IA: NEGATIVE

## 2025-02-09 DIAGNOSIS — J41.0 SIMPLE CHRONIC BRONCHITIS (MULTI): ICD-10-CM

## 2025-02-10 RX ORDER — FLUTICASONE PROPIONATE AND SALMETEROL 500; 50 UG/1; UG/1
1 POWDER RESPIRATORY (INHALATION) 2 TIMES DAILY
Qty: 180 EACH | Refills: 0 | Status: SHIPPED | OUTPATIENT
Start: 2025-02-10 | End: 2025-02-14 | Stop reason: SDUPTHER

## 2025-02-12 ASSESSMENT — ENCOUNTER SYMPTOMS
FATIGUE: 1
HUNGER: 0
HEADACHES: 1
SPEECH DIFFICULTY: 0
SEIZURES: 0
POLYDIPSIA: 0
NERVOUS/ANXIOUS: 0
BLACKOUTS: 0
BLURRED VISION: 0
VISUAL CHANGE: 0
WEIGHT LOSS: 0
SWEATS: 0
DIZZINESS: 0
WEAKNESS: 0
CONFUSION: 0
POLYPHAGIA: 0
TREMORS: 0

## 2025-02-14 ENCOUNTER — HOSPITAL ENCOUNTER (OUTPATIENT)
Dept: RADIOLOGY | Facility: HOSPITAL | Age: 65
Discharge: HOME | End: 2025-02-14
Payer: COMMERCIAL

## 2025-02-14 ENCOUNTER — OFFICE VISIT (OUTPATIENT)
Dept: PRIMARY CARE | Facility: CLINIC | Age: 65
End: 2025-02-14
Payer: COMMERCIAL

## 2025-02-14 VITALS
HEART RATE: 67 BPM | DIASTOLIC BLOOD PRESSURE: 73 MMHG | RESPIRATION RATE: 16 BRPM | WEIGHT: 177 LBS | HEIGHT: 63 IN | OXYGEN SATURATION: 97 % | BODY MASS INDEX: 31.36 KG/M2 | SYSTOLIC BLOOD PRESSURE: 115 MMHG

## 2025-02-14 DIAGNOSIS — R05.2 SUBACUTE COUGH: ICD-10-CM

## 2025-02-14 DIAGNOSIS — I10 ESSENTIAL (PRIMARY) HYPERTENSION: Primary | ICD-10-CM

## 2025-02-14 DIAGNOSIS — J41.0 SIMPLE CHRONIC BRONCHITIS (MULTI): ICD-10-CM

## 2025-02-14 DIAGNOSIS — E78.2 MIXED HYPERLIPIDEMIA: ICD-10-CM

## 2025-02-14 DIAGNOSIS — J06.9 VIRAL UPPER RESPIRATORY TRACT INFECTION: ICD-10-CM

## 2025-02-14 DIAGNOSIS — E11.9 TYPE 2 DIABETES MELLITUS WITHOUT COMPLICATION, WITHOUT LONG-TERM CURRENT USE OF INSULIN (MULTI): ICD-10-CM

## 2025-02-14 LAB — POC HEMOGLOBIN A1C: 5.9 % (ref 4.2–6.5)

## 2025-02-14 PROCEDURE — 83036 HEMOGLOBIN GLYCOSYLATED A1C: CPT

## 2025-02-14 PROCEDURE — 3074F SYST BP LT 130 MM HG: CPT

## 2025-02-14 PROCEDURE — 1036F TOBACCO NON-USER: CPT

## 2025-02-14 PROCEDURE — 3078F DIAST BP <80 MM HG: CPT

## 2025-02-14 PROCEDURE — 4010F ACE/ARB THERAPY RXD/TAKEN: CPT

## 2025-02-14 PROCEDURE — 71046 X-RAY EXAM CHEST 2 VIEWS: CPT

## 2025-02-14 PROCEDURE — 99214 OFFICE O/P EST MOD 30 MIN: CPT

## 2025-02-14 PROCEDURE — 3008F BODY MASS INDEX DOCD: CPT

## 2025-02-14 RX ORDER — LISINOPRIL 5 MG/1
5 TABLET ORAL DAILY
Qty: 90 TABLET | Refills: 1 | Status: SHIPPED | OUTPATIENT
Start: 2025-02-14

## 2025-02-14 RX ORDER — FLUTICASONE PROPIONATE AND SALMETEROL 500; 50 UG/1; UG/1
1 POWDER RESPIRATORY (INHALATION) 2 TIMES DAILY
Qty: 180 EACH | Refills: 1 | Status: SHIPPED | OUTPATIENT
Start: 2025-02-14

## 2025-02-14 RX ORDER — ROSUVASTATIN CALCIUM 5 MG/1
5 TABLET, COATED ORAL DAILY
Qty: 90 TABLET | Refills: 1 | Status: SHIPPED | OUTPATIENT
Start: 2025-02-14

## 2025-02-14 RX ORDER — BENZONATATE 100 MG/1
100 CAPSULE ORAL 3 TIMES DAILY PRN
Qty: 42 CAPSULE | Refills: 0 | Status: SHIPPED | OUTPATIENT
Start: 2025-02-14 | End: 2025-08-13

## 2025-02-14 RX ORDER — METFORMIN HYDROCHLORIDE 500 MG/1
500 TABLET, EXTENDED RELEASE ORAL 2 TIMES DAILY
Qty: 180 TABLET | Refills: 1 | Status: SHIPPED | OUTPATIENT
Start: 2025-02-14

## 2025-02-14 ASSESSMENT — ENCOUNTER SYMPTOMS
TREMORS: 0
HEADACHES: 1
DEPRESSION: 0
VISUAL CHANGE: 0
POLYDIPSIA: 0
NERVOUS/ANXIOUS: 0
CONFUSION: 0
POLYPHAGIA: 0
SPEECH DIFFICULTY: 0
WEIGHT LOSS: 0
LOSS OF SENSATION IN FEET: 0
WEAKNESS: 0
BLACKOUTS: 0
SEIZURES: 0
BLURRED VISION: 0
DIZZINESS: 0
FATIGUE: 1
SWEATS: 0
OCCASIONAL FEELINGS OF UNSTEADINESS: 0
HUNGER: 0

## 2025-02-14 ASSESSMENT — PAIN SCALES - GENERAL: PAINLEVEL_OUTOF10: 0-NO PAIN

## 2025-02-14 NOTE — PROGRESS NOTES
Subjective   Patient ID: Clara Rashid is a 64 y.o. female who presents for 3 month follow up.    Diabetes  She has type 2 diabetes mellitus. No MedicAlert identification noted. Hypoglycemia symptoms include headaches. Pertinent negatives for hypoglycemia include no confusion, dizziness, hunger, mood changes, nervousness/anxiousness, pallor, seizures, sleepiness, speech difficulty, sweats or tremors. Associated symptoms include fatigue. Pertinent negatives for diabetes include no blurred vision, no chest pain, no foot paresthesias, no foot ulcerations, no polydipsia, no polyphagia, no polyuria, no visual change, no weakness and no weight loss. Pertinent negatives for hypoglycemia complications include no blackouts, no hospitalization, no nocturnal hypoglycemia and no required glucagon injection. Symptoms are stable. Pertinent negatives for diabetic complications include no CVA, heart disease, impotence, nephropathy, peripheral neuropathy, PVD or retinopathy. Risk factors for coronary artery disease include dyslipidemia, hypertension, obesity and stress. Current diabetic treatment includes diet and insulin injections. She is compliant with treatment all of the time. She is currently taking insulin pre-breakfast. Insulin injections are given by patient. Rotation sites for injection include the abdominal wall. Her weight is stable. She is following a generally healthy diet. When asked about meal planning, she reported none. She has not had a previous visit with a dietitian. She participates in exercise every other day. She monitors blood glucose at home 1-2 x per week. Blood glucose monitoring compliance is fair. There is no change in her home blood glucose trend. Her breakfast blood glucose is taken between 6-7 am. Her breakfast blood glucose range is generally  mg/dl. Her lunch blood glucose is taken between 1-2 pm. Her dinner blood glucose is taken between 6-7 pm. Her bedtime blood glucose is taken after 11  pm. She does not see a podiatrist.Eye exam is current.   DM  Blood sugars as stated above  Compliant on Metformin 500mg twice daily  Trulicity 3mg once weekly  Injection day is Thursdays    Decreased appetite since colonoscopy   Waiting on BX results     HTN  Compliant on lisinopril 5mg daily  She is not  monitoring blood pressure at home     URI SX  X 2 weeks   Covid negative on Tuesday at home.  Cough, chest congestion, sore throat  Denies Intermittent fever, chills    dx pneumonia  Son also  URI SX  Has taken tylenol, no other meds   Review of Systems   Constitutional:  Positive for fatigue. Negative for weight loss.   Eyes:  Negative for blurred vision.   Cardiovascular:  Negative for chest pain.   Endocrine: Negative for polydipsia, polyphagia and polyuria.   Genitourinary:  Negative for impotence.   Skin:  Negative for pallor.   Neurological:  Positive for headaches. Negative for dizziness, tremors, seizures, speech difficulty and weakness.   Psychiatric/Behavioral:  Negative for confusion. The patient is not nervous/anxious.      Current Outpatient Medications:     acetaminophen (Tylenol) 500 mg tablet, Take 2 tablets (1,000 mg) by mouth every 6 hours if needed for mild pain (1 - 3)., Disp: , Rfl:     albuterol (Proventil HFA) 90 mcg/actuation inhaler, Inhale 2 puffs every 4 hours if needed for wheezing or shortness of breath., Disp: 6.7 g, Rfl: 5    dulaglutide (Trulicity) 3 mg/0.5 mL injection, Inject 3 mg under the skin 1 (one) time per week., Disp: 6 mL, Rfl: 2    psyllium husk (METAMUCIL ORAL), Take by mouth., Disp: , Rfl:     benzonatate (Tessalon) 100 mg capsule, Take 1 capsule (100 mg) by mouth 3 times a day as needed for cough. Do not crush or chew., Disp: 42 capsule, Rfl: 0    lisinopril 5 mg tablet, Take 1 tablet (5 mg) by mouth once daily., Disp: 90 tablet, Rfl: 1    metFORMIN  mg 24 hr tablet, Take 1 tablet (500 mg) by mouth 2 times a day., Disp: 180 tablet, Rfl: 1    rosuvastatin  "(Crestor) 5 mg tablet, Take 1 tablet (5 mg) by mouth once daily., Disp: 90 tablet, Rfl: 1    Wixela Inhub 500-50 mcg/dose diskus inhaler, Inhale 1 puff 2 times a day., Disp: 180 each, Rfl: 1      Objective   /73 (BP Location: Left arm, Patient Position: Sitting, BP Cuff Size: Adult)   Pulse 67   Resp 16   Ht 1.6 m (5' 3\")   Wt 80.3 kg (177 lb)   SpO2 97%   BMI 31.35 kg/m²     Physical Exam  Vitals reviewed.   Cardiovascular:      Rate and Rhythm: Normal rate.   Musculoskeletal:      Cervical back: Normal range of motion.   Neurological:      General: No focal deficit present.      Mental Status: She is alert.   Psychiatric:         Mood and Affect: Mood normal.         Assessment/Plan   Assessment & Plan  Essential (primary) hypertension  HYPERTENSION:   Decrease intake of processed foods, fast foods, lunch meat, canned soups, canned veggies.  Increase intake of fresh fruits, veggies, and lean meats. Monitor blood pressure at home, keep a log and bring this with you to your next appointment. Call the office if your blood pressure runs 150/90 or higher consistently.  Blood Pressure Technique:  Sit quietly in a chair for 5 minutes with back supported and feet on the floor  Then place left arm on a table or armrest so bicep area is at the same level of heart or left breast  Check three times in a row, disregard the highest reading and average the other two    Type 2 diabetes mellitus without complication, without long-term current use of insulin (Multi)    Orders:    POCT glycosylated hemoglobin (Hb A1C) manually resulted    metFORMIN  mg 24 hr tablet; Take 1 tablet (500 mg) by mouth 2 times a day.  Diabetes Type 2  A1C is now 5.9 from 6.7   Continue current medication.  Continue work on diet - recommend lots of fruits and vegetables, lean protein like chicken, turkey, fish, beans and Greek yogurt. Try to choose healthier carbohydrate options like oatmeal, wheat bread and pasta, sweet potatoes. Limit " sugary treats.  Check a fasting sugar first thing in the AM twice daily and keep a log of the results to bring to your next office visit.  Please contact office if your sugars are consistently >140.  Reevaluate in 3 months.     Simple chronic bronchitis (Multi)    Orders:    Wixela Inhub 500-50 mcg/dose diskus inhaler; Inhale 1 puff 2 times a day.    Mixed hyperlipidemia    HYPERLIPIDEMIA:  Your cholesterol level is elevated. Decrease intake of saturated fats, fast food, sweets.  Increase intake of fresh fruit fresh vegetables and lean meats.  Increase healthy fats seeds, nuts, olive oil instead of butter.  walk 150 minutes/week for heart health.   Aim for 25-30 grams of fiber in your diet daily.  May consider adding Fish Oil supplement 1,200 mg per day or Omega 3 Supplement daily.         Subacute cough    Orders:    XR chest 2 views; Future    benzonatate (Tessalon) 100 mg capsule; Take 1 capsule (100 mg) by mouth 3 times a day as needed for cough. Do not crush or chew.  Chest xray ordered. Please have this done. This office will call you with results once received.   Viral upper respiratory tract infection    Get plenty of rest and maintain hydration.    May use humidification in sleeping areas.  Use saline nasal spray to thin mucous.   May use throat lozenges, salt water gargles, or chloraseptic spray as needed to relieve sore throat.    OTC Cold Medications:  Tylenol/Ibuprofen- pain control and fever management  Guaifenesin - thins mucus - take with a full glass of water for best effect  Dextromethorphan - cough suppressant  Decongestants - dry up mucus - pseudoephedrine (stronger, but more side effects) and phenylephrine - be cautious if high blood pressure  Flonase- nasal steroid to improve nasal inflammation.    Use good hand hygiene to prevent the spread of germs.    Follow up if symptoms persist greater than 7 days.  Seek emergency medical care if fever above 104F, difficulty swallowing, or difficulty  breathing occurs.        This note was dictated using DRAGON speech recognition software and was corrected for spelling or grammatical errors, but despite proofreading several typographical errors might be present that might affect the meaning of the content.  Gladys Fierro CNP  Advanced care planning was discussed with Clara Rashid today. We reviewed code status, Medical Power of , and Living will. Pt has LW or POA.

## 2025-02-14 NOTE — PATIENT INSTRUCTIONS
ssessment & Plan  Essential (primary) hypertension  HYPERTENSION:   Decrease intake of processed foods, fast foods, lunch meat, canned soups, canned veggies.  Increase intake of fresh fruits, veggies, and lean meats. Monitor blood pressure at home, keep a log and bring this with you to your next appointment. Call the office if your blood pressure runs 150/90 or higher consistently.  Blood Pressure Technique:  Sit quietly in a chair for 5 minutes with back supported and feet on the floor  Then place left arm on a table or armrest so bicep area is at the same level of heart or left breast  Check three times in a row, disregard the highest reading and average the other two    Type 2 diabetes mellitus without complication, without long-term current use of insulin (Multi)    Orders:    POCT glycosylated hemoglobin (Hb A1C) manually resulted    metFORMIN  mg 24 hr tablet; Take 1 tablet (500 mg) by mouth 2 times a day.  Diabetes Type 2  A1C is now 5.9 from 6.7   Continue current medication.  Continue work on diet - recommend lots of fruits and vegetables, lean protein like chicken, turkey, fish, beans and Greek yogurt. Try to choose healthier carbohydrate options like oatmeal, wheat bread and pasta, sweet potatoes. Limit sugary treats.  Check a fasting sugar first thing in the AM twice daily and keep a log of the results to bring to your next office visit.  Please contact office if your sugars are consistently >140.  Reevaluate in 3 months.     Simple chronic bronchitis (Multi)    Orders:    Wixela Inhub 500-50 mcg/dose diskus inhaler; Inhale 1 puff 2 times a day.    Mixed hyperlipidemia    HYPERLIPIDEMIA:  Your cholesterol level is elevated. Decrease intake of saturated fats, fast food, sweets.  Increase intake of fresh fruit fresh vegetables and lean meats.  Increase healthy fats seeds, nuts, olive oil instead of butter.  walk 150 minutes/week for heart health.   Aim for 25-30 grams of fiber in your diet daily.   May consider adding Fish Oil supplement 1,200 mg per day or Omega 3 Supplement daily.         Subacute cough    Orders:    XR chest 2 views; Future    benzonatate (Tessalon) 100 mg capsule; Take 1 capsule (100 mg) by mouth 3 times a day as needed for cough. Do not crush or chew.  Chest xray ordered. Please have this done. This office will call you with results once received.   Viral upper respiratory tract infection    Get plenty of rest and maintain hydration.    May use humidification in sleeping areas.  Use saline nasal spray to thin mucous.   May use throat lozenges, salt water gargles, or chloraseptic spray as needed to relieve sore throat.    OTC Cold Medications:  Tylenol/Ibuprofen- pain control and fever management  Guaifenesin - thins mucus - take with a full glass of water for best effect  Dextromethorphan - cough suppressant  Decongestants - dry up mucus - pseudoephedrine (stronger, but more side effects) and phenylephrine - be cautious if high blood pressure  Flonase- nasal steroid to improve nasal inflammation.    Use good hand hygiene to prevent the spread of germs.    Follow up if symptoms persist greater than 7 days.  Seek emergency medical care if fever above 104F, difficulty swallowing, or difficulty breathing occurs.

## 2025-02-14 NOTE — ASSESSMENT & PLAN NOTE
HYPERLIPIDEMIA:  Your cholesterol level is elevated. Decrease intake of saturated fats, fast food, sweets.  Increase intake of fresh fruit fresh vegetables and lean meats.  Increase healthy fats seeds, nuts, olive oil instead of butter.  walk 150 minutes/week for heart health.   Aim for 25-30 grams of fiber in your diet daily.  May consider adding Fish Oil supplement 1,200 mg per day or Omega 3 Supplement daily.

## 2025-02-14 NOTE — ASSESSMENT & PLAN NOTE
Orders:    POCT glycosylated hemoglobin (Hb A1C) manually resulted    metFORMIN  mg 24 hr tablet; Take 1 tablet (500 mg) by mouth 2 times a day.  Diabetes Type 2  A1C is now 5.9 from 6.7   Continue current medication.  Continue work on diet - recommend lots of fruits and vegetables, lean protein like chicken, turkey, fish, beans and Greek yogurt. Try to choose healthier carbohydrate options like oatmeal, wheat bread and pasta, sweet potatoes. Limit sugary treats.  Check a fasting sugar first thing in the AM twice daily and keep a log of the results to bring to your next office visit.  Please contact office if your sugars are consistently >140.  Reevaluate in 3 months.

## 2025-03-10 PROCEDURE — RXMED WILLOW AMBULATORY MEDICATION CHARGE

## 2025-03-13 ENCOUNTER — PHARMACY VISIT (OUTPATIENT)
Dept: PHARMACY | Facility: CLINIC | Age: 65
End: 2025-03-13
Payer: MEDICARE

## 2025-03-19 NOTE — PROGRESS NOTES
HPI:  Proc (3/8/22): TURBT large, R URS, stent placement, retrograde pyelogram  Path: Papillary urothelial carcinoma, low grade with focal high grade cytologic features, focal area suspicious for lamina propria invasion (TURBT), non-invasive papillary urothelial carcinoma, low grade (left bladder wall biopsy), partially-denuded urothelial mucosa with inflammation and focal atypia that does not meet the threshold for carcinoma in situ (deep bladder biopsy)     Proc (4/5/22): TURBT medium, stent exchange  Path: Partially denuded urothelial mucosa with chronic inflammation and proliferative changes, favor reactive, changes consistent with previous procedure, muscularis propria negative for carcinoma (TURBT), smooth muscle and adipose tissue negative for carcinoma (deep margin tumor biopsy)     Proc (9/6/22): TURBT small  Path: focal high-grade and predominantly low-grade papillary urothelial carcinoma, no definitive lamina propria invasion, muscularis propria uninvolved by carcinoma    Proc (8/20/24): TURBT small with bluelight cystoscopy  Path: non-invasive papillary urothelial carcinoma, low-grade     BCG induction 2x     Clara Rashid is a 64 y.o. female referred by Dr. Mcdonald with gross hematuria. Patient had a cystoscopy with Dr. Mcdonald (2/23/22) which showed a large tumor on the R lateral wall, and a small tumor on the L trigone. CT Urogram (2/28/22) with two synchronous urinary bladder masses, larger mass involves/surrounds right UVJ without obstructing it, smaller mass near/involving portion of left UVJ without obstructing it, a few nonspecific <6 mm solid indeterminate bibasilar lung nodules. S/p TURBT large (3/8/22) with pathology showing papillary urothelial carcinoma, low grade with focal high grade cytologic features, focal area suspicious for lamina propria invasion. S/p TURBT medium (4/5/22) with pathology showing partially denuded urothelial mucosa with chronic inflammation and proliferative changes  consistent with previous procedure, muscularis propria, smooth muscle and adipose tissue negative for carcinoma. Completed BCG. S/p TURBT small (9/6/22) with pathology showing focal high-grade and predominantly low-grade papillary urothelial carcinoma, no definitive lamina propria invasion, muscularis propria uninvolved by carcinoma. CT Urogram (9/29/23) showed no enhancing mass or filling defect in the bilateral ureters or bladder to suggest residual/recurrent disease and no evidence of distant disease. CT chest (9/29/23) showed few indeterminate less than 6 mm pulmonary nodules, otherwise, no suspicious pulmonary nodule or thoracic adenopathy. S/p TURBT small with bluelight cystoscopy (8/20/24) with pathology showing non-invasive papillary urothelial carcinoma, low-grade. Patient s/p lung resection (10/28/24) with pathology showing invasive non-small cell carcinoma, all resected and does not need RT or chemotherapy. Presents for cystoscopy. Good urinary function. Denies hematuria.      Smoker x 43 years. Has quit smoking tobacco in February 22'. Continues to smoke marijuana   SHx: Mohs, rotator cuff, 2 C-sections.   FHx: Grandmother with bladder cancer (age 90). Did not die of disease.     Cre: 0.70 (8/12/24), 0.83 (2/23/22)     CT Urogram (2/28/22): Two synchronous urinary bladder masses. The larger mass involves/surrounds the right UVJ without obstructing it. The smaller mass is near/involving a portion of the left UVJ without obstructing it. A few nonspecific <6 mm solid indeterminate bibasilar lung nodules.     CT Urogram (9/29/23): no enhancing mass or filling defect in the bilateral ureters or bladder to suggest residual/recurrent disease and no evidence of distant disease.    CT chest (9/29/23): few indeterminate less than 6 mm pulmonary nodules, otherwise, no suspicious pulmonary nodule or thoracic adenopathy.    Review of Systems:  All systems reviewed. Anything negative noted in the HPI.    Physical  Exam:  Vitals signs reviewed.  Constitutional:      Appearance: Well-developed.  HENT:     Head: Normocephalic and atraumatic.  Neck:     Musculoskeletal: Normal range of motion.  Pulmonary:     Effort: Pulmonary effort is normal.  Musculoskeletal: Normal range of motion.  Skin:     General: Skin is warm and dry.  Neurological:     Mental Status: Alert and oriented to person, place, and time.  Psychiatric:        Behavior: Behavior normal.        Thought Content: Thought content normal.        Judgment: Judgment normal.     Cystoscopy    Date/Time: 3/20/2025 10:12 AM    Performed by: Fazal Mendoza MD  Authorized by: Fazal Mendoza MD    Procedure - Bladder Cystoscopy:     Procedure details: fulguration    Post-procedure:     Patient tolerance: Patient tolerated the procedure well with no immediate complications      Comments:      Tiny papillary lesion, along posterior wall of bladder, fulgurated.       Assessment/Plan   Clara Rashid is a 64 y.o. female referred by Dr. Mcdonald with gross hematuria. Patient had a cystoscopy with Dr. Mcdonald (2/23/22) which showed a large tumor on the R lateral wall, and a small tumor on the L trigone. CT Urogram (2/28/22) with two synchronous urinary bladder masses, larger mass involves/surrounds right UVJ without obstructing it, smaller mass near/involving portion of left UVJ without obstructing it, a few nonspecific <6 mm solid indeterminate bibasilar lung nodules. S/p TURBT large (3/8/22) with pathology showing papillary urothelial carcinoma, low grade with focal high grade cytologic features, focal area suspicious for lamina propria invasion. S/p TURBT medium (4/5/22) with pathology showing partially denuded urothelial mucosa with chronic inflammation and proliferative changes consistent with previous procedure, muscularis propria, smooth muscle and adipose tissue negative for carcinoma. Completed BCG. S/p TURBT small (9/6/22) with pathology showing focal high-grade and  predominantly low-grade papillary urothelial carcinoma, no definitive lamina propria invasion, muscularis propria uninvolved by carcinoma. CT Urogram (9/29/23) showed no enhancing mass or filling defect in the bilateral ureters or bladder to suggest residual/recurrent disease and no evidence of distant disease. CT chest (9/29/23) showed few indeterminate less than 6 mm pulmonary nodules, otherwise, no suspicious pulmonary nodule or thoracic adenopathy. S/p TURBT small with bluelight cystoscopy (8/20/24) with pathology showing non-invasive papillary urothelial carcinoma, low-grade. Patient s/p lung resection (10/28/24) with pathology showing invasive non-small cell carcinoma, all resected and does not need RT or chemotherapy. Good urinary function. Denies hematuria. Management options including risks, benefits and alternatives discussed at length and all questions answered. Patient prefers to proceed with cystoscopy with possible fulguration in 4mos. Patient will reach out with decision concerning intravesical chemotherapy .    Rx Lissette           Scribe Attestation  By signing my name below, I, Chandler Díaz   attest that this documentation has been prepared under the direction and in the presence of Fazal Mendoza MD.

## 2025-03-20 ENCOUNTER — APPOINTMENT (OUTPATIENT)
Dept: UROLOGY | Facility: CLINIC | Age: 65
End: 2025-03-20
Payer: COMMERCIAL

## 2025-03-20 VITALS
BODY MASS INDEX: 30.79 KG/M2 | TEMPERATURE: 97.4 F | HEART RATE: 80 BPM | DIASTOLIC BLOOD PRESSURE: 77 MMHG | HEIGHT: 63 IN | SYSTOLIC BLOOD PRESSURE: 159 MMHG | WEIGHT: 173.8 LBS

## 2025-03-20 DIAGNOSIS — Z79.2 NEED FOR PROPHYLACTIC ANTIBIOTIC: Primary | ICD-10-CM

## 2025-03-20 DIAGNOSIS — C67.9 MALIGNANT NEOPLASM OF URINARY BLADDER, UNSPECIFIED SITE (MULTI): ICD-10-CM

## 2025-03-20 LAB
POC APPEARANCE, URINE: CLEAR
POC BILIRUBIN, URINE: NEGATIVE
POC BLOOD, URINE: ABNORMAL
POC COLOR, URINE: YELLOW
POC GLUCOSE, URINE: NEGATIVE MG/DL
POC KETONES, URINE: NEGATIVE MG/DL
POC LEUKOCYTES, URINE: NEGATIVE
POC NITRITE,URINE: NEGATIVE
POC PH, URINE: 6 PH
POC PROTEIN, URINE: NEGATIVE MG/DL
POC SPECIFIC GRAVITY, URINE: <=1.005
POC UROBILINOGEN, URINE: 0.2 EU/DL

## 2025-03-20 PROCEDURE — 81003 URINALYSIS AUTO W/O SCOPE: CPT | Performed by: STUDENT IN AN ORGANIZED HEALTH CARE EDUCATION/TRAINING PROGRAM

## 2025-03-20 PROCEDURE — 88112 CYTOPATH CELL ENHANCE TECH: CPT | Performed by: PATHOLOGY

## 2025-03-20 PROCEDURE — 88112 CYTOPATH CELL ENHANCE TECH: CPT

## 2025-03-20 PROCEDURE — 52214 CYSTOSCOPY AND TREATMENT: CPT | Performed by: STUDENT IN AN ORGANIZED HEALTH CARE EDUCATION/TRAINING PROGRAM

## 2025-03-20 RX ORDER — CEPHALEXIN 500 MG/1
500 CAPSULE ORAL 2 TIMES DAILY
Qty: 2 CAPSULE | Refills: 0 | Status: SHIPPED | OUTPATIENT
Start: 2025-03-20 | End: 2025-03-21

## 2025-03-25 LAB
LABORATORY COMMENT REPORT: NORMAL
LABORATORY COMMENT REPORT: NORMAL
PATH REPORT.FINAL DX SPEC: NORMAL
PATH REPORT.GROSS SPEC: NORMAL
PATH REPORT.RELEVANT HX SPEC: NORMAL
PATH REPORT.TOTAL CANCER: NORMAL
RESIDENT REVIEW: NORMAL

## 2025-04-02 ENCOUNTER — HOSPITAL ENCOUNTER (OUTPATIENT)
Dept: RADIOLOGY | Facility: HOSPITAL | Age: 65
Discharge: HOME | End: 2025-04-02
Payer: COMMERCIAL

## 2025-04-02 DIAGNOSIS — Z98.890 HISTORY OF LUNG SURGERY: ICD-10-CM

## 2025-04-02 PROCEDURE — 71250 CT THORAX DX C-: CPT

## 2025-04-07 ENCOUNTER — APPOINTMENT (OUTPATIENT)
Dept: CARDIOTHORACIC SURGERY | Facility: HOSPITAL | Age: 65
End: 2025-04-07
Payer: COMMERCIAL

## 2025-04-07 VITALS
WEIGHT: 176.8 LBS | HEIGHT: 63 IN | HEART RATE: 83 BPM | SYSTOLIC BLOOD PRESSURE: 150 MMHG | OXYGEN SATURATION: 99 % | RESPIRATION RATE: 18 BRPM | BODY MASS INDEX: 31.33 KG/M2 | DIASTOLIC BLOOD PRESSURE: 84 MMHG

## 2025-04-07 DIAGNOSIS — C34.32 MALIGNANT NEOPLASM OF LOWER LOBE OF LEFT LUNG (MULTI): Primary | ICD-10-CM

## 2025-04-07 DIAGNOSIS — Z98.890 HISTORY OF LUNG SURGERY: ICD-10-CM

## 2025-04-07 PROCEDURE — 3077F SYST BP >= 140 MM HG: CPT | Performed by: THORACIC SURGERY (CARDIOTHORACIC VASCULAR SURGERY)

## 2025-04-07 PROCEDURE — 99215 OFFICE O/P EST HI 40 MIN: CPT | Performed by: THORACIC SURGERY (CARDIOTHORACIC VASCULAR SURGERY)

## 2025-04-07 PROCEDURE — 3008F BODY MASS INDEX DOCD: CPT | Performed by: THORACIC SURGERY (CARDIOTHORACIC VASCULAR SURGERY)

## 2025-04-07 PROCEDURE — 1036F TOBACCO NON-USER: CPT | Performed by: THORACIC SURGERY (CARDIOTHORACIC VASCULAR SURGERY)

## 2025-04-07 PROCEDURE — 3079F DIAST BP 80-89 MM HG: CPT | Performed by: THORACIC SURGERY (CARDIOTHORACIC VASCULAR SURGERY)

## 2025-04-07 PROCEDURE — 4010F ACE/ARB THERAPY RXD/TAKEN: CPT | Performed by: THORACIC SURGERY (CARDIOTHORACIC VASCULAR SURGERY)

## 2025-04-07 ASSESSMENT — ENCOUNTER SYMPTOMS
ENDOCRINE NEGATIVE: 1
NAUSEA: 0
NEUROLOGICAL NEGATIVE: 1
ABDOMINAL PAIN: 0
CHILLS: 0
CHOKING: 0
WHEEZING: 0
PALPITATIONS: 0
HEMATOLOGIC/LYMPHATIC NEGATIVE: 1
FEVER: 0
OCCASIONAL FEELINGS OF UNSTEADINESS: 0
VOMITING: 0
FATIGUE: 0
CONSTIPATION: 0
ALLERGIC/IMMUNOLOGIC NEGATIVE: 1
LOSS OF SENSATION IN FEET: 0
APPETITE CHANGE: 0
ABDOMINAL DISTENTION: 0
UNEXPECTED WEIGHT CHANGE: 0
DIAPHORESIS: 0
PSYCHIATRIC NEGATIVE: 1
CHEST TIGHTNESS: 0
STRIDOR: 0
DIARRHEA: 0
DEPRESSION: 0
MUSCULOSKELETAL NEGATIVE: 1
EYES NEGATIVE: 1
COUGH: 0
SHORTNESS OF BREATH: 0

## 2025-04-07 ASSESSMENT — LIFESTYLE VARIABLES
AUDIT-C TOTAL SCORE: 1
HOW OFTEN DO YOU HAVE A DRINK CONTAINING ALCOHOL: MONTHLY OR LESS
SKIP TO QUESTIONS 9-10: 1
HOW MANY STANDARD DRINKS CONTAINING ALCOHOL DO YOU HAVE ON A TYPICAL DAY: 1 OR 2
HOW OFTEN DO YOU HAVE SIX OR MORE DRINKS ON ONE OCCASION: NEVER

## 2025-04-07 ASSESSMENT — PAIN SCALES - GENERAL: PAINLEVEL_OUTOF10: 0-NO PAIN

## 2025-04-07 NOTE — PROGRESS NOTES
Subjective   Patient ID: Clara Rashid is a 64 y.o. female who presents for Follow-up (6 mo ct).  HPI    63-year-old female with a remote history of smoking who has a history of bladder cancer that has been removed couple of times.  Seems like the disease under control.  She underwent a CT of the chest revealing a 1.1 cm left lower lobe lung nodule.  Compared to MRI scan this seems bigger.  She underwent a PET scan showing an SUV max of 4.1.  There is also activity at the base of the tongue and in the neck.  This seems to be inflammatory however we will refer her to ENT for evaluation.  I discussion with her about best next steps.  I think this is peripheral enough that we can do a diagnostic wedge resection and potentially it could be therapeutic as well.  Will obtain PFTs to assess her candidacy.  However, she is a very active woman with no shortness of breath so I will be surprised if the PFTs are not good for surgery.  Will plan to do these at [facility] on October 28.  We discussed the mechanics of a robotic lung resection including expected recovery and potential complications.    Update 2 4/7/2025  To recover very well from her lung resection.  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  This is her 6-month visit after her postoperative visit with us.  She has been doing well with no major issues.  Some discomfort in the left side of her abdomen which is expected after this operation.  CT scan does not show any evidence of recurrence.  She is about to start some treatment for bladder cancer.  No concerns from the lung standpoint.  I will see her again in 6 months with a CT of the chest.  Will also refer her to a pulmonologist.    Review of Systems   Constitutional:  Negative for appetite change, chills, diaphoresis, fatigue, fever and unexpected weight change.   HENT: Negative.     Eyes: Negative.    Respiratory:  Negative for cough, choking, chest tightness, shortness of breath, wheezing and  stridor.    Cardiovascular:  Negative for chest pain, palpitations and leg swelling.   Gastrointestinal:  Negative for abdominal distention, abdominal pain, constipation, diarrhea, nausea and vomiting.   Endocrine: Negative.    Genitourinary: Negative.    Musculoskeletal: Negative.    Skin: Negative.    Allergic/Immunologic: Negative.    Neurological: Negative.    Hematological: Negative.    Psychiatric/Behavioral: Negative.     All other systems reviewed and are negative.      Objective   Physical Exam  Constitutional:       Appearance: Normal appearance.   HENT:      Head: Normocephalic and atraumatic.      Nose: Nose normal.      Mouth/Throat:      Mouth: Mucous membranes are moist.      Pharynx: Oropharynx is clear.   Eyes:      Extraocular Movements: Extraocular movements intact.      Conjunctiva/sclera: Conjunctivae normal.      Pupils: Pupils are equal, round, and reactive to light.   Cardiovascular:      Rate and Rhythm: Normal rate and regular rhythm.      Pulses: Normal pulses.      Heart sounds: Normal heart sounds.   Pulmonary:      Effort: Pulmonary effort is normal. No respiratory distress.      Breath sounds: Normal breath sounds. No stridor. No wheezing, rhonchi or rales.   Chest:      Chest wall: No tenderness.   Abdominal:      General: Abdomen is flat. Bowel sounds are normal.      Palpations: Abdomen is soft.   Musculoskeletal:         General: Normal range of motion.      Cervical back: Normal range of motion and neck supple.   Skin:     General: Skin is warm and dry.      Capillary Refill: Capillary refill takes less than 2 seconds.   Neurological:      General: No focal deficit present.      Mental Status: She is alert and oriented to person, place, and time.         Assessment/Plan   Diagnoses and all orders for this visit:    Neoplasm of uncertain behavior of lung  -     CT chest in 6 months  - Referral to pulmonologist          Phillip Garrido MD 04/07/25 10:14 AM

## 2025-04-10 ENCOUNTER — APPOINTMENT (OUTPATIENT)
Facility: CLINIC | Age: 65
End: 2025-04-10
Payer: COMMERCIAL

## 2025-05-20 ENCOUNTER — OFFICE VISIT (OUTPATIENT)
Dept: PRIMARY CARE | Facility: CLINIC | Age: 65
End: 2025-05-20
Payer: COMMERCIAL

## 2025-05-20 VITALS
BODY MASS INDEX: 32.64 KG/M2 | OXYGEN SATURATION: 99 % | SYSTOLIC BLOOD PRESSURE: 114 MMHG | WEIGHT: 184.2 LBS | DIASTOLIC BLOOD PRESSURE: 74 MMHG | RESPIRATION RATE: 16 BRPM | HEART RATE: 74 BPM | HEIGHT: 63 IN

## 2025-05-20 DIAGNOSIS — J41.0 SIMPLE CHRONIC BRONCHITIS (MULTI): ICD-10-CM

## 2025-05-20 DIAGNOSIS — C80.1 MALIGNANT NEOPLASM (MULTI): ICD-10-CM

## 2025-05-20 DIAGNOSIS — E11.9 TYPE 2 DIABETES MELLITUS WITHOUT COMPLICATION, WITHOUT LONG-TERM CURRENT USE OF INSULIN: ICD-10-CM

## 2025-05-20 DIAGNOSIS — I10 ESSENTIAL (PRIMARY) HYPERTENSION: ICD-10-CM

## 2025-05-20 DIAGNOSIS — E78.2 MIXED HYPERLIPIDEMIA: Primary | ICD-10-CM

## 2025-05-20 LAB — POC HEMOGLOBIN A1C: 6.1 % (ref 4.2–6.5)

## 2025-05-20 PROCEDURE — 3008F BODY MASS INDEX DOCD: CPT

## 2025-05-20 PROCEDURE — 1036F TOBACCO NON-USER: CPT

## 2025-05-20 PROCEDURE — 3078F DIAST BP <80 MM HG: CPT

## 2025-05-20 PROCEDURE — 3074F SYST BP LT 130 MM HG: CPT

## 2025-05-20 PROCEDURE — 3044F HG A1C LEVEL LT 7.0%: CPT

## 2025-05-20 PROCEDURE — 99214 OFFICE O/P EST MOD 30 MIN: CPT

## 2025-05-20 PROCEDURE — 83036 HEMOGLOBIN GLYCOSYLATED A1C: CPT

## 2025-05-20 PROCEDURE — 4010F ACE/ARB THERAPY RXD/TAKEN: CPT

## 2025-05-20 RX ORDER — ALBUTEROL SULFATE 90 UG/1
2 INHALANT RESPIRATORY (INHALATION) EVERY 4 HOURS PRN
Qty: 6.7 G | Refills: 5 | Status: SHIPPED | OUTPATIENT
Start: 2025-05-20 | End: 2026-05-20

## 2025-05-20 ASSESSMENT — PAIN SCALES - GENERAL: PAINLEVEL_OUTOF10: 0-NO PAIN

## 2025-05-20 ASSESSMENT — ENCOUNTER SYMPTOMS
LOSS OF SENSATION IN FEET: 0
DEPRESSION: 0
OCCASIONAL FEELINGS OF UNSTEADINESS: 0

## 2025-05-20 NOTE — PATIENT INSTRUCTIONS
Assessment/Plan   Assessment & Plan  Type 2 diabetes mellitus without complication, without long-term current use of insulin  Diabetes Type 2  A1C is now 6.1 from 5.9   Continue current medication.  Continue work on diet - recommend lots of fruits and vegetables, lean protein like chicken, turkey, fish, beans and Greek yogurt. Try to choose healthier carbohydrate options like oatmeal, wheat bread and pasta, sweet potatoes. Limit sugary treats.  Check a fasting sugar first thing in the AM once daily and keep a log of the results to bring to your next office visit.  Please contact office if your sugars are consistently >140.  Reevaluate in 3 months.     Orders:    POCT glycosylated hemoglobin (Hb A1C) manually resulted    Mixed hyperlipidemia    Decrease intake of saturated fats, fast food, sweets.  Increase intake of fresh fruit fresh vegetables and lean meats.  Increase healthy fats seeds, nuts, olive oil instead of butter.  walk 150 minutes/week for heart health.   Aim for 25-30 grams of fiber in your diet daily.  May consider adding Fish Oil supplement 1,200 mg per day or Omega 3 Supplement daily.      Essential (primary) hypertension  HYPERTENSION:   Decrease intake of processed foods, fast foods, lunch meat, canned soups, canned veggies.  Increase intake of fresh fruits, veggies, and lean meats. Monitor blood pressure at home, keep a log and bring this with you to your next appointment. Call the office if your blood pressure runs 150/90 or higher consistently.  Blood Pressure Technique:  Sit quietly in a chair for 5 minutes with back supported and feet on the floor  Then place left arm on a table or armrest so bicep area is at the same level of heart or left breast  Check three times in a row, disregard the highest reading and average the other two    Malignant neoplasm (Multi)  Under the consistent care of Dr. Karri Garrido, Dr. Mendoza, for management.\  Simple chronic bronchitis (Multi)    Orders:     albuterol (Proventil HFA) 90 mcg/actuation inhaler; Inhale 2 puffs every 4 hours if needed for wheezing or shortness of breath.

## 2025-05-20 NOTE — ASSESSMENT & PLAN NOTE
Orders:    albuterol (Proventil HFA) 90 mcg/actuation inhaler; Inhale 2 puffs every 4 hours if needed for wheezing or shortness of breath.

## 2025-05-20 NOTE — ASSESSMENT & PLAN NOTE
Diabetes Type 2  A1C is now 6.1 from 5.9   Continue current medication.  Continue work on diet - recommend lots of fruits and vegetables, lean protein like chicken, turkey, fish, beans and Greek yogurt. Try to choose healthier carbohydrate options like oatmeal, wheat bread and pasta, sweet potatoes. Limit sugary treats.  Check a fasting sugar first thing in the AM once daily and keep a log of the results to bring to your next office visit.  Please contact office if your sugars are consistently >140.  Reevaluate in 3 months.     Orders:    POCT glycosylated hemoglobin (Hb A1C) manually resulted

## 2025-05-20 NOTE — PROGRESS NOTES
"Subjective   Patient ID: Clara Rashid is a 64 y.o. female who presents for 3 Month follow up.    HPI   DM  Compliant on Trulicity 3 mg once weekly  Injection day as Monday  Metformin 500 mg twice daily  Sugars  fasting   If hypoglycemic event \"eats a cookie or whatever is right there\"   Does not happen often     HTN  Compliant on lisinopril 5 mg once daily  She is not monitoring blood pressures at home  Patient denies any falls, urgent care, ER, hospitalization, new diagnoses, surgeries since they were here last.     Denies any issues with chest pain, chest pressure, constipation,urinary urgency, frequency, blood in urine, muscle weakness in arms and legs, numbness or tingling in fingers or toes.  HX lymphatic colitis-   Seeing [facility] Currently on 90 days of budesonide mentions blood in stool intermittently.   SOB intermittent weather related can be with exertion or sitting. Increased with humidity.   Review of Systems  Review of Systems negative except as noted in HPI and Chief complaint.  Current Medications[1]    Objective   /74 (BP Location: Left arm, Patient Position: Sitting, BP Cuff Size: Adult)   Pulse 74   Resp 16   Ht 1.6 m (5' 3\")   Wt 83.6 kg (184 lb 3.2 oz)   SpO2 99%   BMI 32.63 kg/m²     Physical Exam  Vitals reviewed.   Cardiovascular:      Rate and Rhythm: Normal rate.   Musculoskeletal:      Cervical back: Normal range of motion.   Neurological:      General: No focal deficit present.      Mental Status: She is alert.   Psychiatric:         Mood and Affect: Mood normal.         Assessment/Plan   Assessment & Plan  Type 2 diabetes mellitus without complication, without long-term current use of insulin  Diabetes Type 2  A1C is now 6.1 from 5.9   Continue current medication.  Continue work on diet - recommend lots of fruits and vegetables, lean protein like chicken, turkey, fish, beans and Greek yogurt. Try to choose healthier carbohydrate options like oatmeal, wheat " bread and pasta, sweet potatoes. Limit sugary treats.  Check a fasting sugar first thing in the AM once daily and keep a log of the results to bring to your next office visit.  Please contact office if your sugars are consistently >140.  Reevaluate in 3 months.     Orders:    POCT glycosylated hemoglobin (Hb A1C) manually resulted    Mixed hyperlipidemia    Decrease intake of saturated fats, fast food, sweets.  Increase intake of fresh fruit fresh vegetables and lean meats.  Increase healthy fats seeds, nuts, olive oil instead of butter.  walk 150 minutes/week for heart health.   Aim for 25-30 grams of fiber in your diet daily.  May consider adding Fish Oil supplement 1,200 mg per day or Omega 3 Supplement daily.      Essential (primary) hypertension  HYPERTENSION:   Decrease intake of processed foods, fast foods, lunch meat, canned soups, canned veggies.  Increase intake of fresh fruits, veggies, and lean meats. Monitor blood pressure at home, keep a log and bring this with you to your next appointment. Call the office if your blood pressure runs 150/90 or higher consistently.  Blood Pressure Technique:  Sit quietly in a chair for 5 minutes with back supported and feet on the floor  Then place left arm on a table or armrest so bicep area is at the same level of heart or left breast  Check three times in a row, disregard the highest reading and average the other two    Malignant neoplasm (Multi)  Under the consistent care of Dr. Karri Garrido, Dr. Mendoza, for management.\  Simple chronic bronchitis (Multi)    Orders:    albuterol (Proventil HFA) 90 mcg/actuation inhaler; Inhale 2 puffs every 4 hours if needed for wheezing or shortness of breath.        This note was dictated using DRAGON speech recognition software and was corrected for spelling or grammatical errors, but despite proofreading several typographical errors might be present that might affect the meaning of the content.  Gladys Fierro, TAMMY       [1]    Current Outpatient Medications:     acetaminophen (Tylenol) 500 mg tablet, Take 2 tablets (1,000 mg) by mouth every 6 hours if needed for mild pain (1 - 3)., Disp: , Rfl:     BUDESONIDE ORAL, , Disp: , Rfl:     dulaglutide (Trulicity) 3 mg/0.5 mL injection, Inject 3 mg under the skin 1 (one) time per week., Disp: 6 mL, Rfl: 2    lisinopril 5 mg tablet, Take 1 tablet (5 mg) by mouth once daily., Disp: 90 tablet, Rfl: 1    metFORMIN  mg 24 hr tablet, Take 1 tablet (500 mg) by mouth 2 times a day., Disp: 180 tablet, Rfl: 1    rosuvastatin (Crestor) 5 mg tablet, Take 1 tablet (5 mg) by mouth once daily., Disp: 90 tablet, Rfl: 1    Wixela Inhub 500-50 mcg/dose diskus inhaler, Inhale 1 puff 2 times a day., Disp: 180 each, Rfl: 1    albuterol (Proventil HFA) 90 mcg/actuation inhaler, Inhale 2 puffs every 4 hours if needed for wheezing or shortness of breath., Disp: 6.7 g, Rfl: 5

## 2025-05-20 NOTE — ASSESSMENT & PLAN NOTE
Decrease intake of saturated fats, fast food, sweets.  Increase intake of fresh fruit fresh vegetables and lean meats.  Increase healthy fats seeds, nuts, olive oil instead of butter.  walk 150 minutes/week for heart health.   Aim for 25-30 grams of fiber in your diet daily.  May consider adding Fish Oil supplement 1,200 mg per day or Omega 3 Supplement daily.

## 2025-06-02 PROCEDURE — RXMED WILLOW AMBULATORY MEDICATION CHARGE

## 2025-06-07 ENCOUNTER — PHARMACY VISIT (OUTPATIENT)
Dept: PHARMACY | Facility: CLINIC | Age: 65
End: 2025-06-07
Payer: MEDICARE

## 2025-06-26 ENCOUNTER — APPOINTMENT (OUTPATIENT)
Facility: CLINIC | Age: 65
End: 2025-06-26
Payer: COMMERCIAL

## 2025-07-09 NOTE — PROGRESS NOTES
HPI:  Proc (3/8/22): TURBT large, R URS, stent placement, retrograde pyelogram  Path: Papillary urothelial carcinoma, low grade with focal high grade cytologic features, focal area suspicious for lamina propria invasion (TURBT), non-invasive papillary urothelial carcinoma, low grade (left bladder wall biopsy), partially-denuded urothelial mucosa with inflammation and focal atypia that does not meet the threshold for carcinoma in situ (deep bladder biopsy)     Proc (4/5/22): TURBT medium, stent exchange  Path: Partially denuded urothelial mucosa with chronic inflammation and proliferative changes, favor reactive, changes consistent with previous procedure, muscularis propria negative for carcinoma (TURBT), smooth muscle and adipose tissue negative for carcinoma (deep margin tumor biopsy)     Proc (9/6/22): TURBT small  Path: focal high-grade and predominantly low-grade papillary urothelial carcinoma, no definitive lamina propria invasion, muscularis propria uninvolved by carcinoma    Proc (8/20/24): TURBT small with bluelight cystoscopy  Path: non-invasive papillary urothelial carcinoma, low-grade     BCG induction 2x     Clara Rashid is a 64 y.o. female referred by Dr. Mcdonald with gross hematuria. Patient had a cystoscopy with Dr. Mcdonald (2/23/22) which showed a large tumor on the R lateral wall, and a small tumor on the L trigone. CT Urogram (2/28/22) with two synchronous urinary bladder masses, larger mass involves/surrounds right UVJ without obstructing it, smaller mass near/involving portion of left UVJ without obstructing it, a few nonspecific <6 mm solid indeterminate bibasilar lung nodules. S/p TURBT large (3/8/22) with pathology showing papillary urothelial carcinoma, low grade with focal high grade cytologic features, focal area suspicious for lamina propria invasion. S/p TURBT medium (4/5/22) with pathology showing partially denuded urothelial mucosa with chronic inflammation and proliferative changes  consistent with previous procedure, muscularis propria, smooth muscle and adipose tissue negative for carcinoma. Completed BCG. S/p TURBT small (9/6/22) with pathology showing focal high-grade and predominantly low-grade papillary urothelial carcinoma, no definitive lamina propria invasion, muscularis propria uninvolved by carcinoma. CT Urogram (9/29/23) showed no enhancing mass or filling defect in the bilateral ureters or bladder to suggest residual/recurrent disease and no evidence of distant disease. CT chest (9/29/23) showed few indeterminate less than 6 mm pulmonary nodules, otherwise, no suspicious pulmonary nodule or thoracic adenopathy. S/p TURBT small with bluelight cystoscopy (8/20/24) with pathology showing non-invasive papillary urothelial carcinoma, low-grade. Patient s/p lung resection (10/28/24) with pathology showing invasive non-small cell carcinoma, all resected and does not need RT or chemotherapy. Presents for cystoscopy. Doing well. Denies hematuria.      Smoker x 43 years. Has quit smoking tobacco in February 22'. Continues to smoke marijuana   SHx: Mohs, rotator cuff, 2 C-sections.   FHx: Grandmother with bladder cancer (age 90). Did not die of disease.     Cre: 0.70 (8/12/24), 0.83 (2/23/22)     CT Urogram (2/28/22): Two synchronous urinary bladder masses. The larger mass involves/surrounds the right UVJ without obstructing it. The smaller mass is near/involving a portion of the left UVJ without obstructing it. A few nonspecific <6 mm solid indeterminate bibasilar lung nodules.     CT Urogram (9/29/23): no enhancing mass or filling defect in the bilateral ureters or bladder to suggest residual/recurrent disease and no evidence of distant disease.    CT chest (9/29/23): few indeterminate less than 6 mm pulmonary nodules, otherwise, no suspicious pulmonary nodule or thoracic adenopathy.    Review of Systems:  All systems reviewed. Anything negative noted in the HPI.    Physical Exam:  Vitals  signs reviewed.  Constitutional:      Appearance: Well-developed.  HENT:     Head: Normocephalic and atraumatic.  Neck:     Musculoskeletal: Normal range of motion.  Pulmonary:     Effort: Pulmonary effort is normal.  Musculoskeletal: Normal range of motion.  Skin:     General: Skin is warm and dry.  Neurological:     Mental Status: Alert and oriented to person, place, and time.  Psychiatric:        Behavior: Behavior normal.        Thought Content: Thought content normal.        Judgment: Judgment normal.     Cystoscopy    Date/Time: 7/10/2025 9:15 AM    Performed by: Fazal Mendoza MD  Authorized by: Fazal Mendoza MD    Procedure - Bladder Cystoscopy:     Procedure details: fulguration    Post-procedure:     Patient tolerance: Patient tolerated the procedure well with no immediate complications      Comments:      Tiny lesion along posterior wall, fulgurated.       Assessment/Plan   Clara Rashid is a 64 y.o. female referred by Dr. Mcdonald with gross hematuria. Patient had a cystoscopy with Dr. Mcdonald (2/23/22) which showed a large tumor on the R lateral wall, and a small tumor on the L trigone. CT Urogram (2/28/22) with two synchronous urinary bladder masses, larger mass involves/surrounds right UVJ without obstructing it, smaller mass near/involving portion of left UVJ without obstructing it, a few nonspecific <6 mm solid indeterminate bibasilar lung nodules. S/p TURBT large (3/8/22) with pathology showing papillary urothelial carcinoma, low grade with focal high grade cytologic features, focal area suspicious for lamina propria invasion. S/p TURBT medium (4/5/22) with pathology showing partially denuded urothelial mucosa with chronic inflammation and proliferative changes consistent with previous procedure, muscularis propria, smooth muscle and adipose tissue negative for carcinoma. Completed BCG. S/p TURBT small (9/6/22) with pathology showing focal high-grade and predominantly low-grade papillary  urothelial carcinoma, no definitive lamina propria invasion, muscularis propria uninvolved by carcinoma. CT Urogram (9/29/23) showed no enhancing mass or filling defect in the bilateral ureters or bladder to suggest residual/recurrent disease and no evidence of distant disease. CT chest (9/29/23) showed few indeterminate less than 6 mm pulmonary nodules, otherwise, no suspicious pulmonary nodule or thoracic adenopathy. S/p TURBT small with bluelight cystoscopy (8/20/24) with pathology showing non-invasive papillary urothelial carcinoma, low-grade. Patient s/p lung resection (10/28/24) with pathology showing invasive non-small cell carcinoma, all resected and does not need RT or chemotherapy. Doing well. Denies hematuria. Management options including risks, benefits and alternatives discussed at length and all questions answered. Patient prefers to proceed with follow-up in 6mos for cystoscopy with fulguration.           Scribe Attestation  By signing my name below, IMercedes Scribe   attest that this documentation has been prepared under the direction and in the presence of Fazal Mendoza MD.

## 2025-07-10 ENCOUNTER — APPOINTMENT (OUTPATIENT)
Dept: UROLOGY | Facility: CLINIC | Age: 65
End: 2025-07-10
Payer: COMMERCIAL

## 2025-07-10 VITALS
SYSTOLIC BLOOD PRESSURE: 124 MMHG | HEART RATE: 83 BPM | DIASTOLIC BLOOD PRESSURE: 79 MMHG | TEMPERATURE: 97.8 F | HEIGHT: 63 IN | WEIGHT: 179.2 LBS | BODY MASS INDEX: 31.75 KG/M2

## 2025-07-10 DIAGNOSIS — C67.9 MALIGNANT NEOPLASM OF URINARY BLADDER, UNSPECIFIED SITE (MULTI): ICD-10-CM

## 2025-07-10 DIAGNOSIS — Z79.2 NEED FOR PROPHYLACTIC ANTIBIOTIC: Primary | ICD-10-CM

## 2025-07-10 PROCEDURE — 52214 CYSTOSCOPY AND TREATMENT: CPT | Performed by: STUDENT IN AN ORGANIZED HEALTH CARE EDUCATION/TRAINING PROGRAM

## 2025-07-10 PROCEDURE — 88112 CYTOPATH CELL ENHANCE TECH: CPT | Performed by: PATHOLOGY

## 2025-07-10 PROCEDURE — 88112 CYTOPATH CELL ENHANCE TECH: CPT

## 2025-07-10 RX ORDER — CEPHALEXIN 500 MG/1
500 CAPSULE ORAL 2 TIMES DAILY
Qty: 2 CAPSULE | Refills: 0 | Status: SHIPPED | OUTPATIENT
Start: 2025-07-10 | End: 2025-07-11

## 2025-07-10 ASSESSMENT — PAIN SCALES - GENERAL: PAINLEVEL_OUTOF10: 0-NO PAIN

## 2025-07-15 ENCOUNTER — TELEPHONE (OUTPATIENT)
Dept: UROLOGY | Facility: CLINIC | Age: 65
End: 2025-07-15
Payer: COMMERCIAL

## 2025-07-15 DIAGNOSIS — R30.0 DYSURIA: Primary | ICD-10-CM

## 2025-07-15 RX ORDER — CEPHALEXIN 500 MG/1
500 CAPSULE ORAL 2 TIMES DAILY
Qty: 20 CAPSULE | Refills: 0 | Status: SHIPPED | OUTPATIENT
Start: 2025-07-15 | End: 2025-07-25

## 2025-07-15 NOTE — TELEPHONE ENCOUNTER
Vm left returning patient call about UTI symptoms. Informed patient to drop off urine sample at any [facility]. Will call patient with results.

## 2025-07-24 ENCOUNTER — APPOINTMENT (OUTPATIENT)
Dept: UROLOGY | Facility: CLINIC | Age: 65
End: 2025-07-24
Payer: COMMERCIAL

## 2025-08-20 ENCOUNTER — APPOINTMENT (OUTPATIENT)
Dept: PULMONOLOGY | Facility: CLINIC | Age: 65
End: 2025-08-20
Payer: COMMERCIAL

## 2025-08-22 ENCOUNTER — OFFICE VISIT (OUTPATIENT)
Dept: PRIMARY CARE | Facility: CLINIC | Age: 65
End: 2025-08-22
Payer: COMMERCIAL

## 2025-08-22 VITALS
WEIGHT: 176.6 LBS | OXYGEN SATURATION: 99 % | RESPIRATION RATE: 16 BRPM | SYSTOLIC BLOOD PRESSURE: 120 MMHG | HEIGHT: 63 IN | DIASTOLIC BLOOD PRESSURE: 62 MMHG | BODY MASS INDEX: 31.29 KG/M2 | HEART RATE: 92 BPM

## 2025-08-22 DIAGNOSIS — L20.9 ATOPIC DERMATITIS, UNSPECIFIED TYPE: ICD-10-CM

## 2025-08-22 DIAGNOSIS — Z00.00 ENCOUNTER FOR ANNUAL PHYSICAL EXAM: Primary | ICD-10-CM

## 2025-08-22 DIAGNOSIS — I10 ESSENTIAL (PRIMARY) HYPERTENSION: ICD-10-CM

## 2025-08-22 DIAGNOSIS — E55.9 VITAMIN D DEFICIENCY: ICD-10-CM

## 2025-08-22 DIAGNOSIS — J41.0 SIMPLE CHRONIC BRONCHITIS (MULTI): ICD-10-CM

## 2025-08-22 DIAGNOSIS — E78.2 MIXED HYPERLIPIDEMIA: ICD-10-CM

## 2025-08-22 DIAGNOSIS — Z13.29 SCREENING FOR THYROID DISORDER: ICD-10-CM

## 2025-08-22 DIAGNOSIS — E11.9 TYPE 2 DIABETES MELLITUS WITHOUT COMPLICATION, WITHOUT LONG-TERM CURRENT USE OF INSULIN: ICD-10-CM

## 2025-08-22 LAB — POC HEMOGLOBIN A1C: 6.1 % (ref 4.2–6.5)

## 2025-08-22 PROCEDURE — 3078F DIAST BP <80 MM HG: CPT

## 2025-08-22 PROCEDURE — 3074F SYST BP LT 130 MM HG: CPT

## 2025-08-22 PROCEDURE — 3008F BODY MASS INDEX DOCD: CPT

## 2025-08-22 PROCEDURE — RXMED WILLOW AMBULATORY MEDICATION CHARGE

## 2025-08-22 PROCEDURE — 4010F ACE/ARB THERAPY RXD/TAKEN: CPT

## 2025-08-22 PROCEDURE — 99396 PREV VISIT EST AGE 40-64: CPT

## 2025-08-22 PROCEDURE — 83036 HEMOGLOBIN GLYCOSYLATED A1C: CPT

## 2025-08-22 PROCEDURE — 3044F HG A1C LEVEL LT 7.0%: CPT

## 2025-08-22 RX ORDER — FLUTICASONE PROPIONATE AND SALMETEROL 500; 50 UG/1; UG/1
1 POWDER RESPIRATORY (INHALATION) 2 TIMES DAILY
Qty: 180 EACH | Refills: 2 | Status: CANCELLED | OUTPATIENT
Start: 2025-08-22

## 2025-08-22 RX ORDER — ALBUTEROL SULFATE 0.83 MG/ML
2.5 SOLUTION RESPIRATORY (INHALATION) 2 TIMES DAILY PRN
Qty: 75 ML | Refills: 2 | Status: SHIPPED | OUTPATIENT
Start: 2025-08-22

## 2025-08-22 RX ORDER — ROSUVASTATIN CALCIUM 5 MG/1
5 TABLET, COATED ORAL DAILY
Qty: 90 TABLET | Refills: 1 | Status: SHIPPED | OUTPATIENT
Start: 2025-08-22

## 2025-08-22 RX ORDER — FLUTICASONE PROPIONATE AND SALMETEROL 500; 50 UG/1; UG/1
1 POWDER RESPIRATORY (INHALATION) 2 TIMES DAILY
Qty: 180 EACH | Refills: 1 | Status: SHIPPED | OUTPATIENT
Start: 2025-08-22

## 2025-08-22 RX ORDER — ALBUTEROL SULFATE 90 UG/1
2 INHALANT RESPIRATORY (INHALATION) EVERY 4 HOURS PRN
Qty: 6.7 G | Refills: 5 | Status: SHIPPED | OUTPATIENT
Start: 2025-08-22 | End: 2026-08-22

## 2025-08-22 RX ORDER — ALBUTEROL SULFATE 0.83 MG/ML
2.5 SOLUTION RESPIRATORY (INHALATION) 2 TIMES DAILY PRN
COMMUNITY
End: 2025-08-22 | Stop reason: SDUPTHER

## 2025-08-22 RX ORDER — LISINOPRIL 5 MG/1
5 TABLET ORAL DAILY
Qty: 90 TABLET | Refills: 1 | Status: SHIPPED | OUTPATIENT
Start: 2025-08-22

## 2025-08-22 RX ORDER — TRIAMCINOLONE ACETONIDE 1 MG/G
OINTMENT TOPICAL 2 TIMES DAILY PRN
Qty: 60 G | Refills: 0 | Status: SHIPPED | OUTPATIENT
Start: 2025-08-22 | End: 2025-12-20

## 2025-08-22 RX ORDER — MESALAMINE 1.2 G/1
1200 TABLET, DELAYED RELEASE ORAL
COMMUNITY

## 2025-08-22 RX ORDER — METFORMIN HYDROCHLORIDE 500 MG/1
500 TABLET, EXTENDED RELEASE ORAL 2 TIMES DAILY
Qty: 180 TABLET | Refills: 1 | Status: SHIPPED | OUTPATIENT
Start: 2025-08-22

## 2025-08-22 ASSESSMENT — ENCOUNTER SYMPTOMS
DEPRESSION: 0
LOSS OF SENSATION IN FEET: 0
OCCASIONAL FEELINGS OF UNSTEADINESS: 0

## 2025-08-22 ASSESSMENT — PAIN SCALES - GENERAL: PAINLEVEL_OUTOF10: 0-NO PAIN

## 2025-08-25 ENCOUNTER — PHARMACY VISIT (OUTPATIENT)
Dept: PHARMACY | Facility: CLINIC | Age: 65
End: 2025-08-25
Payer: MEDICARE

## 2025-10-14 ENCOUNTER — APPOINTMENT (OUTPATIENT)
Facility: CLINIC | Age: 65
End: 2025-10-14
Payer: COMMERCIAL

## 2025-12-11 ENCOUNTER — APPOINTMENT (OUTPATIENT)
Dept: UROLOGY | Facility: CLINIC | Age: 65
End: 2025-12-11
Payer: COMMERCIAL

## 2025-12-31 ENCOUNTER — APPOINTMENT (OUTPATIENT)
Dept: PULMONOLOGY | Facility: CLINIC | Age: 65
End: 2025-12-31
Payer: COMMERCIAL

## (undated) DEVICE — CATHETER, THORACIC, STRAIGHT, ADULT, 28 FR, PVC

## (undated) DEVICE — SOLUTION, IRRIGATION, SODIUM CHLORIDE 0.9%, 1000 ML, POUR BOTTLE

## (undated) DEVICE — GRASPER, FENESTRATED TIP-UP XI

## (undated) DEVICE — RETRIEVER, ENDOPOUCH, SPEC BAG

## (undated) DEVICE — COLLECTION UNIT, DRAINAGE, THORACIC, SINGLE TUBE, DRY SUCTION, ATS COMPATIBLE, OASIS 3600, LF

## (undated) DEVICE — SUTURE, ETHIBOND, 0, V-34, 30 IN GREEN

## (undated) DEVICE — Device

## (undated) DEVICE — FORCEPS, CADIERE, DAVINCI XI

## (undated) DEVICE — RELOAD, SUREFORM 45, 2.5 WHITE

## (undated) DEVICE — CATHETER, URETHRAL, FOLEY, 2 WAY, BARDEX LUBRICATH, MEDIUM LENGTH, 18 FR, 30 CC, LATEX

## (undated) DEVICE — LOOP, BIPOLAR CUTTING, 24/26 FR, F/URO, 0.35MM, STERILE

## (undated) DEVICE — BLANKET, LOWER BODY, VHA PLUS, ADULT

## (undated) DEVICE — CONTAINER, SPECIMEN, 4 OZ, OR PEEL PACK, STERILE

## (undated) DEVICE — GRASPER, LONG, BIPOLAR, DAVINCI XI

## (undated) DEVICE — STRIP, SKIN CLOSURE, STERI STRIP, REINFORCED, 0.5 X 4 IN

## (undated) DEVICE — DRESSING, TELFA, 3X4

## (undated) DEVICE — SEAL, UNIVERSAL, 5-12MM

## (undated) DEVICE — OBTURATOR, BLADELESS , SU

## (undated) DEVICE — SUTURE, SILK, 2-0, 18 IN, FSL, BLACK

## (undated) DEVICE — LOOP, VESSEL, MAXI, RED

## (undated) DEVICE — TUBING, SUCTION, CONNECTING, STERILE 0.25 X 120 IN., LF

## (undated) DEVICE — CANNULA SEAL, STAPLER, DAVINCI XI

## (undated) DEVICE — STAPLER, SUREFORM 45, DAVINCI XI

## (undated) DEVICE — GLOVE, SURGICAL, PROTEXIS PI , 7.5, PF, LF

## (undated) DEVICE — SPONGE, GAUZE, 12 PLY, 4 X 4, STERILE, DISP

## (undated) DEVICE — RELOAD, SUREFORM 45, 4.6 BLACK

## (undated) DEVICE — GOWN, SURGICAL, SIRUS, NON REINFORCED, LARGE

## (undated) DEVICE — GLOVE, SURGICAL, PROTEXIS MICRO, 7.5, PF, LATEX

## (undated) DEVICE — TUBING SET, TRI-LUMEN, FILTERED, F/AIRSEAL

## (undated) DEVICE — DRESSING, NON-ADHERENT, TELFA, OUCHLESS, 3 X 8 IN, STERILE

## (undated) DEVICE — CANNULA REDUCER, DAVINCI XI

## (undated) DEVICE — DRAPE, ARM XI

## (undated) DEVICE — SEAL, UNIVERSAL 5-8MM  XI

## (undated) DEVICE — IRRIGATION SET, CYSTOSCOPY, F/CONSTANT/INTERMITTENT, 8 GTT/CC, 77 IN

## (undated) DEVICE — IRRIGATOR, WOUND, HYDRO SURG PLUS, W/O TIP, DISP

## (undated) DEVICE — CARE KIT, LAPAROSCOPIC, ADVANCED

## (undated) DEVICE — SUTURE, VICRYL, 4-0, 18 IN, P2, UNDYED

## (undated) DEVICE — DRAPE, INCISE, ANTIMICROBIAL, IOBAN 2, LARGE, 17 X 23 IN, DISPOSABLE, STERILE

## (undated) DEVICE — SUTURE, VICRYL, 2-0, 36 IN, CT-1, UNDYED

## (undated) DEVICE — RETRIEVAL SYSTEM, MONARCH, 10MM DISP ENDOSCOPIC

## (undated) DEVICE — DRESSING, MOISTURE VAPOR PERMEABLE, TEGADERM, 2 3/8 X 2 3/4IN, TRANSPARENT